# Patient Record
Sex: FEMALE | Race: WHITE | Employment: OTHER | URBAN - METROPOLITAN AREA
[De-identification: names, ages, dates, MRNs, and addresses within clinical notes are randomized per-mention and may not be internally consistent; named-entity substitution may affect disease eponyms.]

---

## 2017-01-06 ENCOUNTER — TELEPHONE (OUTPATIENT)
Dept: FAMILY MEDICINE CLINIC | Facility: CLINIC | Age: 82
End: 2017-01-06

## 2017-01-06 ENCOUNTER — TELEPHONE (OUTPATIENT)
Dept: CARDIOLOGY CLINIC | Facility: CLINIC | Age: 82
End: 2017-01-06

## 2017-01-06 DIAGNOSIS — M17.12 OSTEOARTHRITIS OF LEFT KNEE, UNSPECIFIED OSTEOARTHRITIS TYPE: Primary | ICD-10-CM

## 2017-01-06 RX ORDER — FUROSEMIDE 20 MG/1
40 TABLET ORAL DAILY
Qty: 30 TABLET | Refills: 0 | Status: SHIPPED | OUTPATIENT
Start: 2017-01-06 | End: 2017-01-24

## 2017-01-06 NOTE — TELEPHONE ENCOUNTER
Patient states that she needs to get an order to PT on strengthens of the left knee,  Unable to straighten her knee. Physical therapy is not given.

## 2017-01-09 NOTE — TELEPHONE ENCOUNTER
[facility], please advise on order.

## 2017-01-10 ENCOUNTER — OFFICE VISIT (OUTPATIENT)
Dept: FAMILY MEDICINE CLINIC | Facility: CLINIC | Age: 82
End: 2017-01-10

## 2017-01-10 VITALS — DIASTOLIC BLOOD PRESSURE: 79 MMHG | TEMPERATURE: 98 F | HEART RATE: 93 BPM | SYSTOLIC BLOOD PRESSURE: 129 MMHG

## 2017-01-10 DIAGNOSIS — I48.0 PAROXYSMAL ATRIAL FIBRILLATION (HCC): ICD-10-CM

## 2017-01-10 DIAGNOSIS — J44.1 COPD EXACERBATION (HCC): ICD-10-CM

## 2017-01-10 DIAGNOSIS — I42.9 CARDIOMYOPATHY, UNSPECIFIED TYPE (HCC): ICD-10-CM

## 2017-01-10 DIAGNOSIS — M17.12 OSTEOARTHRITIS OF LEFT KNEE, UNSPECIFIED OSTEOARTHRITIS TYPE: ICD-10-CM

## 2017-01-10 DIAGNOSIS — M77.11 LATERAL EPICONDYLITIS OF RIGHT ELBOW: Primary | ICD-10-CM

## 2017-01-10 PROCEDURE — G0463 HOSPITAL OUTPT CLINIC VISIT: HCPCS | Performed by: FAMILY MEDICINE

## 2017-01-10 PROCEDURE — 99214 OFFICE O/P EST MOD 30 MIN: CPT | Performed by: FAMILY MEDICINE

## 2017-01-10 RX ORDER — PREDNISONE 20 MG/1
20 TABLET ORAL 2 TIMES DAILY
Qty: 10 TABLET | Refills: 0 | Status: SHIPPED | OUTPATIENT
Start: 2017-01-10 | End: 2017-01-15

## 2017-01-10 NOTE — TELEPHONE ENCOUNTER
Diagnosis severe osteoarthritis of the left knee.   Physical therapy left knee 2 times a week for 4 weeks eval and treat

## 2017-01-10 NOTE — PROGRESS NOTES
Pt with swelling of her knee. Shattered knee during fall. Has some swelling of leg   Has been without her water pill for a few days. Will start home therapy, but may want to change to Piero Hall who is a physical therapist.    Pain in rt elbow.   Had

## 2017-01-12 ENCOUNTER — TELEPHONE (OUTPATIENT)
Dept: FAMILY MEDICINE CLINIC | Facility: CLINIC | Age: 82
End: 2017-01-12

## 2017-01-12 NOTE — TELEPHONE ENCOUNTER
Pt stts she has the information on physical therapist where her insurance will approve, Pt stts she feels more comfortable giving to the nurse when she calls back.  Please advise

## 2017-01-13 NOTE — TELEPHONE ENCOUNTER
Patient wants to provide an update that we can please disregard her request below. She will be seeing Dr Anthony Geller for this item below.

## 2017-01-19 ENCOUNTER — APPOINTMENT (OUTPATIENT)
Dept: LAB | Age: 82
End: 2017-01-19
Attending: FAMILY MEDICINE
Payer: MEDICARE

## 2017-01-19 PROCEDURE — 36415 COLL VENOUS BLD VENIPUNCTURE: CPT | Performed by: FAMILY MEDICINE

## 2017-01-19 PROCEDURE — 85025 COMPLETE CBC W/AUTO DIFF WBC: CPT | Performed by: FAMILY MEDICINE

## 2017-01-19 PROCEDURE — 80061 LIPID PANEL: CPT | Performed by: FAMILY MEDICINE

## 2017-01-19 PROCEDURE — 80053 COMPREHEN METABOLIC PANEL: CPT | Performed by: FAMILY MEDICINE

## 2017-01-19 RX ORDER — SPIRONOLACTONE 25 MG/1
TABLET ORAL
Qty: 90 TABLET | Refills: 0 | Status: SHIPPED | OUTPATIENT
Start: 2017-01-19 | End: 2017-04-17

## 2017-01-19 NOTE — TELEPHONE ENCOUNTER
Hypertensive Medications  Protocol Criteria:  · Appointment scheduled in the past 6 months or in the next 3 months  · BMP or CMP in the past 12 months  · Creatinine result < 2  Recent Visits       Provider Department Primary Dx    1 week ago Obi Boyer

## 2017-01-22 RX ORDER — ALBUTEROL SULFATE 2.5 MG/3ML
SOLUTION RESPIRATORY (INHALATION)
Qty: 180 ML | Refills: 0 | Status: SHIPPED | OUTPATIENT
Start: 2017-01-22 | End: 2017-03-19

## 2017-01-24 ENCOUNTER — OFFICE VISIT (OUTPATIENT)
Dept: CARDIOLOGY CLINIC | Facility: CLINIC | Age: 82
End: 2017-01-24

## 2017-01-24 VITALS
SYSTOLIC BLOOD PRESSURE: 114 MMHG | WEIGHT: 147 LBS | HEIGHT: 62 IN | BODY MASS INDEX: 27.05 KG/M2 | HEART RATE: 91 BPM | DIASTOLIC BLOOD PRESSURE: 80 MMHG

## 2017-01-24 DIAGNOSIS — R60.9 EDEMA, UNSPECIFIED TYPE: ICD-10-CM

## 2017-01-24 DIAGNOSIS — I48.0 PAROXYSMAL ATRIAL FIBRILLATION (HCC): ICD-10-CM

## 2017-01-24 DIAGNOSIS — I42.9 CARDIOMYOPATHY, UNSPECIFIED TYPE (HCC): Primary | ICD-10-CM

## 2017-01-24 PROCEDURE — 93005 ELECTROCARDIOGRAM TRACING: CPT | Performed by: INTERNAL MEDICINE

## 2017-01-24 PROCEDURE — G0463 HOSPITAL OUTPT CLINIC VISIT: HCPCS | Performed by: INTERNAL MEDICINE

## 2017-01-24 PROCEDURE — 99214 OFFICE O/P EST MOD 30 MIN: CPT | Performed by: INTERNAL MEDICINE

## 2017-01-24 PROCEDURE — 93010 ELECTROCARDIOGRAM REPORT: CPT | Performed by: INTERNAL MEDICINE

## 2017-01-24 RX ORDER — PRAVASTATIN SODIUM 20 MG
20 TABLET ORAL NIGHTLY
Qty: 30 TABLET | Refills: 6 | Status: SHIPPED | OUTPATIENT
Start: 2017-01-24 | End: 2017-08-28

## 2017-01-24 RX ORDER — FUROSEMIDE 20 MG/1
40 TABLET ORAL DAILY
Qty: 60 TABLET | Refills: 6 | Status: SHIPPED | OUTPATIENT
Start: 2017-01-24 | End: 2017-11-06

## 2017-01-24 NOTE — PROGRESS NOTES
Tigist Osullivan is a [de-identified]year old female. Patient presents with: Follow - Up: Yearly follow up    HPI:   This is a pleasant 27-year-old with hyperlipidemia prior A. fib asthma and cardiomyopathy who presents for follow-up.   We have not seen her in some amanda daily Disp:  Rfl:    Vitamin D3 (VITAMIN D3) 2000 UNITS Oral Cap Take  by mouth.  take 1 Capsule by Oral route  every day Disp:  Rfl:    POTASSIUM CHLORIDE ER 10 MEQ Oral Tab CR TAKE 1 TABLET BY MOUTH 4 TIMES A WEEK Disp: 48 tablet Rfl: 0   hydrocodone-acet Cardiomyopathy ([address] - Primary    Relevant Orders    CARD ECHO 2D DOPPLER (CPT=93306)    Atrial fibrillation (HCC)    Relevant Orders    CARD ECHO 2D DOPPLER (CPT=93306)    Edema    Relevant Orders    CARD ECHO 2D DOPPLER (CPT=93306)           In conclu

## 2017-01-26 RX ORDER — QUETIAPINE 100 MG/1
TABLET, FILM COATED ORAL
Qty: 90 TABLET | Refills: 11 | Status: SHIPPED | OUTPATIENT
Start: 2017-01-26 | End: 2019-08-28 | Stop reason: ALTCHOICE

## 2017-02-06 ENCOUNTER — HOSPITAL ENCOUNTER (OUTPATIENT)
Dept: CARDIOLOGY CLINIC | Age: 82
Discharge: HOME | End: 2017-02-06
Attending: INTERNAL MEDICINE
Payer: MEDICARE

## 2017-02-06 DIAGNOSIS — I42.9 CARDIOMYOPATHY, UNSPECIFIED TYPE (HCC): ICD-10-CM

## 2017-02-06 DIAGNOSIS — R60.9 EDEMA, UNSPECIFIED TYPE: ICD-10-CM

## 2017-02-06 DIAGNOSIS — I48.0 PAROXYSMAL ATRIAL FIBRILLATION (HCC): ICD-10-CM

## 2017-02-06 PROCEDURE — 93306 TTE W/DOPPLER COMPLETE: CPT

## 2017-02-06 PROCEDURE — 93306 TTE W/DOPPLER COMPLETE: CPT | Performed by: INTERNAL MEDICINE

## 2017-03-21 RX ORDER — ALBUTEROL SULFATE 2.5 MG/3ML
SOLUTION RESPIRATORY (INHALATION)
Qty: 180 ML | Refills: 0 | Status: SHIPPED | OUTPATIENT
Start: 2017-03-21 | End: 2017-07-27

## 2017-04-18 RX ORDER — SPIRONOLACTONE 25 MG/1
TABLET ORAL
Qty: 90 TABLET | Refills: 0 | Status: SHIPPED | OUTPATIENT
Start: 2017-04-18 | End: 2017-07-20

## 2017-04-24 RX ORDER — ALBUTEROL SULFATE 2.5 MG/3ML
SOLUTION RESPIRATORY (INHALATION)
Qty: 180 ML | Refills: 0 | OUTPATIENT
Start: 2017-04-24

## 2017-04-26 ENCOUNTER — TELEPHONE (OUTPATIENT)
Dept: PULMONOLOGY | Facility: CLINIC | Age: 82
End: 2017-04-26

## 2017-04-26 RX ORDER — ALBUTEROL SULFATE 2.5 MG/3ML
2.5 SOLUTION RESPIRATORY (INHALATION) 4 TIMES DAILY
Qty: 180 ML | Refills: 0 | Status: SHIPPED | OUTPATIENT
Start: 2017-04-26 | End: 2017-06-22

## 2017-04-26 NOTE — TELEPHONE ENCOUNTER
Also see TE from 4/24/2017 - Re: Albuterol Neb Solution - pt aware she needs to come in to see [facility] however she does not have any transportation at the moment. Pls call to discuss. Thank you.

## 2017-04-27 RX ORDER — ALBUTEROL SULFATE 2.5 MG/3ML
SOLUTION RESPIRATORY (INHALATION)
Qty: 1125 ML | Refills: 0 | OUTPATIENT
Start: 2017-04-27

## 2017-05-01 RX ORDER — BUDESONIDE 1 MG/2ML
INHALANT ORAL
Qty: 60 ML | Refills: 11 | Status: SHIPPED | OUTPATIENT
Start: 2017-05-01 | End: 2018-10-26

## 2017-06-08 ENCOUNTER — OFFICE VISIT (OUTPATIENT)
Dept: FAMILY MEDICINE CLINIC | Facility: CLINIC | Age: 82
End: 2017-06-08

## 2017-06-08 VITALS
SYSTOLIC BLOOD PRESSURE: 120 MMHG | WEIGHT: 193 LBS | DIASTOLIC BLOOD PRESSURE: 77 MMHG | HEART RATE: 98 BPM | BODY MASS INDEX: 35 KG/M2

## 2017-06-08 DIAGNOSIS — I42.9 CARDIOMYOPATHY, UNSPECIFIED TYPE (HCC): ICD-10-CM

## 2017-06-08 DIAGNOSIS — J43.9 PULMONARY EMPHYSEMA, UNSPECIFIED EMPHYSEMA TYPE (HCC): ICD-10-CM

## 2017-06-08 DIAGNOSIS — E78.00 HIGH CHOLESTEROL: ICD-10-CM

## 2017-06-08 DIAGNOSIS — M77.11 LATERAL EPICONDYLITIS OF RIGHT ELBOW: Primary | ICD-10-CM

## 2017-06-08 PROCEDURE — G0463 HOSPITAL OUTPT CLINIC VISIT: HCPCS | Performed by: FAMILY MEDICINE

## 2017-06-08 PROCEDURE — 99214 OFFICE O/P EST MOD 30 MIN: CPT | Performed by: FAMILY MEDICINE

## 2017-06-08 RX ORDER — POTASSIUM CHLORIDE 750 MG/1
TABLET, EXTENDED RELEASE ORAL
Qty: 90 TABLET | Refills: 3 | Status: SHIPPED | OUTPATIENT
Start: 2017-06-08 | End: 2018-07-24

## 2017-06-08 NOTE — PROGRESS NOTES
Rt elbow pain  Severe. Has had problems with her right elbow for months. Probably more than 6 months.   She uses a walker that has brakes and she uses the brakes and it is hard to apply the brakes on the walker she also has a problem with front wheels of crowded palate was normal  Lungs  reduced breath sounds bilaterally  Abdomen was soft non tender non distended bowel sounds were present  Heart was regular rate and rhythm normal S1-S2 no S3 no S4 there were no murmurs appreciated  Pulses the dorsalis pedi

## 2017-06-15 RX ORDER — ALBUTEROL SULFATE 2.5 MG/3ML
SOLUTION RESPIRATORY (INHALATION)
Qty: 225 ML | Refills: 0 | OUTPATIENT
Start: 2017-06-15

## 2017-06-15 NOTE — TELEPHONE ENCOUNTER
Pt states she will not be following up with Dr. Laura Mina and would like the pharmacy sent request to her PCP. Remi Ojeda at [facility] notified of this and she will send request to Dr. Christine Lopez.

## 2017-06-21 ENCOUNTER — TELEPHONE (OUTPATIENT)
Dept: PULMONOLOGY | Facility: CLINIC | Age: 82
End: 2017-06-21

## 2017-06-21 NOTE — TELEPHONE ENCOUNTER
Pt called requesting RX: ALBUTEROL, she is anxious and indicates she is unable to see/or make appt with Dr. Mi Mckeon to get RX, pls call at: [phone], thank you.   Current outpatient prescriptions:   ••  ALBUTEROL SULFATE (2.5 MG/3ML) 0.083% Inhalation Neb

## 2017-06-21 NOTE — TELEPHONE ENCOUNTER
Please refer to TE 6/13/17. Patient is refusing to make appt with Dr. Lynn Maire, has not been seen since 2015, pt was instructed to call PCP to fill medication Dr. Fawad Laguerre.  Pt stts she was referred to see Dr. Kenan Anguiano, pt was offered next available appt 7/14 w

## 2017-06-22 ENCOUNTER — TELEPHONE (OUTPATIENT)
Dept: FAMILY MEDICINE CLINIC | Facility: CLINIC | Age: 82
End: 2017-06-22

## 2017-06-22 RX ORDER — ALBUTEROL SULFATE 2.5 MG/3ML
2.5 SOLUTION RESPIRATORY (INHALATION) 4 TIMES DAILY
Qty: 100 AMPULE | Refills: 11 | Status: SHIPPED | OUTPATIENT
Start: 2017-06-22 | End: 2017-10-21

## 2017-06-22 NOTE — TELEPHONE ENCOUNTER
Patient stated that can not get in to see Pulmonary until July 17th. Patient indicated that wheezing but currently using her pulmicort inhaler. Denies being in distress. Chronic cough, but that is nothing new, has COPD.  Patient requesting a refill on the a

## 2017-06-22 NOTE — TELEPHONE ENCOUNTER
[facility] [place]     Current Outpatient Prescriptions:  ALBUTEROL SULFATE (2.5 MG/3ML) 0.083% Inhalation Nebu Soln USE ONE VIAL VIA NEBULIZER FOUR TIMES DAILY AS DIRECTED Disp: 180 mL Rfl: 0

## 2017-06-22 NOTE — TELEPHONE ENCOUNTER
Patient reports needing refill on inhaler. She does not want to go to [facility] to see Dr Danielle Matos as she has concerns with being able to tolerate traveling. She is requesting inhaler for one month to hold her over until she gets in to see Dr Hood Sandoval.     Current Ou

## 2017-06-22 NOTE — TELEPHONE ENCOUNTER
Refill Protocol Appointment Criteria  · Appointment scheduled in the past 6 months or in the next 3 months  Recent Visits       Provider Department Primary Dx    2 weeks ago Aviva Burns, [address] [facility]

## 2017-07-06 ENCOUNTER — LAB ENCOUNTER (OUTPATIENT)
Dept: LAB | Age: 82
End: 2017-07-06
Attending: FAMILY MEDICINE
Payer: MEDICARE

## 2017-07-06 DIAGNOSIS — I42.9 CARDIOMYOPATHY, UNSPECIFIED TYPE (HCC): ICD-10-CM

## 2017-07-06 DIAGNOSIS — E78.00 HIGH CHOLESTEROL: ICD-10-CM

## 2017-07-06 LAB
ALBUMIN SERPL BCP-MCNC: 3.9 G/DL (ref 3.5–4.8)
ALBUMIN/GLOB SERPL: 1.5 {RATIO} (ref 1–2)
ALP SERPL-CCNC: 81 U/L (ref 32–100)
ALT SERPL-CCNC: 18 U/L (ref 14–54)
ANION GAP SERPL CALC-SCNC: 10 MMOL/L (ref 0–18)
AST SERPL-CCNC: 21 U/L (ref 15–41)
BASOPHILS # BLD: 0 K/UL (ref 0–0.2)
BASOPHILS NFR BLD: 1 %
BILIRUB SERPL-MCNC: 0.9 MG/DL (ref 0.3–1.2)
BUN SERPL-MCNC: 23 MG/DL (ref 8–20)
BUN/CREAT SERPL: 19.7 (ref 10–20)
CALCIUM SERPL-MCNC: 10.1 MG/DL (ref 8.5–10.5)
CHLORIDE SERPL-SCNC: 102 MMOL/L (ref 95–110)
CHOLEST SERPL-MCNC: 167 MG/DL (ref 110–200)
CO2 SERPL-SCNC: 25 MMOL/L (ref 22–32)
CREAT SERPL-MCNC: 1.17 MG/DL (ref 0.5–1.5)
EOSINOPHIL # BLD: 0.2 K/UL (ref 0–0.7)
EOSINOPHIL NFR BLD: 4 %
ERYTHROCYTE [DISTWIDTH] IN BLOOD BY AUTOMATED COUNT: 13.6 % (ref 11–15)
GLOBULIN PLAS-MCNC: 2.6 G/DL (ref 2.5–3.7)
GLUCOSE SERPL-MCNC: 100 MG/DL (ref 70–99)
HCT VFR BLD AUTO: 40 % (ref 35–48)
HDLC SERPL-MCNC: 72 MG/DL
HGB BLD-MCNC: 13.3 G/DL (ref 12–16)
LDLC SERPL CALC-MCNC: 76 MG/DL (ref 0–99)
LYMPHOCYTES # BLD: 0.8 K/UL (ref 1–4)
LYMPHOCYTES NFR BLD: 13 %
MCH RBC QN AUTO: 30.4 PG (ref 27–32)
MCHC RBC AUTO-ENTMCNC: 33.2 G/DL (ref 32–37)
MCV RBC AUTO: 91.3 FL (ref 80–100)
MONOCYTES # BLD: 0.3 K/UL (ref 0–1)
MONOCYTES NFR BLD: 6 %
NEUTROPHILS # BLD AUTO: 4.4 K/UL (ref 1.8–7.7)
NEUTROPHILS NFR BLD: 76 %
NONHDLC SERPL-MCNC: 95 MG/DL
OSMOLALITY UR CALC.SUM OF ELEC: 288 MOSM/KG (ref 275–295)
PLATELET # BLD AUTO: 206 K/UL (ref 140–400)
PMV BLD AUTO: 9.1 FL (ref 7.4–10.3)
POTASSIUM SERPL-SCNC: 4.2 MMOL/L (ref 3.3–5.1)
PROT SERPL-MCNC: 6.5 G/DL (ref 5.9–8.4)
RBC # BLD AUTO: 4.38 M/UL (ref 3.7–5.4)
SODIUM SERPL-SCNC: 137 MMOL/L (ref 136–144)
TRIGL SERPL-MCNC: 96 MG/DL (ref 1–149)
WBC # BLD AUTO: 5.8 K/UL (ref 4–11)

## 2017-07-06 PROCEDURE — 80061 LIPID PANEL: CPT

## 2017-07-06 PROCEDURE — 85025 COMPLETE CBC W/AUTO DIFF WBC: CPT

## 2017-07-06 PROCEDURE — 36415 COLL VENOUS BLD VENIPUNCTURE: CPT

## 2017-07-06 PROCEDURE — 80053 COMPREHEN METABOLIC PANEL: CPT

## 2017-07-10 DIAGNOSIS — I42.9 CARDIOMYOPATHY, UNSPECIFIED TYPE (HCC): Primary | ICD-10-CM

## 2017-07-10 NOTE — ADDENDUM NOTE
Encounter addended by: Jacqueline Palma on: 7/10/2017  8:16 AM<BR>    Actions taken: Letter status changed

## 2017-07-14 ENCOUNTER — HOSPITAL ENCOUNTER (OUTPATIENT)
Dept: GENERAL RADIOLOGY | Age: 82
Discharge: HOME | End: 2017-07-14
Attending: INTERNAL MEDICINE | Admitting: INTERNAL MEDICINE
Payer: MEDICARE

## 2017-07-14 ENCOUNTER — OFFICE VISIT (OUTPATIENT)
Dept: PULMONOLOGY | Facility: CLINIC | Age: 82
End: 2017-07-14

## 2017-07-14 VITALS
HEART RATE: 93 BPM | RESPIRATION RATE: 19 BRPM | SYSTOLIC BLOOD PRESSURE: 128 MMHG | OXYGEN SATURATION: 98 % | DIASTOLIC BLOOD PRESSURE: 79 MMHG

## 2017-07-14 DIAGNOSIS — R05.9 COUGH: ICD-10-CM

## 2017-07-14 DIAGNOSIS — R05.9 COUGH: Primary | ICD-10-CM

## 2017-07-14 DIAGNOSIS — G47.33 OSA ON CPAP: ICD-10-CM

## 2017-07-14 DIAGNOSIS — Z99.89 OSA ON CPAP: ICD-10-CM

## 2017-07-14 PROCEDURE — 99214 OFFICE O/P EST MOD 30 MIN: CPT | Performed by: INTERNAL MEDICINE

## 2017-07-14 PROCEDURE — 71020 XR CHEST PA + LAT CHEST (CPT=71020): CPT | Performed by: INTERNAL MEDICINE

## 2017-07-14 PROCEDURE — G0463 HOSPITAL OUTPT CLINIC VISIT: HCPCS | Performed by: INTERNAL MEDICINE

## 2017-07-14 RX ORDER — ALBUTEROL SULFATE 2.5 MG/3ML
2.5 SOLUTION RESPIRATORY (INHALATION) 4 TIMES DAILY
Qty: 120 VIAL | Refills: 5 | Status: SHIPPED | OUTPATIENT
Start: 2017-07-14 | End: 2018-10-26

## 2017-07-14 RX ORDER — CODEINE PHOSPHATE AND GUAIFENESIN 10; 100 MG/5ML; MG/5ML
5 SOLUTION ORAL 2 TIMES DAILY PRN
Qty: 180 ML | Refills: 0 | Status: SHIPPED | OUTPATIENT
Start: 2017-07-14 | End: 2017-07-27

## 2017-07-14 RX ORDER — BUDESONIDE 1 MG/2ML
1 INHALANT ORAL DAILY
Qty: 30 EACH | Refills: 5 | Status: SHIPPED | OUTPATIENT
Start: 2017-07-14 | End: 2017-10-21

## 2017-07-14 RX ORDER — AZITHROMYCIN 250 MG/1
TABLET, FILM COATED ORAL
Qty: 1 PACKAGE | Refills: 0 | Status: SHIPPED | OUTPATIENT
Start: 2017-07-14 | End: 2017-07-27

## 2017-07-14 NOTE — H&P
Referring Physician  Nasir Castro. Audie Castro DO    Chief Complaint  Establishing care with new pulmonologist    History of Present Illness  Patient is a 50-year-old  female who presents the pulmonary clinic.   She has been seen by my partner Dr. Veronica Bonner artery disease       Social History  Tobacco: 20-pack-year history of tobacco abuse. Quit 40 years ago.   Alcohol: Denies significant intake  Illicit Drugs: Denies    Medications    Current Outpatient Prescriptions on File Prior to Visit:  albuterol sulfat Antibiotics           Comment:Other reaction(s): SULFA (SULFONAMIDE ANTIBIOTICS)    Physical Exam  Constitutional: no acute distress, alert, oriented  HEENT: PERRL, EOMI, nares patents, no nasal polyps   Cardio: RRR, S1 S2  Respiratory: Faint rales  GI: ab

## 2017-07-17 ENCOUNTER — TELEPHONE (OUTPATIENT)
Dept: PULMONOLOGY | Facility: CLINIC | Age: 82
End: 2017-07-17

## 2017-07-17 NOTE — TELEPHONE ENCOUNTER
New order sent to [facility], which is contracted with [facility]. Pt notified. Will await contact.

## 2017-07-17 NOTE — TELEPHONE ENCOUNTER
Cally Elias states they cannot provide pt with CPap and supplies at this time as they are not contracted with pt's insurance. For add'l questions pls call. Thank you.

## 2017-07-21 RX ORDER — SPIRONOLACTONE 25 MG/1
TABLET ORAL
Qty: 90 TABLET | Refills: 11 | Status: SHIPPED | OUTPATIENT
Start: 2017-07-21 | End: 2018-08-16

## 2017-07-27 ENCOUNTER — OFFICE VISIT (OUTPATIENT)
Dept: CARDIOLOGY CLINIC | Facility: CLINIC | Age: 82
End: 2017-07-27

## 2017-07-27 VITALS
RESPIRATION RATE: 18 BRPM | DIASTOLIC BLOOD PRESSURE: 80 MMHG | SYSTOLIC BLOOD PRESSURE: 128 MMHG | HEART RATE: 72 BPM | BODY MASS INDEX: 25 KG/M2 | WEIGHT: 135 LBS

## 2017-07-27 DIAGNOSIS — I42.9 CARDIOMYOPATHY, UNSPECIFIED TYPE (HCC): Primary | ICD-10-CM

## 2017-07-27 DIAGNOSIS — R60.9 EDEMA, UNSPECIFIED TYPE: ICD-10-CM

## 2017-07-27 DIAGNOSIS — I48.0 PAROXYSMAL ATRIAL FIBRILLATION (HCC): ICD-10-CM

## 2017-07-27 PROCEDURE — G0463 HOSPITAL OUTPT CLINIC VISIT: HCPCS | Performed by: INTERNAL MEDICINE

## 2017-07-27 PROCEDURE — 99214 OFFICE O/P EST MOD 30 MIN: CPT | Performed by: INTERNAL MEDICINE

## 2017-07-27 NOTE — PATIENT INSTRUCTIONS
Continue same medicines  Okay from a cardiac standpoint for surgery.   If hospitalized overnight should be monitored on remote telemetry

## 2017-07-27 NOTE — PROGRESS NOTES
James Redman is a 81year old female. Patient presents with:  Surgical/procedure Clearance: 8/31/17  left knee replacement [facility] Dr Jessa Perez  Dyspnea: hx COPD, sleep apnea  Edema: lower extremity    HPI:   This is a pleasant 81year-old well-kn 500 MG Oral Tab Take 500 mg by mouth daily. Disp:  Rfl:    Multiple Vitamins-Minerals (CENTRUM SILVER) Oral Tab Take  by mouth. Take one tablet by mouth daily Disp:  Rfl:    Vitamin D3 (VITAMIN D3) 2000 UNITS Oral Cap Take  by mouth.  take 1 Capsule by Oral edema    Assessment   ASSESSMENT AND PLAN:     Problem List Items Addressed This Visit     Cardiomyopathy ([facility] - Primary    Atrial fibrillation ([facility]    Edema      Other Visit Diagnoses    None.          In conclusion this is a pleasant 81year-old with pr

## 2017-07-28 NOTE — TELEPHONE ENCOUNTER
Spoke to Chaz Chaudhari who sttd they never received order for cpap supplies.  Orders refaxed to [phone]

## 2017-08-01 ENCOUNTER — TELEPHONE (OUTPATIENT)
Dept: PULMONOLOGY | Facility: CLINIC | Age: 82
End: 2017-08-01

## 2017-08-01 ENCOUNTER — TELEPHONE (OUTPATIENT)
Dept: CARDIOLOGY CLINIC | Facility: CLINIC | Age: 82
End: 2017-08-01

## 2017-08-01 DIAGNOSIS — G47.33 OSA (OBSTRUCTIVE SLEEP APNEA): Primary | ICD-10-CM

## 2017-08-01 NOTE — TELEPHONE ENCOUNTER
James Espinosa requesting RN to fax Sleep Study and Rx for new Cpap and supplies to [phone] attn: James Espinosa. For add'l questions pls call. Thank you.

## 2017-08-01 NOTE — TELEPHONE ENCOUNTER
Provider from [facility] calling to requesting cardiac clearance from the last office visit notes. [facility] Fax [phone].  Any questions call: Sen Gómez DK:[phone]

## 2017-08-03 NOTE — TELEPHONE ENCOUNTER
Pt calling indicates she will be at the dentist after noon today (avail till 11:30) Pt getting upset and indicates she needs her cpap materials. Pls call at: [phone] (uofl).

## 2017-08-03 NOTE — TELEPHONE ENCOUNTER
Pt retd call. Please call pt on [phone] only. Pt is very anxious to speak to RN. Attempted to transfer/no answer. Please call.

## 2017-08-04 ENCOUNTER — TELEPHONE (OUTPATIENT)
Dept: SLEEP CENTER | Age: 82
End: 2017-08-04

## 2017-08-04 NOTE — TELEPHONE ENCOUNTER
Eduarda Us manager of sleep study indicates that pt is reaching out to her and she can not help her due to sleep study over 10ys, unless we have our own? Eduarda Us is requesting we call the pt directly at   [phone],[facility].

## 2017-08-04 NOTE — TELEPHONE ENCOUNTER
Notified pt of below. She stts serial # [id number]. Explained I will d/w [facility] & f/u. She verbalized understanding.     Dave Jaramillo @ [employer] stts they have been trying to p/u machine since April, it is their machine, the facility is being charged f

## 2017-08-04 NOTE — TELEPHONE ENCOUNTER
Pt called [facility] asking for direction with getting new equipment. Referred pt back to MD Jose F Alvarado office and spoke to KALEN MCARTHUR

## 2017-08-04 NOTE — TELEPHONE ENCOUNTER
Mira complains that Bouchra Donahue never called her back on her cell phone. Pt voices frustration (raising voice, crying). She stts she & her family will come to the clinic to file a complaint next week.  Explained Bouchra Donahue documented messages left, she is not

## 2017-08-04 NOTE — TELEPHONE ENCOUNTER
Informed pt of below. She stts [facility] took her personal machine and gave her the machine she has. Explained she will have to d/w [facility], be seen by Dr. Yudith Edmondson again for face-to-face office visit & have another sleep study, but first avail 8/25.  She st

## 2017-08-08 NOTE — TELEPHONE ENCOUNTER
You may have the patient come see me in the clinic sometime next week either Tuesday or Thursday it is okay to double book. I would prefer she sees me during the first half of the schedule during those 2 days. I have ordered sleep study at the sleep lab.

## 2017-08-08 NOTE — TELEPHONE ENCOUNTER
Attempted to inform pt of Dr. Selwyn Pete orders, but pt interrupted RN.  She stts we still are unable to contact her at the correct phone #, she is tired of waiting for Dr. Hood Sandoval, & she will pursue treatment through a Pulmonologist @ [facility] unless Dr. Hood Sandoval

## 2017-08-10 PROBLEM — G47.33 OBSTRUCTIVE SLEEP APNEA (ADULT) (PEDIATRIC): Status: ACTIVE | Noted: 2017-08-10

## 2017-08-10 PROBLEM — R53.83 FATIGUE, UNSPECIFIED TYPE: Status: ACTIVE | Noted: 2017-08-10

## 2017-08-28 ENCOUNTER — TELEPHONE (OUTPATIENT)
Dept: CARDIOLOGY CLINIC | Facility: CLINIC | Age: 82
End: 2017-08-28

## 2017-08-28 RX ORDER — PRAVASTATIN SODIUM 20 MG
20 TABLET ORAL NIGHTLY
Qty: 30 TABLET | Refills: 6 | Status: SHIPPED | OUTPATIENT
Start: 2017-08-28 | End: 2018-01-23

## 2017-08-28 NOTE — TELEPHONE ENCOUNTER
Pravastatin 20mg tabs, take 1 tab by mouth nightly, qty 30    Current Outpatient Prescriptions:   •  Pravastatin Sodium 20 MG Oral Tab, Take 1 tablet (20 mg total) by mouth nightly., Disp: 30 tablet, Rfl: 6

## 2017-10-02 ENCOUNTER — TELEPHONE (OUTPATIENT)
Dept: FAMILY MEDICINE CLINIC | Facility: CLINIC | Age: 82
End: 2017-10-02

## 2017-10-02 NOTE — TELEPHONE ENCOUNTER
Christelle Bell from [facility] called in as an FYI that there was a delay in start of care until tomorrow. Christelle Bell also states that she will fax over an order to the MD to the [place] office.

## 2017-10-04 NOTE — TELEPHONE ENCOUNTER
Satya Bower from [facility] stts pt started care today, will fax plan over care. Need order to check pts o2 at every visit.      CB# [phone]

## 2017-10-06 NOTE — TELEPHONE ENCOUNTER
OT was unable to schedule for this week, rescheduling for next week per Jose Watson, PT supervisor for [facility]. She will refax orders to Dr. Christian Barr to have signed.

## 2017-10-06 NOTE — TELEPHONE ENCOUNTER
Received call from Satya Bower, [address] [place] [address]. She was verifying the pt's chronic illnesses. She has no further questions or concerns at this time.

## 2017-10-07 ENCOUNTER — TELEPHONE (OUTPATIENT)
Dept: FAMILY MEDICINE CLINIC | Facility: CLINIC | Age: 82
End: 2017-10-07

## 2017-10-07 NOTE — TELEPHONE ENCOUNTER
Home health orders received in [facility]. Placed in [facility] for review and signature.

## 2017-10-09 NOTE — TELEPHONE ENCOUNTER
[facility] see message below [place] orders.

## 2017-10-12 ENCOUNTER — MED REC SCAN ONLY (OUTPATIENT)
Dept: FAMILY MEDICINE CLINIC | Facility: CLINIC | Age: 82
End: 2017-10-12

## 2017-10-13 NOTE — TELEPHONE ENCOUNTER
Evonne Jones is calling from [facility]  state that they will be discharging pt from OT on 10/15 state pt will start out pt OT on 10/22  Requesting a call back to [phone]

## 2017-10-13 NOTE — TELEPHONE ENCOUNTER
Justin Hernandez was inform of Dr.CMc message below and inform her ok to discharge pt from OT the week of 10/15.  Pt will start OUT Pt PT the week of 10/22 but this was order by another THanks

## 2017-10-16 ENCOUNTER — TELEPHONE (OUTPATIENT)
Dept: FAMILY MEDICINE CLINIC | Facility: CLINIC | Age: 82
End: 2017-10-16

## 2017-10-16 NOTE — TELEPHONE ENCOUNTER
[facility], this is to inform you that patient is being discharged [facility] tomorrow and [facility] Thursday 10/19/17 and she will begin outpatient therapy next week

## 2017-10-21 ENCOUNTER — OFFICE VISIT (OUTPATIENT)
Dept: FAMILY MEDICINE CLINIC | Facility: CLINIC | Age: 82
End: 2017-10-21

## 2017-10-21 VITALS
DIASTOLIC BLOOD PRESSURE: 71 MMHG | SYSTOLIC BLOOD PRESSURE: 138 MMHG | BODY MASS INDEX: 28 KG/M2 | HEART RATE: 66 BPM | WEIGHT: 140 LBS

## 2017-10-21 DIAGNOSIS — S72.8X2S: Primary | ICD-10-CM

## 2017-10-21 DIAGNOSIS — M77.11 LATERAL EPICONDYLITIS OF RIGHT ELBOW: ICD-10-CM

## 2017-10-21 DIAGNOSIS — I48.0 PAROXYSMAL ATRIAL FIBRILLATION (HCC): ICD-10-CM

## 2017-10-21 DIAGNOSIS — J44.1 COPD EXACERBATION (HCC): ICD-10-CM

## 2017-10-21 DIAGNOSIS — M81.0 MENOPAUSAL OSTEOPOROSIS: ICD-10-CM

## 2017-10-21 DIAGNOSIS — I42.9 CARDIOMYOPATHY, UNSPECIFIED TYPE (HCC): ICD-10-CM

## 2017-10-21 DIAGNOSIS — J43.9 PULMONARY EMPHYSEMA, UNSPECIFIED EMPHYSEMA TYPE (HCC): ICD-10-CM

## 2017-10-21 PROCEDURE — 99215 OFFICE O/P EST HI 40 MIN: CPT | Performed by: FAMILY MEDICINE

## 2017-10-21 PROCEDURE — G0463 HOSPITAL OUTPT CLINIC VISIT: HCPCS | Performed by: FAMILY MEDICINE

## 2017-10-21 RX ORDER — CHOLECALCIFEROL (VITAMIN D3) 125 MCG
2 CAPSULE ORAL NIGHTLY
Status: ON HOLD | COMMUNITY
End: 2021-01-01

## 2017-10-21 RX ORDER — DOCUSATE SODIUM 100 MG/1
100 CAPSULE, LIQUID FILLED ORAL 2 TIMES DAILY
COMMUNITY
End: 2019-03-07 | Stop reason: ALTCHOICE

## 2017-10-21 NOTE — PROGRESS NOTES
8/31 had malunion fracture left femur orif Dr. Delma Capellan at [address] [place] [address] for [facility]    Now living alone in independent living. Not driving yet    Doing her therapy going to start out patient.         Patient's past medical surgical family social history wa femur, unspecified portion of femur, sequela  Doing well    2. Pulmonary emphysema, unspecified emphysema type ([address]  Stable. 3. Cardiomyopathy, unspecified type ([address]  stable  rehcekc ekg and f/u in Jan.  4. COPD exacerbation ([address]  Stable.   Doing well

## 2017-10-30 RX ORDER — OMEPRAZOLE 40 MG/1
CAPSULE, DELAYED RELEASE ORAL
Qty: 90 CAPSULE | Refills: 0 | Status: SHIPPED | OUTPATIENT
Start: 2017-10-30 | End: 2018-01-29

## 2017-10-30 NOTE — TELEPHONE ENCOUNTER
Refilled per protocol.     Refill Protocol Appointment Criteria  · Appointment scheduled in the past 12 months or in the next 3 months  Recent Outpatient Visits            1 week ago Other type I or II open fracture of left femur, unspecified portion of fem

## 2017-11-06 ENCOUNTER — TELEPHONE (OUTPATIENT)
Dept: CARDIOLOGY CLINIC | Facility: CLINIC | Age: 82
End: 2017-11-06

## 2017-11-06 RX ORDER — FUROSEMIDE 20 MG/1
40 TABLET ORAL DAILY
Qty: 60 TABLET | Refills: 5 | Status: SHIPPED | OUTPATIENT
Start: 2017-11-06 | End: 2018-08-23

## 2017-11-06 NOTE — TELEPHONE ENCOUNTER
Furosemide 20mg tabs (Lasix 20mg tabs), take 2 tabs by mouth daily, qty 60    Current Outpatient Prescriptions:   •  furosemide 20 MG Oral Tab, Take 2 tablets (40 mg total) by mouth daily. , Disp: 60 tablet, Rfl: 6

## 2017-11-29 ENCOUNTER — TELEPHONE (OUTPATIENT)
Dept: PULMONOLOGY | Facility: CLINIC | Age: 82
End: 2017-11-29

## 2017-11-29 NOTE — TELEPHONE ENCOUNTER
Ovi requesting prior auth to be completed today as Medicare will need info prior end of business day. For add'l questions pls call. Thank you.

## 2017-11-29 NOTE — TELEPHONE ENCOUNTER
Francia Patel Medicare is requiring CMN for budesonide neb soln. She stts CMN was faxed, but will be refaxed. Confirmed fax #. Suszanne Crigler stts pt out of med. Explained Dr. Severo Delgadillo will be avail tomorrow & will address then asap. She verbalized understanding.

## 2017-12-01 NOTE — TELEPHONE ENCOUNTER
JAMES faxed to [facility] at #[phone]. Confirmation rcvd.

## 2017-12-20 ENCOUNTER — NURSE TRIAGE (OUTPATIENT)
Dept: OTHER | Age: 82
End: 2017-12-20

## 2017-12-20 NOTE — TELEPHONE ENCOUNTER
Action Requested: Summary for Provider     []  Critical Lab, Recommendations Needed  [x] Need Additional Advice  []   FYI    []   Need Orders  [] Need Medications Sent to Pharmacy  []  Other     SUMMARY: pt asking for something for a runny nose, states she

## 2018-01-10 ENCOUNTER — HOSPITAL ENCOUNTER (OUTPATIENT)
Dept: BONE DENSITY | Facility: HOSPITAL | Age: 83
Discharge: HOME | End: 2018-01-10
Attending: FAMILY MEDICINE
Payer: MEDICARE

## 2018-01-10 ENCOUNTER — APPOINTMENT (OUTPATIENT)
Dept: LAB | Facility: HOSPITAL | Age: 83
End: 2018-01-10
Attending: FAMILY MEDICINE
Payer: MEDICARE

## 2018-01-10 ENCOUNTER — HOSPITAL ENCOUNTER (OUTPATIENT)
Dept: GENERAL RADIOLOGY | Facility: HOSPITAL | Age: 83
Discharge: HOME | End: 2018-01-10
Attending: FAMILY MEDICINE
Payer: MEDICARE

## 2018-01-10 DIAGNOSIS — I42.9 CARDIOMYOPATHY, UNSPECIFIED TYPE (HCC): ICD-10-CM

## 2018-01-10 DIAGNOSIS — M81.0 MENOPAUSAL OSTEOPOROSIS: ICD-10-CM

## 2018-01-10 DIAGNOSIS — I48.0 PAROXYSMAL ATRIAL FIBRILLATION (HCC): Primary | ICD-10-CM

## 2018-01-10 DIAGNOSIS — J43.9 PULMONARY EMPHYSEMA, UNSPECIFIED EMPHYSEMA TYPE (HCC): ICD-10-CM

## 2018-01-10 DIAGNOSIS — I48.0 PAROXYSMAL ATRIAL FIBRILLATION (HCC): ICD-10-CM

## 2018-01-10 LAB
ALBUMIN SERPL BCP-MCNC: 3.7 G/DL (ref 3.5–4.8)
ALBUMIN/GLOB SERPL: 1.4 {RATIO} (ref 1–2)
ALP SERPL-CCNC: 103 U/L (ref 32–100)
ALT SERPL-CCNC: 17 U/L (ref 14–54)
ANION GAP SERPL CALC-SCNC: 7 MMOL/L (ref 0–18)
AST SERPL-CCNC: 21 U/L (ref 15–41)
BASOPHILS # BLD: 0 K/UL (ref 0–0.2)
BASOPHILS NFR BLD: 1 %
BILIRUB SERPL-MCNC: 0.5 MG/DL (ref 0.3–1.2)
BUN SERPL-MCNC: 29 MG/DL (ref 8–20)
BUN/CREAT SERPL: 26.4 (ref 10–20)
CALCIUM SERPL-MCNC: 9.1 MG/DL (ref 8.5–10.5)
CHLORIDE SERPL-SCNC: 105 MMOL/L (ref 95–110)
CO2 SERPL-SCNC: 28 MMOL/L (ref 22–32)
CREAT SERPL-MCNC: 1.1 MG/DL (ref 0.5–1.5)
EOSINOPHIL # BLD: 0.3 K/UL (ref 0–0.7)
EOSINOPHIL NFR BLD: 5 %
ERYTHROCYTE [DISTWIDTH] IN BLOOD BY AUTOMATED COUNT: 16.3 % (ref 11–15)
GLOBULIN PLAS-MCNC: 2.7 G/DL (ref 2.5–3.7)
GLUCOSE SERPL-MCNC: 107 MG/DL (ref 70–99)
HCT VFR BLD AUTO: 36.2 % (ref 35–48)
HGB BLD-MCNC: 11.8 G/DL (ref 12–16)
LYMPHOCYTES # BLD: 0.8 K/UL (ref 1–4)
LYMPHOCYTES NFR BLD: 15 %
MCH RBC QN AUTO: 27.5 PG (ref 27–32)
MCHC RBC AUTO-ENTMCNC: 32.4 G/DL (ref 32–37)
MCV RBC AUTO: 84.9 FL (ref 80–100)
MONOCYTES # BLD: 0.4 K/UL (ref 0–1)
MONOCYTES NFR BLD: 9 %
NEUTROPHILS # BLD AUTO: 3.6 K/UL (ref 1.8–7.7)
NEUTROPHILS NFR BLD: 70 %
OSMOLALITY UR CALC.SUM OF ELEC: 296 MOSM/KG (ref 275–295)
PLATELET # BLD AUTO: 224 K/UL (ref 140–400)
PMV BLD AUTO: 8.3 FL (ref 7.4–10.3)
POTASSIUM SERPL-SCNC: 3.8 MMOL/L (ref 3.3–5.1)
PROT SERPL-MCNC: 6.4 G/DL (ref 5.9–8.4)
RBC # BLD AUTO: 4.27 M/UL (ref 3.7–5.4)
SODIUM SERPL-SCNC: 140 MMOL/L (ref 136–144)
WBC # BLD AUTO: 5.1 K/UL (ref 4–11)

## 2018-01-10 PROCEDURE — 93005 ELECTROCARDIOGRAM TRACING: CPT

## 2018-01-10 PROCEDURE — 85025 COMPLETE CBC W/AUTO DIFF WBC: CPT

## 2018-01-10 PROCEDURE — 93010 ELECTROCARDIOGRAM REPORT: CPT | Performed by: FAMILY MEDICINE

## 2018-01-10 PROCEDURE — 77080 DXA BONE DENSITY AXIAL: CPT | Performed by: FAMILY MEDICINE

## 2018-01-10 PROCEDURE — 71046 X-RAY EXAM CHEST 2 VIEWS: CPT | Performed by: FAMILY MEDICINE

## 2018-01-10 PROCEDURE — 82306 VITAMIN D 25 HYDROXY: CPT

## 2018-01-10 PROCEDURE — 80053 COMPREHEN METABOLIC PANEL: CPT

## 2018-01-10 PROCEDURE — 36415 COLL VENOUS BLD VENIPUNCTURE: CPT

## 2018-01-12 LAB — 25(OH)D3 SERPL-MCNC: 54.4 NG/ML

## 2018-01-13 ENCOUNTER — TELEPHONE (OUTPATIENT)
Dept: OTHER | Age: 83
End: 2018-01-13

## 2018-01-13 DIAGNOSIS — R93.89 ABNORMAL CHEST X-RAY: Primary | ICD-10-CM

## 2018-01-13 NOTE — TELEPHONE ENCOUNTER
----- Message from Bebe Stephens DO sent at 1/12/2018  4:48 PM CST -----  There are changes within the right lower lobe of the lung. This could be due to her COPD versus something else.   Have her get CT chest no contrast diagnosis abnormal chest x-ra

## 2018-01-13 NOTE — TELEPHONE ENCOUNTER
Pt was inform of  message below and she verbalized understanding. She has a appt to see Iraj Mcclure on 1/26.

## 2018-01-13 NOTE — TELEPHONE ENCOUNTER
----- Message from Walter Qureshi DO sent at 1/12/2018  4:46 PM CST -----  Blood sugars slightly elevated. liver test was normal.  Kidney test has been stable/improving from previous studies.   CBC showed slight anemia otherwise normal .  Continue on t

## 2018-01-13 NOTE — TELEPHONE ENCOUNTER
----- Message from Chelsie Gramajo DO sent at 1/12/2018  4:49 PM CST -----  Worsening of the osteoporosis. Follow-up after her CT scan of the chest to discuss.

## 2018-01-19 ENCOUNTER — HOSPITAL ENCOUNTER (OUTPATIENT)
Dept: CT IMAGING | Facility: HOSPITAL | Age: 83
Discharge: HOME | End: 2018-01-19
Attending: FAMILY MEDICINE
Payer: MEDICARE

## 2018-01-19 DIAGNOSIS — R93.89 ABNORMAL CHEST X-RAY: ICD-10-CM

## 2018-01-19 PROCEDURE — 71250 CT THORAX DX C-: CPT | Performed by: FAMILY MEDICINE

## 2018-01-23 ENCOUNTER — OFFICE VISIT (OUTPATIENT)
Dept: CARDIOLOGY CLINIC | Facility: CLINIC | Age: 83
End: 2018-01-23

## 2018-01-23 VITALS
RESPIRATION RATE: 16 BRPM | BODY MASS INDEX: 27 KG/M2 | SYSTOLIC BLOOD PRESSURE: 112 MMHG | DIASTOLIC BLOOD PRESSURE: 80 MMHG | HEART RATE: 64 BPM | WEIGHT: 140 LBS

## 2018-01-23 DIAGNOSIS — I48.0 PAROXYSMAL ATRIAL FIBRILLATION (HCC): ICD-10-CM

## 2018-01-23 DIAGNOSIS — R60.9 EDEMA, UNSPECIFIED TYPE: ICD-10-CM

## 2018-01-23 DIAGNOSIS — I42.9 CARDIOMYOPATHY, UNSPECIFIED TYPE (HCC): Primary | ICD-10-CM

## 2018-01-23 PROCEDURE — G0463 HOSPITAL OUTPT CLINIC VISIT: HCPCS | Performed by: INTERNAL MEDICINE

## 2018-01-23 PROCEDURE — 99214 OFFICE O/P EST MOD 30 MIN: CPT | Performed by: INTERNAL MEDICINE

## 2018-01-23 RX ORDER — PRAVASTATIN SODIUM 20 MG
20 TABLET ORAL NIGHTLY
Qty: 30 TABLET | Refills: 6 | Status: SHIPPED | OUTPATIENT
Start: 2018-01-23 | End: 2018-11-02

## 2018-01-23 NOTE — PROGRESS NOTES
Oneida Inman is a 81year old female. Patient presents with: Follow - Up    HPI:   This is a pleasant 81year-old with history of prior A. fib asthma cardiomyopathy and elevated cholesterol who presents for follow-up.   Since seen she has had knee surge Vitamins-Minerals (CENTRUM SILVER) Oral Tab Take  by mouth. Take one tablet by mouth daily Disp:  Rfl:    Vitamin D3 (VITAMIN D3) 2000 UNITS Oral Cap Take  by mouth.  take 1 Capsule by Oral route  every day Disp:  Rfl:       Past Medical History:   Amber Geiger Visit     Cardiomyopathy [facility]) - Primary    Atrial fibrillation ([address]    Edema          In conclusion this is a pleasant 81year-old with prior atrial fibrillation who is maintaining sinus rhythm and known cardiomyopathy who his LV is improved and is doing

## 2018-01-26 ENCOUNTER — OFFICE VISIT (OUTPATIENT)
Dept: FAMILY MEDICINE CLINIC | Facility: CLINIC | Age: 83
End: 2018-01-26

## 2018-01-26 VITALS
TEMPERATURE: 98 F | DIASTOLIC BLOOD PRESSURE: 75 MMHG | HEART RATE: 69 BPM | WEIGHT: 146 LBS | BODY MASS INDEX: 29 KG/M2 | SYSTOLIC BLOOD PRESSURE: 126 MMHG

## 2018-01-26 DIAGNOSIS — E25.8: ICD-10-CM

## 2018-01-26 DIAGNOSIS — M77.11 LATERAL EPICONDYLITIS OF RIGHT ELBOW: ICD-10-CM

## 2018-01-26 DIAGNOSIS — D35.00 ADRENAL CORTICAL ADENOMA, UNSPECIFIED LATERALITY: ICD-10-CM

## 2018-01-26 DIAGNOSIS — E78.00 HIGH CHOLESTEROL: ICD-10-CM

## 2018-01-26 DIAGNOSIS — I42.9 CARDIOMYOPATHY, UNSPECIFIED TYPE (HCC): ICD-10-CM

## 2018-01-26 DIAGNOSIS — N18.30 STAGE 3 CHRONIC KIDNEY DISEASE (HCC): ICD-10-CM

## 2018-01-26 DIAGNOSIS — J43.9 PULMONARY EMPHYSEMA, UNSPECIFIED EMPHYSEMA TYPE (HCC): Primary | ICD-10-CM

## 2018-01-26 PROCEDURE — 99214 OFFICE O/P EST MOD 30 MIN: CPT | Performed by: FAMILY MEDICINE

## 2018-01-26 PROCEDURE — G0463 HOSPITAL OUTPT CLINIC VISIT: HCPCS | Performed by: FAMILY MEDICINE

## 2018-01-26 RX ORDER — SERTRALINE HYDROCHLORIDE 100 MG/1
TABLET, FILM COATED ORAL
Refills: 0 | COMMUNITY
Start: 2018-01-22 | End: 2019-08-28 | Stop reason: ALTCHOICE

## 2018-01-26 NOTE — PROGRESS NOTES
Pt with cough  Phlegm production  Has adrenal tumors seen on ct        Patient's past medical surgical family social history was reviewed.      Review of Systems  Allergic: no environmental allergies or food allergies  Cardiovascular: no chest pain, irregul laterality  U/s and labs. - CORTISOL; Future  - ASSAY, THYROID STIM HORMONE; Future  - ALDOSTERONE, SERUM; Future  - US KIDNEYS (EYH=52995); Future    6. Other adrenogenital disorders ([address]   Us and labs. - ASSAY, THYROID STIM HORMONE; Future    7.  Stage

## 2018-01-29 RX ORDER — OMEPRAZOLE 40 MG/1
CAPSULE, DELAYED RELEASE ORAL
Qty: 90 CAPSULE | Refills: 0 | Status: SHIPPED | OUTPATIENT
Start: 2018-01-29 | End: 2018-04-27

## 2018-01-29 NOTE — TELEPHONE ENCOUNTER
Refilled per written protocol.     Refill Protocol Appointment Criteria Gastrointestional Medication:    · Appointment scheduled in the past 12 months or in the next 3 months  Recent Outpatient Visits            3 days ago Pulmonary emphysema, unspecified e

## 2018-01-29 NOTE — TELEPHONE ENCOUNTER
Pharmacy called up to refill Rx Omeprazole 40 MG .please advise         Current Outpatient Prescriptions:  MEPRAZOLE 40 MG Oral Capsule Delayed Release TAKE ONE CAPSULE BY MOUTH EVERY NIGHT AT BEDTIME Disp: 90 capsule Rfl: 0

## 2018-01-30 ENCOUNTER — TELEPHONE (OUTPATIENT)
Dept: FAMILY MEDICINE CLINIC | Facility: CLINIC | Age: 83
End: 2018-01-30

## 2018-01-30 DIAGNOSIS — D35.00 ADRENAL CORTICAL ADENOMA, UNSPECIFIED LATERALITY: Primary | ICD-10-CM

## 2018-01-30 NOTE — TELEPHONE ENCOUNTER
Juve Martins is calling from the [id number] [facility] state that she have a question about pt US order requesting a call back

## 2018-01-30 NOTE — TELEPHONE ENCOUNTER
Deedee Mares for ultrasound dept states the kidney ultrasound will not show the adrenal glands. Patient will need to have an MRI. Please advise.

## 2018-02-01 NOTE — TELEPHONE ENCOUNTER
[facility], please advise on any blood test to be done prior to MRI

## 2018-02-01 NOTE — TELEPHONE ENCOUNTER
Pt called back and was inform of Jose Score message below. Pt wants to know if she needs blood work done?

## 2018-02-01 NOTE — TELEPHONE ENCOUNTER
LMTCB-ext 19663-Qabyq generated for MRI. Derrick Arias from Ultrasound informed of US order being cancelled.

## 2018-02-15 ENCOUNTER — APPOINTMENT (OUTPATIENT)
Dept: LAB | Age: 83
End: 2018-02-15
Attending: FAMILY MEDICINE
Payer: MEDICARE

## 2018-02-15 ENCOUNTER — HOSPITAL ENCOUNTER (OUTPATIENT)
Dept: MRI IMAGING | Age: 83
Discharge: HOME | End: 2018-02-15
Attending: FAMILY MEDICINE
Payer: MEDICARE

## 2018-02-15 DIAGNOSIS — D35.00 ADRENAL CORTICAL ADENOMA, UNSPECIFIED LATERALITY: ICD-10-CM

## 2018-02-15 DIAGNOSIS — E25.8: ICD-10-CM

## 2018-02-15 LAB
CORTIS SERPL-MCNC: 8 MCG/DL
TSH SERPL-ACNC: 4.16 UIU/ML (ref 0.45–5.33)

## 2018-02-15 PROCEDURE — 82088 ASSAY OF ALDOSTERONE: CPT

## 2018-02-15 PROCEDURE — 36415 COLL VENOUS BLD VENIPUNCTURE: CPT

## 2018-02-15 PROCEDURE — A9575 INJ GADOTERATE MEGLUMI 0.1ML: HCPCS | Performed by: FAMILY MEDICINE

## 2018-02-15 PROCEDURE — 84443 ASSAY THYROID STIM HORMONE: CPT

## 2018-02-15 PROCEDURE — 82533 TOTAL CORTISOL: CPT

## 2018-02-15 PROCEDURE — 74183 MRI ABD W/O CNTR FLWD CNTR: CPT | Performed by: FAMILY MEDICINE

## 2018-02-17 LAB — ALDOSTERONE: 25.5 NG/DL

## 2018-02-22 ENCOUNTER — TELEPHONE (OUTPATIENT)
Dept: OTHER | Age: 83
End: 2018-02-22

## 2018-02-22 NOTE — TELEPHONE ENCOUNTER
----- Message from Marilou Hobson DO sent at 2/20/2018  7:50 PM CST -----  No significant cause of concern for the renal mass is seen on the CT.   They said that they were adrenal adenomas that are benign and we do not have to do any follow-up regarding

## 2018-02-27 RX ORDER — BUDESONIDE 1 MG/2ML
INHALANT ORAL
Qty: 120 ML | Refills: 1 | Status: SHIPPED | OUTPATIENT
Start: 2018-02-27 | End: 2018-04-27

## 2018-04-27 ENCOUNTER — OFFICE VISIT (OUTPATIENT)
Dept: FAMILY MEDICINE CLINIC | Facility: CLINIC | Age: 83
End: 2018-04-27

## 2018-04-27 VITALS
TEMPERATURE: 98 F | DIASTOLIC BLOOD PRESSURE: 75 MMHG | WEIGHT: 145 LBS | HEART RATE: 76 BPM | SYSTOLIC BLOOD PRESSURE: 129 MMHG | BODY MASS INDEX: 28 KG/M2

## 2018-04-27 DIAGNOSIS — E78.00 HIGH CHOLESTEROL: ICD-10-CM

## 2018-04-27 DIAGNOSIS — F99 INSOMNIA DUE TO OTHER MENTAL DISORDER: ICD-10-CM

## 2018-04-27 DIAGNOSIS — K29.50 CHRONIC GASTRITIS WITHOUT BLEEDING, UNSPECIFIED GASTRITIS TYPE: ICD-10-CM

## 2018-04-27 DIAGNOSIS — F51.05 INSOMNIA DUE TO OTHER MENTAL DISORDER: ICD-10-CM

## 2018-04-27 DIAGNOSIS — E25.8: ICD-10-CM

## 2018-04-27 DIAGNOSIS — F33.1 MODERATE EPISODE OF RECURRENT MAJOR DEPRESSIVE DISORDER (HCC): ICD-10-CM

## 2018-04-27 DIAGNOSIS — I48.0 PAROXYSMAL ATRIAL FIBRILLATION (HCC): ICD-10-CM

## 2018-04-27 DIAGNOSIS — J43.9 PULMONARY EMPHYSEMA, UNSPECIFIED EMPHYSEMA TYPE (HCC): Primary | ICD-10-CM

## 2018-04-27 DIAGNOSIS — D64.9 CHRONIC ANEMIA: ICD-10-CM

## 2018-04-27 DIAGNOSIS — M17.0 PRIMARY OSTEOARTHRITIS OF BOTH KNEES: ICD-10-CM

## 2018-04-27 DIAGNOSIS — I42.9 CARDIOMYOPATHY, UNSPECIFIED TYPE (HCC): ICD-10-CM

## 2018-04-27 DIAGNOSIS — M77.11 LATERAL EPICONDYLITIS OF RIGHT ELBOW: ICD-10-CM

## 2018-04-27 PROCEDURE — G0463 HOSPITAL OUTPT CLINIC VISIT: HCPCS | Performed by: FAMILY MEDICINE

## 2018-04-27 PROCEDURE — 99215 OFFICE O/P EST HI 40 MIN: CPT | Performed by: FAMILY MEDICINE

## 2018-04-27 RX ORDER — OMEPRAZOLE 40 MG/1
CAPSULE, DELAYED RELEASE ORAL
Qty: 90 CAPSULE | Refills: 3 | Status: SHIPPED | OUTPATIENT
Start: 2018-04-27 | End: 2018-10-26

## 2018-04-27 NOTE — PROGRESS NOTES
c/o severe rt elbow pain  Is seeing orthopedic    Right leg has bad knee    Uses walker and using rt arm more and then get the elbow pain    I don't think the spiriva is doing anything. I still cough but I think its the copd.     Still has insomnia  Last n or posterior cervical adenopathy there was no supraclavicular adenopathy palpated  Extremities there was no cyanosis or edema  Examination of the right arm demonstrates a bruised swollen right elbow with arthritic changes.   Arthritic changes in the fingers

## 2018-05-10 NOTE — TELEPHONE ENCOUNTER
Hypertensive Medications  Protocol Criteria:  · Appointment scheduled in the past 6 months or in the next 3 months  · BMP or CMP in the past 12 months  · Creatinine result < 2  Recent Outpatient Visits            1 week ago Pulmonary emphysema, unspecified

## 2018-07-05 RX ORDER — ALBUTEROL SULFATE 2.5 MG/3ML
SOLUTION RESPIRATORY (INHALATION)
Qty: 300 ML | Refills: 2 | Status: SHIPPED | OUTPATIENT
Start: 2018-07-05 | End: 2018-11-27

## 2018-07-05 NOTE — TELEPHONE ENCOUNTER
Refill passed per [facility] protocol.     Refill Protocol Appointment Criteria Asthma & COPD:     · Appointment scheduled in the past 6 months or in the next 3 months  Recent Outpatient Visits            1 month ago Obstructive sleep apnea (adult) (pe

## 2018-07-21 NOTE — TELEPHONE ENCOUNTER
Hypertensive Medications  Protocol Criteria:  · Appointment scheduled in the past 6 months or in the next 3 months  · BMP or CMP in the past 12 months  · Creatinine result < 2  Recent Outpatient Visits            2 months ago Obstructive sleep apnea (adult

## 2018-07-24 ENCOUNTER — OFFICE VISIT (OUTPATIENT)
Dept: CARDIOLOGY CLINIC | Facility: CLINIC | Age: 83
End: 2018-07-24
Payer: MEDICARE

## 2018-07-24 VITALS
SYSTOLIC BLOOD PRESSURE: 112 MMHG | HEART RATE: 72 BPM | BODY MASS INDEX: 32.54 KG/M2 | HEIGHT: 58 IN | RESPIRATION RATE: 16 BRPM | WEIGHT: 155 LBS | DIASTOLIC BLOOD PRESSURE: 70 MMHG

## 2018-07-24 DIAGNOSIS — R60.9 EDEMA, UNSPECIFIED TYPE: ICD-10-CM

## 2018-07-24 DIAGNOSIS — E78.00 HIGH CHOLESTEROL: ICD-10-CM

## 2018-07-24 DIAGNOSIS — I42.9 CARDIOMYOPATHY, UNSPECIFIED TYPE (HCC): Primary | ICD-10-CM

## 2018-07-24 DIAGNOSIS — I42.9 CARDIOMYOPATHY, UNSPECIFIED TYPE (HCC): ICD-10-CM

## 2018-07-24 DIAGNOSIS — I48.0 PAROXYSMAL ATRIAL FIBRILLATION (HCC): ICD-10-CM

## 2018-07-24 PROCEDURE — 99214 OFFICE O/P EST MOD 30 MIN: CPT | Performed by: INTERNAL MEDICINE

## 2018-07-24 PROCEDURE — G0463 HOSPITAL OUTPT CLINIC VISIT: HCPCS | Performed by: INTERNAL MEDICINE

## 2018-07-24 RX ORDER — POTASSIUM CHLORIDE 750 MG/1
TABLET, EXTENDED RELEASE ORAL
Qty: 90 TABLET | Refills: 11 | Status: SHIPPED | OUTPATIENT
Start: 2018-07-24 | End: 2019-08-09

## 2018-07-24 NOTE — PROGRESS NOTES
Lorena Quinn is a 81year old female. Patient presents with: Follow - Up    HPI:   This is a pleasant 81year-old with history of atrial fibrillation asthma cardia myopathy and awake clustered presents for follow-up.   Since last seen her main complaint 11   albuterol sulfate (2.5 MG/3ML) 0.083% Inhalation Nebu Soln Take 3 mL (2.5 mg total) by nebulization 4 (four) times daily.  Disp: 120 vial Rfl: 5   Potassium Chloride ER 10 MEQ Oral Tab CR 1 po daily Disp: 90 tablet Rfl: 3   BUDESONIDE 1 MG/2ML Inhalati heartburn  NEURO: denies headaches  Remainder of review of systems is completed and negative    EXAM:   /70   Pulse 72   Resp 16   Ht 4' 10\" (1.473 m)   Wt 155 lb (70.3 kg)   BMI 32.40 kg/m²   GENERAL: well developed, well nourished,in no apparent d

## 2018-08-16 RX ORDER — SPIRONOLACTONE 25 MG/1
TABLET ORAL
Qty: 90 TABLET | Refills: 0 | Status: SHIPPED | OUTPATIENT
Start: 2018-08-16 | End: 2018-10-26

## 2018-08-17 NOTE — TELEPHONE ENCOUNTER
Hypertensive Medications  Protocol Criteria:  · Appointment scheduled in the past 6 months or in the next 3 months  · BMP or CMP in the past 12 months  · Creatinine result < 2  Recent Outpatient Visits            3 weeks ago Cardiomyopathy, unspecified typ

## 2018-08-22 ENCOUNTER — TELEPHONE (OUTPATIENT)
Dept: CARDIOLOGY CLINIC | Facility: CLINIC | Age: 83
End: 2018-08-22

## 2018-08-22 NOTE — TELEPHONE ENCOUNTER
Pharm faxed in refill for     Current Outpatient Prescriptions:                                                                    furosemide 20 MG Oral Tab Take 2 tablets (40 mg total) by mouth daily.  Disp: 60 tablet Rfl: 5

## 2018-08-23 RX ORDER — FUROSEMIDE 20 MG/1
40 TABLET ORAL DAILY
Qty: 90 TABLET | Refills: 1 | Status: SHIPPED | OUTPATIENT
Start: 2018-08-23 | End: 2018-12-28

## 2018-08-23 NOTE — TELEPHONE ENCOUNTER
Re: furosemide reviewed LOV no change in this med  Meets refill criteria, sent per protocol    Hypertensive Medications  Protocol Criteria:  · Appointment scheduled with Cardiology in the past 12 months or in the next 3 months  · BMP or CMP in the past 12 01/10/2018   [facility] [address] (H) 01/10/2018

## 2018-08-29 RX ORDER — BUDESONIDE 1 MG/2ML
INHALANT ORAL
Qty: 120 ML | Refills: 0 | OUTPATIENT
Start: 2018-08-29

## 2018-09-21 ENCOUNTER — APPOINTMENT (OUTPATIENT)
Dept: LAB | Age: 83
End: 2018-09-21
Attending: INTERNAL MEDICINE
Payer: MEDICARE

## 2018-09-21 DIAGNOSIS — I42.9 CARDIOMYOPATHY, UNSPECIFIED TYPE (HCC): ICD-10-CM

## 2018-09-21 DIAGNOSIS — K29.50 CHRONIC GASTRITIS WITHOUT BLEEDING, UNSPECIFIED GASTRITIS TYPE: ICD-10-CM

## 2018-09-21 DIAGNOSIS — E78.00 HIGH CHOLESTEROL: ICD-10-CM

## 2018-09-21 LAB
CHOLEST SERPL-MCNC: 161 MG/DL (ref 110–200)
HDLC SERPL-MCNC: 72 MG/DL
LDLC SERPL CALC-MCNC: 70 MG/DL (ref 0–99)
NONHDLC SERPL-MCNC: 89 MG/DL
TRIGL SERPL-MCNC: 93 MG/DL (ref 1–149)

## 2018-09-21 PROCEDURE — 80061 LIPID PANEL: CPT

## 2018-09-21 PROCEDURE — 36415 COLL VENOUS BLD VENIPUNCTURE: CPT

## 2018-10-19 ENCOUNTER — LAB ENCOUNTER (OUTPATIENT)
Dept: LAB | Age: 83
End: 2018-10-19
Attending: FAMILY MEDICINE
Payer: MEDICARE

## 2018-10-19 DIAGNOSIS — D64.9 CHRONIC ANEMIA: ICD-10-CM

## 2018-10-19 DIAGNOSIS — K29.50 CHRONIC GASTRITIS WITHOUT BLEEDING, UNSPECIFIED GASTRITIS TYPE: ICD-10-CM

## 2018-10-19 PROCEDURE — 85025 COMPLETE CBC W/AUTO DIFF WBC: CPT

## 2018-10-19 PROCEDURE — 36415 COLL VENOUS BLD VENIPUNCTURE: CPT

## 2018-10-19 PROCEDURE — 80053 COMPREHEN METABOLIC PANEL: CPT

## 2018-10-26 ENCOUNTER — OFFICE VISIT (OUTPATIENT)
Dept: FAMILY MEDICINE CLINIC | Facility: CLINIC | Age: 83
End: 2018-10-26
Payer: MEDICARE

## 2018-10-26 VITALS
BODY MASS INDEX: 31 KG/M2 | SYSTOLIC BLOOD PRESSURE: 124 MMHG | TEMPERATURE: 98 F | WEIGHT: 150 LBS | DIASTOLIC BLOOD PRESSURE: 78 MMHG | HEART RATE: 91 BPM

## 2018-10-26 DIAGNOSIS — J43.9 PULMONARY EMPHYSEMA, UNSPECIFIED EMPHYSEMA TYPE (HCC): ICD-10-CM

## 2018-10-26 DIAGNOSIS — Z74.09 DECREASED AMBULATION STATUS: ICD-10-CM

## 2018-10-26 DIAGNOSIS — M17.0 BILATERAL PRIMARY OSTEOARTHRITIS OF KNEE: Primary | ICD-10-CM

## 2018-10-26 DIAGNOSIS — M77.11 LATERAL EPICONDYLITIS OF RIGHT ELBOW: ICD-10-CM

## 2018-10-26 DIAGNOSIS — E78.00 HIGH CHOLESTEROL: ICD-10-CM

## 2018-10-26 PROCEDURE — G0463 HOSPITAL OUTPT CLINIC VISIT: HCPCS | Performed by: FAMILY MEDICINE

## 2018-10-26 PROCEDURE — 99215 OFFICE O/P EST HI 40 MIN: CPT | Performed by: FAMILY MEDICINE

## 2018-10-26 RX ORDER — OMEPRAZOLE 40 MG/1
CAPSULE, DELAYED RELEASE ORAL
Qty: 90 CAPSULE | Refills: 3 | Status: SHIPPED | OUTPATIENT
Start: 2018-10-26 | End: 2019-10-24

## 2018-10-26 RX ORDER — SPIRONOLACTONE 25 MG/1
25 TABLET ORAL
Qty: 90 TABLET | Refills: 3 | Status: SHIPPED | OUTPATIENT
Start: 2018-10-26 | End: 2019-11-17

## 2018-10-26 NOTE — PROGRESS NOTES
Needs to get a rt elbow replacement. Having a lot of trouble with walker. Can't straiten out rt elbow to use regular walker. Ludivina Sutton M.D. Is her pulmonologist.    Still having sob  Dyspnea with any exertion  No chest pain.       \"My knees are really edema  Psych: depressed. Assessment/ Plan  1. Bilateral primary osteoarthritis of knee  Needs standing walker to take load off the right elbow. - DME - EXTERNAL     2.  Lateral epicondylitis of right elbow  Now with severe arthritis restricting use  - D

## 2018-11-02 ENCOUNTER — TELEPHONE (OUTPATIENT)
Dept: CARDIOLOGY CLINIC | Facility: CLINIC | Age: 83
End: 2018-11-02

## 2018-11-02 RX ORDER — PRAVASTATIN SODIUM 20 MG
20 TABLET ORAL NIGHTLY
Qty: 90 TABLET | Refills: 1 | Status: SHIPPED | OUTPATIENT
Start: 2018-11-02 | End: 2019-04-26

## 2018-11-02 NOTE — TELEPHONE ENCOUNTER
Refill protocol criteria met, rx sent.       Cholesterol Medications  Protocol Criteria:  · Appointment scheduled with Cardiology in the past 12 months or in the next 3 months  · ALT & LDL on file in the past 12 months  · ALT result less than 80  · LDL resu

## 2018-11-27 RX ORDER — ALBUTEROL SULFATE 2.5 MG/3ML
SOLUTION RESPIRATORY (INHALATION)
Qty: 300 ML | Refills: 0 | Status: SHIPPED | OUTPATIENT
Start: 2018-11-27 | End: 2019-01-09

## 2018-11-28 NOTE — TELEPHONE ENCOUNTER
Refill Protocol Appointment Criteria  · Appointment scheduled in the past 6 months or in the next 3 months  Recent Outpatient Visits            1 month ago Bilateral primary osteoarthritis of knee    [facility] [place], [address] [facility]

## 2018-12-31 RX ORDER — FUROSEMIDE 20 MG/1
TABLET ORAL
Qty: 180 TABLET | Refills: 1 | Status: SHIPPED | OUTPATIENT
Start: 2018-12-31 | End: 2019-08-19

## 2018-12-31 NOTE — TELEPHONE ENCOUNTER
Furosemide. Verified medication dose, Meets protocol , refill order sent.   Hypertensive Medications  Protocol Criteria:  · Appointment scheduled with Cardiology in the past 12 months or in the next 3 months  · BMP or CMP in the past 12 months  · Potassium: Helen [phone] 10/19/2018

## 2019-01-09 RX ORDER — ALBUTEROL SULFATE 2.5 MG/3ML
SOLUTION RESPIRATORY (INHALATION)
Qty: 300 ML | Refills: 0 | Status: SHIPPED | OUTPATIENT
Start: 2019-01-09 | End: 2019-02-05

## 2019-01-09 NOTE — TELEPHONE ENCOUNTER
Pt called in to f/u on her refill request.  Pt stated that she contact her pharmacy days ago in regards to her refill. Pt stated that she is completely out of medication.   Please advise

## 2019-02-06 RX ORDER — ALBUTEROL SULFATE 2.5 MG/3ML
SOLUTION RESPIRATORY (INHALATION)
Qty: 300 ML | Refills: 0 | Status: SHIPPED | OUTPATIENT
Start: 2019-02-06 | End: 2019-08-28 | Stop reason: ALTCHOICE

## 2019-02-07 ENCOUNTER — NURSE TRIAGE (OUTPATIENT)
Dept: FAMILY MEDICINE CLINIC | Facility: CLINIC | Age: 84
End: 2019-02-07

## 2019-02-07 NOTE — TELEPHONE ENCOUNTER
Action Requested: Summary for Provider     []  Critical Lab, Recommendations Needed  [] Need Additional Advice  []   FYI    []   Need Orders  [] Need Medications Sent to Pharmacy  []  Other     SUMMARY: Appt scheduled for tomorrow 2/8/19 9:30am with Dr [facility]

## 2019-02-08 ENCOUNTER — LAB ENCOUNTER (OUTPATIENT)
Dept: LAB | Age: 84
End: 2019-02-08
Attending: FAMILY MEDICINE
Payer: MEDICARE

## 2019-02-08 ENCOUNTER — OFFICE VISIT (OUTPATIENT)
Dept: FAMILY MEDICINE CLINIC | Facility: CLINIC | Age: 84
End: 2019-02-08
Payer: MEDICARE

## 2019-02-08 VITALS
WEIGHT: 145 LBS | DIASTOLIC BLOOD PRESSURE: 84 MMHG | HEART RATE: 91 BPM | SYSTOLIC BLOOD PRESSURE: 138 MMHG | BODY MASS INDEX: 30.44 KG/M2 | HEIGHT: 58 IN

## 2019-02-08 DIAGNOSIS — N18.30 STAGE 3 CHRONIC KIDNEY DISEASE (HCC): ICD-10-CM

## 2019-02-08 DIAGNOSIS — R19.7 DIARRHEA, UNSPECIFIED TYPE: ICD-10-CM

## 2019-02-08 DIAGNOSIS — D64.9 CHRONIC ANEMIA: ICD-10-CM

## 2019-02-08 DIAGNOSIS — E78.00 HIGH CHOLESTEROL: ICD-10-CM

## 2019-02-08 DIAGNOSIS — Z86.39 H/O HYPOKALEMIA: ICD-10-CM

## 2019-02-08 DIAGNOSIS — R53.83 FATIGUE, UNSPECIFIED TYPE: ICD-10-CM

## 2019-02-08 DIAGNOSIS — R19.7 DIARRHEA, UNSPECIFIED TYPE: Primary | ICD-10-CM

## 2019-02-08 LAB
ADENOVIRUS F 40/41 PCR: NEGATIVE
ALBUMIN SERPL BCP-MCNC: 4.1 G/DL (ref 3.5–4.8)
ALBUMIN/GLOB SERPL: 1.5 {RATIO} (ref 1–2)
ALP SERPL-CCNC: 81 U/L (ref 32–100)
ALT SERPL-CCNC: 19 U/L (ref 14–54)
ANION GAP SERPL CALC-SCNC: 13 MMOL/L (ref 0–18)
AST SERPL-CCNC: 28 U/L (ref 15–41)
ASTROVIRUS PCR: NEGATIVE
BASOPHILS # BLD AUTO: 0.03 X10(3) UL (ref 0–0.2)
BASOPHILS NFR BLD AUTO: 0.6 %
BILIRUB SERPL-MCNC: 0.7 MG/DL (ref 0.3–1.2)
BUN SERPL-MCNC: 15 MG/DL (ref 8–20)
BUN/CREAT SERPL: 13.8 (ref 10–20)
C CAYETANENSIS DNA SPEC QL NAA+PROBE: NEGATIVE
C DIFF TOX B STL QL: NEGATIVE
C. DIFFICILE TOXIN A/B PCR: NEGATIVE
CALCIUM SERPL-MCNC: 9.6 MG/DL (ref 8.5–10.5)
CAMPY SP DNA.DIARRHEA STL QL NAA+PROBE: NEGATIVE
CHLORIDE SERPL-SCNC: 102 MMOL/L (ref 95–110)
CHOLEST SERPL-MCNC: 178 MG/DL (ref 110–200)
CO2 SERPL-SCNC: 24 MMOL/L (ref 22–32)
CREAT SERPL-MCNC: 1.09 MG/DL (ref 0.5–1.5)
CRYPTOSP DNA SPEC QL NAA+PROBE: NEGATIVE
DEPRECATED RDW RBC AUTO: 46.3 FL (ref 35.1–46.3)
EAEC PAA PLAS AGGR+AATA ST NAA+NON-PRB: NEGATIVE
EC STX1+STX2 + H7 FLIC SPEC NAA+PROBE: NEGATIVE
ENTAMOEBA HISTOLYTICA PCR: NEGATIVE
EOSINOPHIL # BLD AUTO: 0.15 X10(3) UL (ref 0–0.7)
EOSINOPHIL NFR BLD AUTO: 3.1 %
EPEC EAE GENE STL QL NAA+NON-PROBE: NEGATIVE
ERYTHROCYTE [DISTWIDTH] IN BLOOD BY AUTOMATED COUNT: 13.2 % (ref 11–15)
ETEC LTA+ST1A+ST1B TOX ST NAA+NON-PROBE: NEGATIVE
GIARDIA LAMBLIA PCR: NEGATIVE
GLOBULIN PLAS-MCNC: 2.7 G/DL (ref 2.5–3.7)
GLUCOSE SERPL-MCNC: 84 MG/DL (ref 70–99)
HCT VFR BLD AUTO: 40.4 % (ref 35–48)
HDLC SERPL-MCNC: 71 MG/DL
HGB BLD-MCNC: 12.8 G/DL (ref 12–16)
IMM GRANULOCYTES # BLD AUTO: 0.01 X10(3) UL (ref 0–1)
IMM GRANULOCYTES NFR BLD: 0.2 %
LDLC SERPL CALC-MCNC: 90 MG/DL (ref 0–99)
LYMPHOCYTES # BLD AUTO: 0.65 X10(3) UL (ref 1–4)
LYMPHOCYTES NFR BLD AUTO: 13.3 %
MCH RBC QN AUTO: 30.3 PG (ref 26–34)
MCHC RBC AUTO-ENTMCNC: 31.7 G/DL (ref 31–37)
MCV RBC AUTO: 95.5 FL (ref 80–100)
MONOCYTES # BLD AUTO: 0.32 X10(3) UL (ref 0.1–1)
MONOCYTES NFR BLD AUTO: 6.5 %
NEUTROPHILS # BLD AUTO: 3.73 X10 (3) UL (ref 1.5–7.7)
NEUTROPHILS # BLD AUTO: 3.73 X10(3) UL (ref 1.5–7.7)
NEUTROPHILS NFR BLD AUTO: 76.3 %
NONHDLC SERPL-MCNC: 107 MG/DL
NOROVIRUS GI/GII PCR: NEGATIVE
OSMOLALITY UR CALC.SUM OF ELEC: 288 MOSM/KG (ref 275–295)
P SHIGELLOIDES DNA STL QL NAA+PROBE: NEGATIVE
PATIENT FASTING: YES
PLATELET # BLD AUTO: 196 10(3)UL (ref 150–450)
POTASSIUM SERPL-SCNC: 4 MMOL/L (ref 3.3–5.1)
PROT SERPL-MCNC: 6.8 G/DL (ref 5.9–8.4)
RBC # BLD AUTO: 4.23 X10(6)UL (ref 3.8–5.3)
ROTAVIRUS A PCR: NEGATIVE
SALMONELLA DNA SPEC QL NAA+PROBE: NEGATIVE
SAPOVIRUS PCR: NEGATIVE
SHIGELLA SP+EIEC IPAH ST NAA+NON-PROBE: NEGATIVE
SODIUM SERPL-SCNC: 139 MMOL/L (ref 136–144)
TRIGL SERPL-MCNC: 86 MG/DL (ref 1–149)
TSH SERPL-ACNC: 3.78 UIU/ML (ref 0.45–5.33)
V CHOLERAE DNA SPEC QL NAA+PROBE: NEGATIVE
VIBRIO DNA SPEC NAA+PROBE: NEGATIVE
WBC # BLD AUTO: 4.9 X10(3) UL (ref 4–11)
YERSINIA DNA SPEC NAA+PROBE: NEGATIVE

## 2019-02-08 PROCEDURE — 84443 ASSAY THYROID STIM HORMONE: CPT

## 2019-02-08 PROCEDURE — 87045 FECES CULTURE AEROBIC BACT: CPT

## 2019-02-08 PROCEDURE — G0463 HOSPITAL OUTPT CLINIC VISIT: HCPCS | Performed by: FAMILY MEDICINE

## 2019-02-08 PROCEDURE — 99215 OFFICE O/P EST HI 40 MIN: CPT | Performed by: FAMILY MEDICINE

## 2019-02-08 PROCEDURE — 87427 SHIGA-LIKE TOXIN AG IA: CPT

## 2019-02-08 PROCEDURE — 87507 IADNA-DNA/RNA PROBE TQ 12-25: CPT | Performed by: FAMILY MEDICINE

## 2019-02-08 PROCEDURE — 85025 COMPLETE CBC W/AUTO DIFF WBC: CPT

## 2019-02-08 PROCEDURE — 80061 LIPID PANEL: CPT

## 2019-02-08 PROCEDURE — 87046 STOOL CULTR AEROBIC BACT EA: CPT

## 2019-02-08 PROCEDURE — 36415 COLL VENOUS BLD VENIPUNCTURE: CPT

## 2019-02-08 PROCEDURE — 80053 COMPREHEN METABOLIC PANEL: CPT

## 2019-02-08 PROCEDURE — 87493 C DIFF AMPLIFIED PROBE: CPT

## 2019-02-08 RX ORDER — DIPHENOXYLATE HYDROCHLORIDE AND ATROPINE SULFATE 2.5; .025 MG/1; MG/1
2 TABLET ORAL 4 TIMES DAILY PRN
Qty: 15 TABLET | Refills: 1 | Status: SHIPPED | OUTPATIENT
Start: 2019-02-08 | End: 2019-03-07 | Stop reason: ALTCHOICE

## 2019-02-08 NOTE — PROGRESS NOTES
4 weeks ago started having diarrhea. Has been drinking more coffee  4 x day loose and watery  Has lost weight. Had years of constipation. No fever. Not a lot of cramping  No abd pain. Does have urgency.     Hasn't been taking calcium  Has been taking LIPID PANEL; Future  - ASSAY, THYROID STIM HORMONE; Future    3. Stage 3 chronic kidney disease ([facility] [address]  recheck  - COMP METABOLIC PANEL (14); Future  - CBC WITH DIFFERENTIAL WITH PLATELET; Future  - LIPID PANEL;  Future  - ASSAY, THYROID STIM HORMONE; Future

## 2019-02-13 ENCOUNTER — TELEPHONE (OUTPATIENT)
Dept: FAMILY MEDICINE CLINIC | Facility: CLINIC | Age: 84
End: 2019-02-13

## 2019-02-13 NOTE — TELEPHONE ENCOUNTER
----- Message from Renetta Baldwin DO sent at 2/11/2019  4:54 PM CST -----  C. difficile testing was negative.   Please let me know if she still having diarrhea

## 2019-02-28 ENCOUNTER — OFFICE VISIT (OUTPATIENT)
Dept: CARDIOLOGY CLINIC | Facility: CLINIC | Age: 84
End: 2019-02-28
Payer: MEDICARE

## 2019-02-28 VITALS
BODY MASS INDEX: 30 KG/M2 | WEIGHT: 142 LBS | DIASTOLIC BLOOD PRESSURE: 72 MMHG | SYSTOLIC BLOOD PRESSURE: 116 MMHG | RESPIRATION RATE: 20 BRPM | HEART RATE: 84 BPM

## 2019-02-28 DIAGNOSIS — R60.9 EDEMA, UNSPECIFIED TYPE: ICD-10-CM

## 2019-02-28 DIAGNOSIS — E78.00 HIGH CHOLESTEROL: ICD-10-CM

## 2019-02-28 DIAGNOSIS — I42.9 CARDIOMYOPATHY, UNSPECIFIED TYPE (HCC): ICD-10-CM

## 2019-02-28 DIAGNOSIS — I48.0 PAROXYSMAL ATRIAL FIBRILLATION (HCC): Primary | ICD-10-CM

## 2019-02-28 PROCEDURE — 99214 OFFICE O/P EST MOD 30 MIN: CPT | Performed by: INTERNAL MEDICINE

## 2019-02-28 PROCEDURE — G0463 HOSPITAL OUTPT CLINIC VISIT: HCPCS | Performed by: INTERNAL MEDICINE

## 2019-02-28 NOTE — PROGRESS NOTES
Marlee Brewer is a 81year old female. Patient presents with:   Follow - Up  Atrial Fibrillation  Cardiomyopathy  Edema: Lower legs     HPI:   This is a pleasant 81year-old with history of prior A. fib and asthma cardiomyopathy and elevated cholesterol p every 4 (four) hours as needed for Wheezing. Disp: 3 Inhaler Rfl: 2   Misc Natural Products (GLUCOSAMINE CHOND COMPLEX/MSM OR) Take by mouth. Disp:  Rfl:    Omega 3-6-9 Fatty Acids (OMEGA-3-6-9 OR) Take by mouth.  Disp:  Rfl:    Sertraline HCl 100 MG Oral T APPENDIX, NEPHEW, Hilda Sidle, NEPHEW   • Hypertension Mother    • Heart Disease Mother [de-identified]        CAD, CAUSE OF DEATH   • Cancer Paternal Grandmother    • Cancer Brother         prostate   • Heart Disease Father         PREMATURE CAD   • Alcohol and Other [facility] will call if changes. She needs to keep her feet up more and avoid salt for mild leg swelling. The patient indicates understanding of these issues and agrees to the plan. The patient is asked to return in 6 months.              Manette Dance, MD  2/28

## 2019-02-28 NOTE — PATIENT INSTRUCTIONS
Start Ecotrin which is 81 mg coated aspirin daily with food  Keep feet up when sitting for long periods of time  Continue same meds  Recheck cholesterol levels in 6 months

## 2019-04-29 RX ORDER — PRAVASTATIN SODIUM 20 MG
TABLET ORAL
Qty: 90 TABLET | Refills: 1 | Status: SHIPPED | OUTPATIENT
Start: 2019-04-29 | End: 2019-11-07

## 2019-04-29 NOTE — TELEPHONE ENCOUNTER
Pravastatin LOV reviewed. No change in this medication. Meets refill protocol criteria. Refill sent.     Cholesterol Medications  Protocol Criteria:  · Appointment scheduled with Cardiology in the past 12 months or in the next 3 months  · ALT, AST & LDL on

## 2019-08-09 DIAGNOSIS — I42.9 CARDIOMYOPATHY, UNSPECIFIED TYPE (HCC): ICD-10-CM

## 2019-08-11 NOTE — TELEPHONE ENCOUNTER
Review pended refill request as it does not fall under a protocol.     Last Rx: 18 for #90 + 11 (-not on current medication list)  LOV: 19  Requested Prescriptions     Pending Prescriptions Disp Refills   • KLOR-CON M10 10 MEQ Oral Tab CR [

## 2019-08-12 RX ORDER — POTASSIUM CHLORIDE 10 MEQ/1
TABLET, EXTENDED RELEASE ORAL
Qty: 90 TABLET | Refills: 11 | Status: SHIPPED | OUTPATIENT
Start: 2019-08-12 | End: 2020-01-01

## 2019-08-19 ENCOUNTER — APPOINTMENT (OUTPATIENT)
Dept: LAB | Age: 84
End: 2019-08-19
Attending: INTERNAL MEDICINE
Payer: MEDICARE

## 2019-08-19 DIAGNOSIS — E78.00 HIGH CHOLESTEROL: ICD-10-CM

## 2019-08-19 LAB
ALT SERPL-CCNC: 20 U/L (ref 13–56)
ANION GAP SERPL CALC-SCNC: 8 MMOL/L (ref 0–18)
AST SERPL-CCNC: 16 U/L (ref 15–37)
BUN BLD-MCNC: 18 MG/DL (ref 7–18)
BUN/CREAT SERPL: 17.1 (ref 10–20)
CALCIUM BLD-MCNC: 9.4 MG/DL (ref 8.5–10.1)
CHLORIDE SERPL-SCNC: 109 MMOL/L (ref 98–112)
CHOLEST SMN-MCNC: 150 MG/DL (ref ?–200)
CO2 SERPL-SCNC: 29 MMOL/L (ref 21–32)
CREAT BLD-MCNC: 1.05 MG/DL (ref 0.55–1.02)
GLUCOSE BLD-MCNC: 86 MG/DL (ref 70–99)
HDLC SERPL-MCNC: 69 MG/DL (ref 40–59)
LDLC SERPL CALC-MCNC: 64 MG/DL (ref ?–100)
NONHDLC SERPL-MCNC: 81 MG/DL (ref ?–130)
OSMOLALITY SERPL CALC.SUM OF ELEC: 303 MOSM/KG (ref 275–295)
PATIENT FASTING: YES
PATIENT FASTING: YES
POTASSIUM SERPL-SCNC: 4.3 MMOL/L (ref 3.5–5.1)
SODIUM SERPL-SCNC: 146 MMOL/L (ref 136–145)
TRIGL SERPL-MCNC: 86 MG/DL (ref 30–149)
VLDLC SERPL CALC-MCNC: 17 MG/DL (ref 0–30)

## 2019-08-19 PROCEDURE — 80061 LIPID PANEL: CPT

## 2019-08-19 PROCEDURE — 36415 COLL VENOUS BLD VENIPUNCTURE: CPT

## 2019-08-19 PROCEDURE — 80048 BASIC METABOLIC PNL TOTAL CA: CPT

## 2019-08-19 PROCEDURE — 84460 ALANINE AMINO (ALT) (SGPT): CPT

## 2019-08-19 PROCEDURE — 84450 TRANSFERASE (AST) (SGOT): CPT

## 2019-08-21 RX ORDER — FUROSEMIDE 20 MG/1
TABLET ORAL
Qty: 180 TABLET | Refills: 1 | Status: SHIPPED | OUTPATIENT
Start: 2019-08-21 | End: 2020-02-27

## 2019-08-21 NOTE — TELEPHONE ENCOUNTER
Refill protocol criteria met, rx sent.       Hypertensive Medications  Protocol Criteria:  · Appointment scheduled with Cardiology in the past 12 months or in the next 3 months  · BMP or CMP in the past 12 months  · Potassium: 3.1 - 4.9 mmol/L  · Creatinine 08/19/2019    CO2 29.0 08/19/2019    GLOBULIN 2.7 02/08/2019    AGRATIO 1.5 07/02/2015    ANIONGAP 8 08/19/2019    [facility] (H) 08/19/2019

## 2019-08-27 ENCOUNTER — OFFICE VISIT (OUTPATIENT)
Dept: CARDIOLOGY CLINIC | Facility: CLINIC | Age: 84
End: 2019-08-27
Payer: MEDICARE

## 2019-08-27 VITALS
WEIGHT: 146 LBS | SYSTOLIC BLOOD PRESSURE: 127 MMHG | DIASTOLIC BLOOD PRESSURE: 81 MMHG | OXYGEN SATURATION: 94 % | BODY MASS INDEX: 29 KG/M2 | HEART RATE: 92 BPM | RESPIRATION RATE: 22 BRPM

## 2019-08-27 DIAGNOSIS — R60.9 EDEMA, UNSPECIFIED TYPE: ICD-10-CM

## 2019-08-27 DIAGNOSIS — I42.9 CARDIOMYOPATHY, UNSPECIFIED TYPE (HCC): ICD-10-CM

## 2019-08-27 DIAGNOSIS — I48.91 ATRIAL FIBRILLATION, UNSPECIFIED TYPE (HCC): Primary | ICD-10-CM

## 2019-08-27 PROCEDURE — G0463 HOSPITAL OUTPT CLINIC VISIT: HCPCS | Performed by: INTERNAL MEDICINE

## 2019-08-27 PROCEDURE — 93010 ELECTROCARDIOGRAM REPORT: CPT | Performed by: INTERNAL MEDICINE

## 2019-08-27 PROCEDURE — 93005 ELECTROCARDIOGRAM TRACING: CPT | Performed by: INTERNAL MEDICINE

## 2019-08-27 PROCEDURE — 99214 OFFICE O/P EST MOD 30 MIN: CPT | Performed by: INTERNAL MEDICINE

## 2019-08-27 RX ORDER — MIRTAZAPINE 15 MG/1
TABLET, FILM COATED ORAL
Refills: 0 | COMMUNITY
Start: 2019-07-11 | End: 2019-08-27 | Stop reason: ALTCHOICE

## 2019-08-27 NOTE — PROGRESS NOTES
[facility] CLINIC  PROGRESS NOTE    Rigoberto Jolly is a 81year old female. Patient presents with:   Follow - Up: 6 month   Cardiomyopathy  Atrial Fibrillation  Edema: Lower extremities     HPI:   This is a pleasant 81year old female with history of prior A Tab Take 1 tablet (25 mg total) by mouth once daily.  Disp: 90 tablet Rfl: 3   Omeprazole 40 MG Oral Capsule Delayed Release TAKE ONE CAPSULE BY MOUTH EVERY NIGHT AT BEDTIME Disp: 90 capsule Rfl: 3   Misc Natural Products (GLUCOSAMINE CHOND COMPLEX/MSM OR) Relation Age of Onset   • Cancer Other         APPENDIX, NEPHEW, Jelly Pardo, NEPHEW   • Hypertension Mother    • Heart Disease Mother [de-identified]        CAD, CAUSE OF DEATH   • Cancer Paternal Grandmother    • Cancer Brother         prostate   • Heart Disease Father has no  chest pain or new symptomatic arrhythmias. With history will obtain echo to reassess LV function pressures and valve disease. Patient will monitor leg swelling which may in part be due to recent trauma and if no change call in a week.   If swellin

## 2019-08-27 NOTE — PATIENT INSTRUCTIONS
Call the office with updated medicine list    Monitor your leg swelling over the next week and if no improvement please call for additional recommendations    Try to keep your feet up when sitting avoid salt and walk when able to help with leg swelling

## 2019-08-28 ENCOUNTER — TELEPHONE (OUTPATIENT)
Dept: CARDIOLOGY CLINIC | Facility: CLINIC | Age: 84
End: 2019-08-28

## 2019-08-28 RX ORDER — MIRTAZAPINE 15 MG/1
15 TABLET, FILM COATED ORAL NIGHTLY
COMMUNITY
End: 2020-10-01

## 2019-08-28 NOTE — TELEPHONE ENCOUNTER
Pt calling with update on prescriptions       Current Outpatient Medications:   •  Arformoterol Tartrate (BROVANA) 15 MCG/2ML Inhalation Nebu Soln, Take 2 mL (15 mcg total) by nebulization 2 (two) times daily. , Disp: 60 vial, Rfl: 5    •  budesonide 0.5 MG

## 2019-09-09 ENCOUNTER — HOSPITAL ENCOUNTER (OUTPATIENT)
Dept: CV DIAGNOSTICS | Age: 84
Discharge: HOME | End: 2019-09-09
Attending: INTERNAL MEDICINE
Payer: MEDICARE

## 2019-09-09 ENCOUNTER — TELEPHONE (OUTPATIENT)
Dept: CARDIOLOGY CLINIC | Facility: CLINIC | Age: 84
End: 2019-09-09

## 2019-09-09 DIAGNOSIS — I42.9 CARDIOMYOPATHY, UNSPECIFIED TYPE (HCC): ICD-10-CM

## 2019-09-09 DIAGNOSIS — R60.9 EDEMA, UNSPECIFIED TYPE: ICD-10-CM

## 2019-09-09 DIAGNOSIS — I48.91 ATRIAL FIBRILLATION, UNSPECIFIED TYPE (HCC): ICD-10-CM

## 2019-09-09 PROCEDURE — 93306 TTE W/DOPPLER COMPLETE: CPT | Performed by: INTERNAL MEDICINE

## 2019-09-09 RX ORDER — TRAMADOL HYDROCHLORIDE 50 MG/1
TABLET ORAL
Refills: 0 | COMMUNITY
Start: 2019-09-04 | End: 2020-08-11

## 2019-09-10 ENCOUNTER — TELEPHONE (OUTPATIENT)
Dept: CARDIOLOGY CLINIC | Facility: CLINIC | Age: 84
End: 2019-09-10

## 2019-09-10 NOTE — TELEPHONE ENCOUNTER
S/w Mira, she said she will take her water pill more faithfully. She has a 9am meeting so she doesn't take it prior to the meeting and sometimes forgets.  States her wt is usually within 1 - 2 lbs, elevates her legs by laying down for an hour between thin

## 2019-09-10 NOTE — TELEPHONE ENCOUNTER
Notes recorded by DELGADO Coffman on 9/10/2019 at 1:08 PM CDT  Reviewed echo with MDB slightly lower EF, as long as pt feels ok CPM    1st time phone rang then fast busy, second call a woman answered and phone and crescencio moved.   Message left on s

## 2019-10-26 RX ORDER — OMEPRAZOLE 40 MG/1
CAPSULE, DELAYED RELEASE ORAL
Qty: 90 CAPSULE | Refills: 1 | Status: SHIPPED | OUTPATIENT
Start: 2019-10-26 | End: 2020-04-22

## 2019-10-26 NOTE — TELEPHONE ENCOUNTER
Refill passed per [facility] protocol.   Refill Protocol Appointment Criteria  · Appointment scheduled in the past 12 months or in the next 3 months  Recent Outpatient Visits            2 months ago Atrial fibrillation, unspecified type ([facility]    Zora Us

## 2019-11-08 RX ORDER — PRAVASTATIN SODIUM 20 MG
TABLET ORAL
Qty: 90 TABLET | Refills: 1 | Status: SHIPPED | OUTPATIENT
Start: 2019-11-08 | End: 2020-03-10

## 2019-11-08 NOTE — TELEPHONE ENCOUNTER
Verified medication dose, Meets protocol , refill order sent  Cholesterol Medications  Protocol Criteria:  · Appointment scheduled with Cardiology in the past 12 months or in the next 3 months  · ALT, AST & LDL on file in the past 12 months  · ALT result l

## 2019-11-17 NOTE — TELEPHONE ENCOUNTER
Dr. Loida Garcia: please review high alert: Co-administration of Potassium Preparations and Potassium-Sparing Diuretics may cause hyperkalemia, possibly leading to cardiac arrhythmias or cardiac arrest.     90 day refill given on 11/17/19, appointment needed f CO2 29.0 08/19/2019    GLOBULIN 2.7 02/08/2019    AGRATIO 1.5 07/02/2015    ANIONGAP 8 08/19/2019    [facility] 303 (H) 08/19/2019

## 2019-11-18 RX ORDER — SPIRONOLACTONE 25 MG/1
TABLET ORAL
Qty: 90 TABLET | Refills: 0 | Status: SHIPPED | OUTPATIENT
Start: 2019-11-18 | End: 2020-02-13

## 2020-01-01 ENCOUNTER — VIRTUAL PHONE E/M (OUTPATIENT)
Dept: CARDIOLOGY CLINIC | Facility: CLINIC | Age: 85
End: 2020-01-01
Payer: MEDICARE

## 2020-01-01 ENCOUNTER — TELEPHONE (OUTPATIENT)
Dept: CARDIOLOGY CLINIC | Facility: CLINIC | Age: 85
End: 2020-01-01

## 2020-01-01 ENCOUNTER — TELEMEDICINE (OUTPATIENT)
Dept: INTERNAL MEDICINE CLINIC | Facility: CLINIC | Age: 85
End: 2020-01-01
Payer: MEDICARE

## 2020-01-01 ENCOUNTER — TELEPHONE (OUTPATIENT)
Dept: GASTROENTEROLOGY | Facility: CLINIC | Age: 85
End: 2020-01-01

## 2020-01-01 ENCOUNTER — TELEPHONE (OUTPATIENT)
Dept: INTERNAL MEDICINE CLINIC | Facility: CLINIC | Age: 85
End: 2020-01-01

## 2020-01-01 ENCOUNTER — LAB ENCOUNTER (OUTPATIENT)
Dept: LAB | Age: 85
End: 2020-01-01
Attending: INTERNAL MEDICINE
Payer: MEDICARE

## 2020-01-01 ENCOUNTER — NURSE TRIAGE (OUTPATIENT)
Dept: INTERNAL MEDICINE CLINIC | Facility: CLINIC | Age: 85
End: 2020-01-01

## 2020-01-01 ENCOUNTER — HOSPITAL ENCOUNTER (OUTPATIENT)
Age: 85
Discharge: HOME | End: 2020-01-01
Attending: EMERGENCY MEDICINE
Payer: MEDICARE

## 2020-01-01 ENCOUNTER — OFFICE VISIT (OUTPATIENT)
Dept: GASTROENTEROLOGY | Facility: CLINIC | Age: 85
End: 2020-01-01
Payer: MEDICARE

## 2020-01-01 VITALS
SYSTOLIC BLOOD PRESSURE: 124 MMHG | BODY MASS INDEX: 22.15 KG/M2 | OXYGEN SATURATION: 97 % | RESPIRATION RATE: 18 BRPM | HEIGHT: 63 IN | WEIGHT: 125 LBS | TEMPERATURE: 99 F | HEART RATE: 51 BPM | DIASTOLIC BLOOD PRESSURE: 92 MMHG

## 2020-01-01 VITALS
SYSTOLIC BLOOD PRESSURE: 112 MMHG | HEART RATE: 90 BPM | DIASTOLIC BLOOD PRESSURE: 93 MMHG | BODY MASS INDEX: 22.15 KG/M2 | WEIGHT: 125 LBS | RESPIRATION RATE: 18 BRPM | HEIGHT: 63 IN

## 2020-01-01 VITALS
HEART RATE: 99 BPM | SYSTOLIC BLOOD PRESSURE: 121 MMHG | HEIGHT: 64 IN | BODY MASS INDEX: 21 KG/M2 | DIASTOLIC BLOOD PRESSURE: 72 MMHG

## 2020-01-01 DIAGNOSIS — I42.9 CARDIOMYOPATHY, UNSPECIFIED TYPE (HCC): ICD-10-CM

## 2020-01-01 DIAGNOSIS — R11.0 NAUSEA: ICD-10-CM

## 2020-01-01 DIAGNOSIS — E78.00 HIGH CHOLESTEROL: ICD-10-CM

## 2020-01-01 DIAGNOSIS — K44.9 HIATAL HERNIA WITH GERD: Primary | ICD-10-CM

## 2020-01-01 DIAGNOSIS — K86.3 PANCREATIC PSEUDOCYST: ICD-10-CM

## 2020-01-01 DIAGNOSIS — K22.70 BARRETT'S ESOPHAGUS WITHOUT DYSPLASIA: ICD-10-CM

## 2020-01-01 DIAGNOSIS — K21.9 HIATAL HERNIA WITH GERD: Primary | ICD-10-CM

## 2020-01-01 DIAGNOSIS — I48.91 ATRIAL FIBRILLATION, UNSPECIFIED TYPE (HCC): Primary | ICD-10-CM

## 2020-01-01 DIAGNOSIS — R14.2 BURPING: Primary | ICD-10-CM

## 2020-01-01 DIAGNOSIS — I48.91 ATRIAL FIBRILLATION, UNSPECIFIED TYPE (HCC): ICD-10-CM

## 2020-01-01 DIAGNOSIS — K21.9 GASTROESOPHAGEAL REFLUX DISEASE, UNSPECIFIED WHETHER ESOPHAGITIS PRESENT: Primary | ICD-10-CM

## 2020-01-01 DIAGNOSIS — I34.0 NONRHEUMATIC MITRAL VALVE REGURGITATION: Primary | ICD-10-CM

## 2020-01-01 DIAGNOSIS — I34.0 NONRHEUMATIC MITRAL VALVE REGURGITATION: ICD-10-CM

## 2020-01-01 DIAGNOSIS — R14.2 BELCHING: ICD-10-CM

## 2020-01-01 PROCEDURE — 99214 OFFICE O/P EST MOD 30 MIN: CPT | Performed by: NURSE PRACTITIONER

## 2020-01-01 PROCEDURE — 99214 OFFICE O/P EST MOD 30 MIN: CPT | Performed by: EMERGENCY MEDICINE

## 2020-01-01 PROCEDURE — 99214 OFFICE O/P EST MOD 30 MIN: CPT | Performed by: INTERNAL MEDICINE

## 2020-01-01 PROCEDURE — G0463 HOSPITAL OUTPT CLINIC VISIT: HCPCS | Performed by: NURSE PRACTITIONER

## 2020-01-01 PROCEDURE — 36415 COLL VENOUS BLD VENIPUNCTURE: CPT

## 2020-01-01 PROCEDURE — 80048 BASIC METABOLIC PNL TOTAL CA: CPT

## 2020-01-01 PROCEDURE — 99442 PHONE E/M BY PHYS 11-20 MIN: CPT | Performed by: INTERNAL MEDICINE

## 2020-01-01 PROCEDURE — 93000 ELECTROCARDIOGRAM COMPLETE: CPT | Performed by: EMERGENCY MEDICINE

## 2020-01-01 RX ORDER — OMEPRAZOLE 40 MG/1
CAPSULE, DELAYED RELEASE ORAL
Qty: 180 CAPSULE | Refills: 5 | Status: SHIPPED | OUTPATIENT
Start: 2020-01-01

## 2020-01-01 RX ORDER — PRAVASTATIN SODIUM 20 MG
TABLET ORAL
Qty: 90 TABLET | Refills: 3 | Status: SHIPPED | OUTPATIENT
Start: 2020-01-01

## 2020-01-01 RX ORDER — MIRTAZAPINE 15 MG/1
TABLET, FILM COATED ORAL
COMMUNITY
Start: 2020-08-14 | End: 2021-01-01

## 2020-01-01 RX ORDER — SPIRONOLACTONE 25 MG/1
25 TABLET ORAL 2 TIMES DAILY
Qty: 180 TABLET | Refills: 3 | Status: SHIPPED | OUTPATIENT
Start: 2020-01-01 | End: 2021-01-01

## 2020-01-01 RX ORDER — RIVAROXABAN 15 MG/1
15 TABLET, FILM COATED ORAL DAILY
Qty: 90 TABLET | Refills: 3 | Status: SHIPPED | OUTPATIENT
Start: 2020-01-01

## 2020-01-01 RX ORDER — CLOTRIMAZOLE 10 MG/1
10 LOZENGE ORAL; TOPICAL
Status: ON HOLD | COMMUNITY
End: 2021-01-01 | Stop reason: ALTCHOICE

## 2020-01-01 RX ORDER — FUROSEMIDE 40 MG/1
40 TABLET ORAL DAILY
COMMUNITY
End: 2020-01-01

## 2020-01-01 RX ORDER — FUROSEMIDE 40 MG/1
40 TABLET ORAL DAILY
Qty: 90 TABLET | Refills: 3 | Status: SHIPPED | OUTPATIENT
Start: 2020-01-01 | End: 2021-01-01 | Stop reason: DRUGHIGH

## 2020-01-01 RX ORDER — POTASSIUM CHLORIDE 750 MG/1
10 TABLET, EXTENDED RELEASE ORAL DAILY
Qty: 90 TABLET | Refills: 3 | Status: ON HOLD | OUTPATIENT
Start: 2020-01-01 | End: 2021-01-01

## 2020-01-01 RX ORDER — FERROUS SULFATE 325(65) MG
TABLET ORAL
COMMUNITY
Start: 2020-08-14 | End: 2021-01-01

## 2020-02-12 NOTE — TELEPHONE ENCOUNTER
Called patient regarding prescription refill request for Spironolactone. Medication last filled 11/18/2019 and was instructed to make an appointment at that time.   Patient states she was unaware of that request.  States she has to check with her sons sche Component Value Date    GLU 86 08/19/2019    BUN 18 08/19/2019    CREATSERUM 1.05 (H) 08/19/2019    BUNCREA 17.1 08/19/2019    GFRNAA 49 (L) 08/19/2019    GFRAA 57 (L) 08/19/2019    CA 9.4 08/19/2019    ALKPHOS 76 07/02/2015    AST 16 08/19/2019    ALT 2

## 2020-02-13 RX ORDER — SPIRONOLACTONE 25 MG/1
TABLET ORAL
Qty: 90 TABLET | Refills: 0 | Status: SHIPPED | OUTPATIENT
Start: 2020-02-13 | End: 2020-04-28

## 2020-02-27 ENCOUNTER — TELEPHONE (OUTPATIENT)
Dept: CARDIOLOGY CLINIC | Facility: CLINIC | Age: 85
End: 2020-02-27

## 2020-02-27 RX ORDER — FUROSEMIDE 20 MG/1
TABLET ORAL
Qty: 180 TABLET | Refills: 1 | Status: SHIPPED | OUTPATIENT
Start: 2020-02-27 | End: 2020-09-15

## 2020-02-27 NOTE — TELEPHONE ENCOUNTER
Verified medication dose,creatinine slightly elevated but less than prior result. Meets protocol , refill order sent.   Hypertensive Medications  Protocol Criteria:  · Appointment scheduled with Cardiology in the past 12 months or in the next 3 months  · B  08/19/2019    CO2 29.0 08/19/2019    GLOBULIN 2.7 02/08/2019    AGRATIO 1.5 07/02/2015    ANIONGAP 8 08/19/2019    [facility] 303 (H) 08/19/2019

## 2020-03-10 ENCOUNTER — OFFICE VISIT (OUTPATIENT)
Dept: CARDIOLOGY CLINIC | Facility: CLINIC | Age: 85
End: 2020-03-10
Payer: MEDICARE

## 2020-03-10 VITALS
HEIGHT: 59 IN | WEIGHT: 149.19 LBS | RESPIRATION RATE: 18 BRPM | SYSTOLIC BLOOD PRESSURE: 123 MMHG | DIASTOLIC BLOOD PRESSURE: 78 MMHG | BODY MASS INDEX: 30.08 KG/M2 | HEART RATE: 96 BPM

## 2020-03-10 DIAGNOSIS — R60.9 EDEMA, UNSPECIFIED TYPE: ICD-10-CM

## 2020-03-10 DIAGNOSIS — E78.00 HIGH CHOLESTEROL: ICD-10-CM

## 2020-03-10 DIAGNOSIS — I48.91 ATRIAL FIBRILLATION, UNSPECIFIED TYPE (HCC): Primary | ICD-10-CM

## 2020-03-10 DIAGNOSIS — I42.9 CARDIOMYOPATHY, UNSPECIFIED TYPE (HCC): ICD-10-CM

## 2020-03-10 PROCEDURE — 99214 OFFICE O/P EST MOD 30 MIN: CPT | Performed by: INTERNAL MEDICINE

## 2020-03-10 PROCEDURE — G0463 HOSPITAL OUTPT CLINIC VISIT: HCPCS | Performed by: INTERNAL MEDICINE

## 2020-03-10 RX ORDER — PRAVASTATIN SODIUM 20 MG
TABLET ORAL
Qty: 90 TABLET | Refills: 1 | Status: SHIPPED | OUTPATIENT
Start: 2020-03-10 | End: 2020-09-15

## 2020-03-10 NOTE — PROGRESS NOTES
[facility] CLINIC  PROGRESS NOTE    Oneida Inman is a 81year old female. Patient presents with:   Follow - Up: 6 month  Leg Swelling    HPI:   This is a pleasant 81year old female with history of asthma A. fib cardiomyopathy milligrams per presents for f needed for Wheezing. 1 Inhaler 3   • budesonide (PULMICORT) 0.5 MG/2ML Inhalation Suspension Take 2 mL (0.5 mg total) by nebulization daily.  180 ampule 1   • traMADol HCl 50 MG Oral Tab TK ONE T PO Q 6 H PRN  0   • Arformoterol Tartrate (BROVANA) 15 MCG/2M lesions or rashes  RESPIRATORY: denies shortness of breath with exertion  CARDIOVASCULAR:See HPI  GI: denies abdominal pain and denies heartburn  NEURO: denies headaches  Remainder of review of systems is completed and negative    EXAM:   /78   Pulse follow clinically. Repeat echo in 6 months to reassess sooner if new symptoms. Patient will recheck labs by follow-up visit in 6 months. Encouraged to keep her feet up avoid salt and walk more if possible.   Her cardiomyopathy is stable and she is functi

## 2020-03-10 NOTE — PATIENT INSTRUCTIONS
Schedule echocardiogram to be done just prior to follow-up visit    To help with leg swelling keep feet elevated, avoid salt, use support stockings,walk when able    Recheck cholesterol labs just prior to follow-up visit if not done by primary    Call if c

## 2020-04-22 RX ORDER — OMEPRAZOLE 40 MG/1
CAPSULE, DELAYED RELEASE ORAL
Qty: 90 CAPSULE | Refills: 0 | Status: SHIPPED | OUTPATIENT
Start: 2020-04-22 | End: 2020-08-11

## 2020-04-28 ENCOUNTER — TELEPHONE (OUTPATIENT)
Dept: FAMILY MEDICINE CLINIC | Facility: CLINIC | Age: 85
End: 2020-04-28

## 2020-04-28 RX ORDER — SPIRONOLACTONE 25 MG/1
25 TABLET ORAL DAILY
Qty: 90 TABLET | Refills: 0 | Status: SHIPPED | OUTPATIENT
Start: 2020-04-28 | End: 2020-01-01

## 2020-08-03 ENCOUNTER — NURSE TRIAGE (OUTPATIENT)
Dept: FAMILY MEDICINE CLINIC | Facility: CLINIC | Age: 85
End: 2020-08-03

## 2020-08-03 NOTE — TELEPHONE ENCOUNTER
Action Requested: Summary for Provider     []  Critical Lab, Recommendations Needed  [] Need Additional Advice  []   FYI    []   Need Orders  [] Need Medications Sent to Pharmacy  []  Other     SUMMARY: Per protocol advised ER now.   Daughter-in-law stated

## 2020-08-04 NOTE — TELEPHONE ENCOUNTER
Spoke with daughter in law who states pt went to [facility] and was admitted. Per daughter in law the providers are trying to figure out pt GI issues and pt was also told she has irregular heart rate.     Advised HFU when pt discharged and pt daugh

## 2020-08-11 ENCOUNTER — OFFICE VISIT (OUTPATIENT)
Dept: INTERNAL MEDICINE CLINIC | Facility: CLINIC | Age: 85
End: 2020-08-11
Payer: MEDICARE

## 2020-08-11 ENCOUNTER — TELEPHONE (OUTPATIENT)
Dept: CARDIOLOGY CLINIC | Facility: CLINIC | Age: 85
End: 2020-08-11

## 2020-08-11 VITALS
BODY MASS INDEX: 29.64 KG/M2 | DIASTOLIC BLOOD PRESSURE: 56 MMHG | TEMPERATURE: 99 F | HEIGHT: 59 IN | SYSTOLIC BLOOD PRESSURE: 105 MMHG | WEIGHT: 147 LBS | HEART RATE: 70 BPM

## 2020-08-11 DIAGNOSIS — K44.9 HIATAL HERNIA: ICD-10-CM

## 2020-08-11 DIAGNOSIS — I48.0 PAROXYSMAL ATRIAL FIBRILLATION (HCC): ICD-10-CM

## 2020-08-11 DIAGNOSIS — M19.90 ARTHRITIS: ICD-10-CM

## 2020-08-11 DIAGNOSIS — K21.9 GASTRIC REFLUX: Primary | ICD-10-CM

## 2020-08-11 DIAGNOSIS — D64.9 ANEMIA, UNSPECIFIED TYPE: ICD-10-CM

## 2020-08-11 DIAGNOSIS — B37.0 ORAL THRUSH: ICD-10-CM

## 2020-08-11 PROCEDURE — 1111F DSCHRG MED/CURRENT MED MERGE: CPT | Performed by: INTERNAL MEDICINE

## 2020-08-11 PROCEDURE — 99203 OFFICE O/P NEW LOW 30 MIN: CPT | Performed by: INTERNAL MEDICINE

## 2020-08-11 PROCEDURE — G0463 HOSPITAL OUTPT CLINIC VISIT: HCPCS | Performed by: INTERNAL MEDICINE

## 2020-08-11 RX ORDER — METOPROLOL TARTRATE 50 MG/1
50 TABLET, FILM COATED ORAL 2 TIMES DAILY
COMMUNITY
Start: 2020-08-08 | End: 2020-09-15

## 2020-08-11 RX ORDER — OMEPRAZOLE 40 MG/1
40 CAPSULE, DELAYED RELEASE ORAL NIGHTLY
Qty: 90 CAPSULE | Refills: 5 | Status: SHIPPED | OUTPATIENT
Start: 2020-08-11 | End: 2020-01-01

## 2020-08-11 RX ORDER — RIVAROXABAN 15 MG/1
15 TABLET, FILM COATED ORAL DAILY
COMMUNITY
Start: 2020-08-07 | End: 2020-09-15

## 2020-08-11 RX ORDER — TRAMADOL HYDROCHLORIDE 50 MG/1
50 TABLET ORAL EVERY 8 HOURS PRN
Qty: 30 TABLET | Refills: 0 | Status: SHIPPED | OUTPATIENT
Start: 2020-08-11 | End: 2021-01-01

## 2020-08-11 RX ORDER — FERROUS SULFATE 325(65) MG
325 TABLET ORAL
Qty: 45 TABLET | Refills: 1 | Status: SHIPPED | OUTPATIENT
Start: 2020-08-11 | End: 2020-01-01

## 2020-08-11 NOTE — PROGRESS NOTES
History of Present Illness   Patient ID: Marlee Brewer is a 81year old female.   Chief Complaint: Hospital F/U and Abdominal Pain      HPI   Very pleasant 61-year-old female with a history of COPD, atrial fibrillation not on anticoagulation due to frequ Gastrointestinal: Negative for abdominal pain and blood in stool. Endocrine: Negative for polyuria. Genitourinary: Negative for hematuria. Neurological: Negative for syncope. Psychiatric/Behavioral: Negative for sleep disturbance.           Physical Component Value Date    WBC 4.9 02/08/2019    RBC 4.23 02/08/2019    HGB 12.8 02/08/2019    HCT 40.4 02/08/2019    MCV 95.5 02/08/2019    MCH 30.3 02/08/2019    MCHC 31.7 02/08/2019    RDW 13.2 02/08/2019    .0 02/08/2019    MPV 8.7 10/19/2018     L • Heart Disease Father         PREMATURE CAD   • Alcohol and Other Disorders Associated Father         ALCOHOLISM       Reviewed Social History:  Social History    Tobacco Use      Smoking status: Former Smoker        Packs/day: 1.50        Years: 10.00 • budesonide 0.5 MG/2ML Inhalation Suspension Take 2 mL (0.5 mg total) by nebulization 2 (two) times daily.  Bill medicare part b - J44.9 60 ampule 5   • KLOR-CON M10 10 MEQ Oral Tab CR TAKE 1 TABLET BY MOUTH DAILY 90 tablet 11   • aspirin 81 MG Oral Tab Ta Overall doing well since discharge. I have asked that they request records from [facility] for review, I am not entirely sure what occurred in the hospital setting and history of limited/fragmented.   Recommend she continue with current meds except we will mo · Feeling full quickly  · Loss of appetite  · Nausea or vomiting  · Vomiting blood or having black stools  · Feeling more tired than usual  An inflamed and irritated stomach lining is more likely to develop a sore called an ulcer.  To help prevent this, gas If you often have a painful burning feeling in your chest after you eat, you may have gastroesophageal reflux disease (GERD). Heartburn that keeps coming back is a classic symptom of GERD. But you may have other symptoms as well.  A GERD diagnosis is made o When you have GERD, stomach acid feels as if it’s backing up toward your mouth. Whether or not you take medicine to control your GERD, your symptoms can often be improved with lifestyle changes.  Talk to your healthcare provider about the following suggesti Gastroesophageal reflux disease (GERD) can be treated with medicine. This may be done with a medicine you can buy over the counter. Or it may be done with a medicine that your healthcare provider has to prescribe. In some cases, both types may be used.  You Side effects: Tiredness, depression, anxiety, problems with physical movement, belly cramps, constipation, diarrhea, a jittery feeling  Medicines to avoid  Don’t take aspirin without your provider’s approval. And don’t take a nonsteroidal anti-inflammatory · breathing problems  · chest pain or chest tightness  · dark yellow or brown urine  · diarrhea  · dizziness  · fast, irregular heartbeat  · feeling faint or lightheaded  · fever or sore throat  · muscle spasm  · palpitations  · rash on cheeks or arms that · have heartburn with dizziness, lightheadedness or sweating  · liver disease  · lupus  · stomach pain  · unexplained weight loss  · vomiting with blood  · wheezing  · an unusual or allergic reaction to omeprazole, other medicines, foods, dyes, or preserva

## 2020-08-11 NOTE — TELEPHONE ENCOUNTER
Pt requesting 3 week hospital follow up appt with Dr. Severa Res.  Pt states she would like to see Dr. Severa Res to discuss what happened in the hospital. Please call [phone]

## 2020-08-11 NOTE — PATIENT INSTRUCTIONS
Gastritis (Adult)    Gastritis is inflammation and irritation of the stomach lining. You can have it for a short time (acute) or be long lasting (chronic). Infection with bacteria called H pylori most often causes gastritis.  More than a third of people i Follow up with your healthcare provider, or as advised by our staff. You may need testing to check for inflammation or an ulcer.   When to seek medical advice  Call your healthcare provider for any of the following:  · Stomach pain that gets worse or moves · Upset stomach (nausea) or vomiting  Relieving your discomfort  You and your healthcare provider can work together to find the treatment options that best ease your symptoms. These may include lifestyle changes, medicine, and possibly surgery.   Many peopl · Fatty, fried, or spicy food  · Mint, chocolate, onions, and tomatoes  · Peppermint  · Any other foods that seem to irritate your stomach or cause you pain  Relieve the pressure  Tips include the following:  · Eat smaller meals, even if you have to eat mo These also cause the stomach to make less acid. They reduce stomach acid more than H-2 blockers. They may be used for a short time, or longer to treat certain conditions. You can buy some of them over the counter. Or your provider may prescribe them.  They Take this medicine by mouth. Follow the directions on the product label. If you are taking this medicine without a prescription, take one tablet every day.  Do not use for longer than 14 days or repeat a course of treatment more often than every 4 months un · nelfinavir  This medicine may also interact with the following medications:  · ampicillin  · certain medicines for anxiety or sleep  · certain medicines that treat or prevent blood clots like warfarin  · cyclosporine  · diazepam  · digoxin  · disulfiram Do not treat yourself for heartburn with this medicine for more than 14 days in a row. You should only use this medicine for a 2-week treatment period once every 4 months.  If your symptoms return shortly after your therapy is complete, or within the 4 jenifer

## 2020-08-12 NOTE — TELEPHONE ENCOUNTER
Spoke with Tobias Jin went to [facility] due to hiatal hernia and wants to schedule follow up. Advised 1st available with Dr. Howard Cox 9/22, offered sooner with [facility] (8/14) who usually sees the hospital follow ups.  She declined and did not feel 9/15 was t

## 2020-08-21 ENCOUNTER — LAB ENCOUNTER (OUTPATIENT)
Dept: LAB | Age: 85
End: 2020-08-21
Attending: INTERNAL MEDICINE
Payer: MEDICARE

## 2020-08-21 DIAGNOSIS — D64.9 ANEMIA, UNSPECIFIED TYPE: ICD-10-CM

## 2020-08-21 DIAGNOSIS — E78.00 HIGH CHOLESTEROL: ICD-10-CM

## 2020-08-21 DIAGNOSIS — I50.33 ACUTE ON CHRONIC DIASTOLIC HEART FAILURE (HCC): Primary | ICD-10-CM

## 2020-08-21 DIAGNOSIS — I48.91 ATRIAL FIBRILLATION, UNSPECIFIED TYPE (HCC): ICD-10-CM

## 2020-08-21 DIAGNOSIS — I42.9 CARDIOMYOPATHY, UNSPECIFIED TYPE (HCC): ICD-10-CM

## 2020-08-21 LAB
ALBUMIN SERPL-MCNC: 3.7 G/DL (ref 3.4–5)
ALBUMIN/GLOB SERPL: 1.1 {RATIO} (ref 1–2)
ALP LIVER SERPL-CCNC: 84 U/L (ref 55–142)
ALT SERPL-CCNC: 29 U/L (ref 13–56)
ANION GAP SERPL CALC-SCNC: 7 MMOL/L (ref 0–18)
AST SERPL-CCNC: 18 U/L (ref 15–37)
BASOPHILS # BLD AUTO: 0.05 X10(3) UL (ref 0–0.2)
BASOPHILS NFR BLD AUTO: 0.7 %
BILIRUB SERPL-MCNC: 0.5 MG/DL (ref 0.1–2)
BUN BLD-MCNC: 26 MG/DL (ref 7–18)
BUN/CREAT SERPL: 18.2 (ref 10–20)
CALCIUM BLD-MCNC: 9.6 MG/DL (ref 8.5–10.1)
CHLORIDE SERPL-SCNC: 104 MMOL/L (ref 98–112)
CHOLEST SMN-MCNC: 144 MG/DL (ref ?–200)
CO2 SERPL-SCNC: 31 MMOL/L (ref 21–32)
CREAT BLD-MCNC: 1.43 MG/DL (ref 0.55–1.02)
DEPRECATED HBV CORE AB SER IA-ACNC: 313.8 NG/ML (ref 18–340)
DEPRECATED RDW RBC AUTO: 51 FL (ref 35.1–46.3)
EOSINOPHIL # BLD AUTO: 0.19 X10(3) UL (ref 0–0.7)
EOSINOPHIL NFR BLD AUTO: 2.5 %
ERYTHROCYTE [DISTWIDTH] IN BLOOD BY AUTOMATED COUNT: 13.9 % (ref 11–15)
GLOBULIN PLAS-MCNC: 3.4 G/DL (ref 2.8–4.4)
GLUCOSE BLD-MCNC: 78 MG/DL (ref 70–99)
HCT VFR BLD AUTO: 40.6 % (ref 35–48)
HDLC SERPL-MCNC: 68 MG/DL (ref 40–59)
HGB BLD-MCNC: 12.9 G/DL (ref 12–16)
HGB RETIC QN AUTO: 33.9 PG (ref 28.2–36.6)
IMM GRANULOCYTES # BLD AUTO: 0.02 X10(3) UL (ref 0–1)
IMM GRANULOCYTES NFR BLD: 0.3 %
IMM RETICS NFR: 0.06 RATIO (ref 0.1–0.3)
IRON SATURATION: 21 % (ref 15–50)
IRON SERPL-MCNC: 82 UG/DL (ref 50–170)
LDLC SERPL CALC-MCNC: 57 MG/DL (ref ?–100)
LYMPHOCYTES # BLD AUTO: 0.97 X10(3) UL (ref 1–4)
LYMPHOCYTES NFR BLD AUTO: 12.9 %
M PROTEIN MFR SERPL ELPH: 7.1 G/DL (ref 6.4–8.2)
MCH RBC QN AUTO: 31.5 PG (ref 26–34)
MCHC RBC AUTO-ENTMCNC: 31.8 G/DL (ref 31–37)
MCV RBC AUTO: 99.3 FL (ref 80–100)
MONOCYTES # BLD AUTO: 0.63 X10(3) UL (ref 0.1–1)
MONOCYTES NFR BLD AUTO: 8.4 %
NEUTROPHILS # BLD AUTO: 5.64 X10 (3) UL (ref 1.5–7.7)
NEUTROPHILS # BLD AUTO: 5.64 X10(3) UL (ref 1.5–7.7)
NEUTROPHILS NFR BLD AUTO: 75.2 %
NONHDLC SERPL-MCNC: 76 MG/DL (ref ?–130)
OSMOLALITY SERPL CALC.SUM OF ELEC: 298 MOSM/KG (ref 275–295)
PATIENT FASTING Y/N/NP: YES
PATIENT FASTING Y/N/NP: YES
PLATELET # BLD AUTO: 336 10(3)UL (ref 150–450)
POTASSIUM SERPL-SCNC: 4.6 MMOL/L (ref 3.5–5.1)
RBC # BLD AUTO: 4.09 X10(6)UL (ref 3.8–5.3)
RETICS # AUTO: 48.6 X10(3) UL (ref 22.5–147.5)
RETICS/RBC NFR AUTO: 1.2 % (ref 0.5–2.5)
SODIUM SERPL-SCNC: 142 MMOL/L (ref 136–145)
TOTAL IRON BINDING CAPACITY: 399 UG/DL (ref 240–450)
TRANSFERRIN SERPL-MCNC: 268 MG/DL (ref 200–360)
TRIGL SERPL-MCNC: 95 MG/DL (ref 30–149)
VIT B12 SERPL-MCNC: 860 PG/ML (ref 193–986)
VLDLC SERPL CALC-MCNC: 19 MG/DL (ref 0–30)
WBC # BLD AUTO: 7.5 X10(3) UL (ref 4–11)

## 2020-08-21 PROCEDURE — 84466 ASSAY OF TRANSFERRIN: CPT

## 2020-08-21 PROCEDURE — 82607 VITAMIN B-12: CPT

## 2020-08-21 PROCEDURE — 85025 COMPLETE CBC W/AUTO DIFF WBC: CPT

## 2020-08-21 PROCEDURE — 85045 AUTOMATED RETICULOCYTE COUNT: CPT

## 2020-08-21 PROCEDURE — 85060 BLOOD SMEAR INTERPRETATION: CPT

## 2020-08-21 PROCEDURE — 36415 COLL VENOUS BLD VENIPUNCTURE: CPT

## 2020-08-21 PROCEDURE — 80053 COMPREHEN METABOLIC PANEL: CPT

## 2020-08-21 PROCEDURE — 83540 ASSAY OF IRON: CPT

## 2020-08-21 PROCEDURE — 80061 LIPID PANEL: CPT

## 2020-08-21 PROCEDURE — 82728 ASSAY OF FERRITIN: CPT

## 2020-08-25 ENCOUNTER — TELEPHONE (OUTPATIENT)
Dept: INTERNAL MEDICINE CLINIC | Facility: CLINIC | Age: 85
End: 2020-08-25

## 2020-08-25 NOTE — TELEPHONE ENCOUNTER
[facility]clean [facility] [place] [facility] would like recent results of [facility] labs faxed over to [address] [facility] at [phone].  Please advise

## 2020-08-27 ENCOUNTER — TELEPHONE (OUTPATIENT)
Dept: CARDIOLOGY CLINIC | Facility: CLINIC | Age: 85
End: 2020-08-27

## 2020-08-27 DIAGNOSIS — I48.91 ATRIAL FIBRILLATION, UNSPECIFIED TYPE (HCC): Primary | ICD-10-CM

## 2020-08-27 DIAGNOSIS — E78.00 HIGH CHOLESTEROL: ICD-10-CM

## 2020-08-27 NOTE — TELEPHONE ENCOUNTER
DELGADO Redman [facility] Clinical Staff             Lipids are stable, continue present management. Recheck in 1 yr.

## 2020-09-01 ENCOUNTER — TELEPHONE (OUTPATIENT)
Dept: INTERNAL MEDICINE CLINIC | Facility: CLINIC | Age: 85
End: 2020-09-01

## 2020-09-01 NOTE — TELEPHONE ENCOUNTER
Tammie from [facility] states patient requested to be discharged from [facility].  Will be discharged today

## 2020-09-09 ENCOUNTER — HOSPITAL ENCOUNTER (OUTPATIENT)
Dept: CV DIAGNOSTICS | Age: 85
Discharge: HOME | End: 2020-09-09
Attending: INTERNAL MEDICINE
Payer: MEDICARE

## 2020-09-09 DIAGNOSIS — E78.00 HIGH CHOLESTEROL: ICD-10-CM

## 2020-09-09 DIAGNOSIS — R60.9 EDEMA, UNSPECIFIED TYPE: ICD-10-CM

## 2020-09-09 DIAGNOSIS — I42.9 CARDIOMYOPATHY, UNSPECIFIED TYPE (HCC): ICD-10-CM

## 2020-09-09 DIAGNOSIS — I48.91 ATRIAL FIBRILLATION, UNSPECIFIED TYPE (HCC): ICD-10-CM

## 2020-09-09 PROCEDURE — 93306 TTE W/DOPPLER COMPLETE: CPT | Performed by: INTERNAL MEDICINE

## 2020-09-14 NOTE — TELEPHONE ENCOUNTER
Patient in office trying to get labs ordered by Dr. Yuan Torres, no labs on file. Patient stated she never received a call in regards to canceling today's appointment. Patient was supposed to discuss what medications to take today.  Patient refusing to see melanie

## 2020-09-15 ENCOUNTER — TELEPHONE (OUTPATIENT)
Dept: CARDIOLOGY CLINIC | Facility: CLINIC | Age: 85
End: 2020-09-15

## 2020-09-15 ENCOUNTER — OFFICE VISIT (OUTPATIENT)
Dept: CARDIOLOGY CLINIC | Facility: CLINIC | Age: 85
End: 2020-09-15
Payer: MEDICARE

## 2020-09-15 VITALS
SYSTOLIC BLOOD PRESSURE: 99 MMHG | HEIGHT: 59 IN | RESPIRATION RATE: 18 BRPM | WEIGHT: 120 LBS | BODY MASS INDEX: 24.19 KG/M2 | HEART RATE: 112 BPM | DIASTOLIC BLOOD PRESSURE: 72 MMHG

## 2020-09-15 DIAGNOSIS — I34.0 NONRHEUMATIC MITRAL VALVE REGURGITATION: Primary | ICD-10-CM

## 2020-09-15 DIAGNOSIS — I48.91 ATRIAL FIBRILLATION, UNSPECIFIED TYPE (HCC): ICD-10-CM

## 2020-09-15 DIAGNOSIS — I42.9 CARDIOMYOPATHY, UNSPECIFIED TYPE (HCC): ICD-10-CM

## 2020-09-15 DIAGNOSIS — E78.00 HIGH CHOLESTEROL: ICD-10-CM

## 2020-09-15 PROCEDURE — G0463 HOSPITAL OUTPT CLINIC VISIT: HCPCS | Performed by: INTERNAL MEDICINE

## 2020-09-15 PROCEDURE — 99215 OFFICE O/P EST HI 40 MIN: CPT | Performed by: INTERNAL MEDICINE

## 2020-09-15 RX ORDER — PRAVASTATIN SODIUM 20 MG
TABLET ORAL
Qty: 90 TABLET | Refills: 3 | Status: SHIPPED | OUTPATIENT
Start: 2020-09-15 | End: 2020-01-01

## 2020-09-15 RX ORDER — FUROSEMIDE 80 MG
40 TABLET ORAL DAILY
COMMUNITY
Start: 2020-08-14 | End: 2020-01-01

## 2020-09-15 RX ORDER — METOPROLOL TARTRATE 50 MG/1
50 TABLET, FILM COATED ORAL 2 TIMES DAILY
Qty: 180 TABLET | Refills: 3 | Status: SHIPPED | OUTPATIENT
Start: 2020-09-15 | End: 2020-09-25 | Stop reason: ALTCHOICE

## 2020-09-15 RX ORDER — RIVAROXABAN 15 MG/1
15 TABLET, FILM COATED ORAL DAILY
Qty: 30 TABLET | Refills: 11 | Status: SHIPPED | OUTPATIENT
Start: 2020-09-15 | End: 2020-01-01

## 2020-09-15 NOTE — TELEPHONE ENCOUNTER
Patient called in stating that the pharmacy is not sure which medication to fill they received two different dosages for medication Metoprolol Tartrate. Patient is a little upset because she has been without the medication for 2 weeks now. She would like to have medicine today.  Please advise thank you

## 2020-09-15 NOTE — PROGRESS NOTES
[place] CLINIC  PROGRESS NOTE    Hari Chauhan is a 81year old female. Patient presents with: Follow - Up  Atrial Fibrillation    HPI:   This is a pleasant 81year old female with asthma prior A. fib cardiomyopathy who presents for follow-up.   Patient Take 1 tablet (325 mg total) by mouth every other day. 45 tablet 1   • spironolactone 25 MG Oral Tab Take 1 tablet (25 mg total) by mouth daily. (Patient taking differently: Take 50 mg by mouth daily.  2 - 25mg  tablet daily ) 90 tablet 0   • BROVANA 15 MCG Alcohol use: No      Comment: rarely    Drug use: No    Family History  Family History   Problem Relation Age of Onset   • Cancer Other         APPENDIX, NEPHEW, Gallatinfederica Pardo, NEPHEW   • Hypertension Mother    • Heart Disease Mother [de-identified]        CAD, CAUSE OF DE who now has persistent atrial fibrillation and worsening mitral regurgitation with mild dyspnea. At this time her heart rates are elevated since she is not on metoprolol which we will resume starting at 12.5 mg twice a day titrating dose as needed.   Since

## 2020-09-15 NOTE — PATIENT INSTRUCTIONS
Start metoprolol 12.5 mg twice a day    Monitor record blood pressure and pulse over the next week and call with results    Decrease furosemide to 40 mg daily    Get follow-up BMP when seeing [facility] in 1 week    Monitor daily weights and call if gained 3 poun

## 2020-09-15 NOTE — TELEPHONE ENCOUNTER
paperwork for [employer] PA[employer] started. Our portion completed and signed, given to pat and son to complete her and fax in or return to us to fax.

## 2020-09-17 ENCOUNTER — TELEPHONE (OUTPATIENT)
Dept: INTERNAL MEDICINE CLINIC | Facility: CLINIC | Age: 85
End: 2020-09-17

## 2020-09-17 RX ORDER — RIVAROXABAN 15 MG/1
15 TABLET, FILM COATED ORAL DAILY
Refills: 0 | OUTPATIENT
Start: 2020-09-17

## 2020-09-17 RX ORDER — FUROSEMIDE 20 MG/1
TABLET ORAL
Qty: 180 TABLET | Refills: 1 | OUTPATIENT
Start: 2020-09-17

## 2020-09-17 RX ORDER — METOPROLOL TARTRATE 50 MG/1
50 TABLET, FILM COATED ORAL 2 TIMES DAILY
Refills: 0 | OUTPATIENT
Start: 2020-09-17

## 2020-09-17 NOTE — TELEPHONE ENCOUNTER
Tita from [facility] states they faxed over an order for patients discharge on 9/3,9/10 and 9/14.  Asking for it to be signed and faxed back

## 2020-09-18 ENCOUNTER — HOSPITAL ENCOUNTER (OUTPATIENT)
Age: 85
Discharge: HOME | End: 2020-09-18
Attending: EMERGENCY MEDICINE
Payer: MEDICARE

## 2020-09-18 ENCOUNTER — NURSE TRIAGE (OUTPATIENT)
Dept: INTERNAL MEDICINE CLINIC | Facility: CLINIC | Age: 85
End: 2020-09-18

## 2020-09-18 VITALS
HEART RATE: 84 BPM | OXYGEN SATURATION: 99 % | TEMPERATURE: 99 F | SYSTOLIC BLOOD PRESSURE: 128 MMHG | BODY MASS INDEX: 22.63 KG/M2 | WEIGHT: 123 LBS | HEIGHT: 62 IN | DIASTOLIC BLOOD PRESSURE: 76 MMHG | RESPIRATION RATE: 14 BRPM

## 2020-09-18 DIAGNOSIS — S51.812A SKIN TEAR OF FOREARM WITHOUT COMPLICATION, LEFT, INITIAL ENCOUNTER: Primary | ICD-10-CM

## 2020-09-18 PROCEDURE — 12001 RPR S/N/AX/GEN/TRNK 2.5CM/<: CPT | Performed by: EMERGENCY MEDICINE

## 2020-09-18 PROCEDURE — 99213 OFFICE O/P EST LOW 20 MIN: CPT | Performed by: EMERGENCY MEDICINE

## 2020-09-18 NOTE — ED PROVIDER NOTES
Patient Seen in: [facility] In [address] [place] [address]      History   Patient presents with:  Laceration Abrasion    Stated Complaint: cut lt forearm/cant stop bleeding/on blood thinners    HPI  Patient hit the edge of her left forearm on the micr Constitutional and vital signs reviewed. All other systems reviewed and negative except as noted above.     Physical Exam     ED Triage Vitals [09/18/20 1206]   /76   Pulse 84   Resp 14   Temp 99.4 °F (37.4 °C)   Temp src Temporal   SpO2 99 %   O

## 2020-09-18 NOTE — TELEPHONE ENCOUNTER
Action Requested: Summary for Provider     []  Critical Lab, Recommendations Needed  [] Need Additional Advice  [x]   FYI    []   Need Orders  [] Need Medications Sent to Pharmacy  []  Other     SUMMARY: Patient stated that yesterday she was reaching into

## 2020-09-18 NOTE — TELEPHONE ENCOUNTER
Second call from [place], [place] Nurse with [facility] requesting discharge order to be signed by Dr. Padmini Mancilla. Advised Dr. Padmini Mancilla is out of the office; return date unknown to this nurse. Tita asking if another doctor, who has seen this patient sign the order.  I chec

## 2020-09-19 NOTE — TELEPHONE ENCOUNTER
Patient went to urgent care yesterday.     Disposition and Plan      Clinical Impression:  Skin tear of forearm without complication, left, initial encounter  (primary encounter diagnosis)     Disposition:  Discharge  9/18/2020  1:01 pm     Follow-up:  Patricia Lyles

## 2020-09-24 ENCOUNTER — TELEPHONE (OUTPATIENT)
Dept: CARDIOLOGY CLINIC | Facility: CLINIC | Age: 85
End: 2020-09-24

## 2020-09-24 NOTE — TELEPHONE ENCOUNTER
LMTCB (Pamela stearns) to do pre office visit travel screen for [facility]. Number listed for [facility] was answered by someone named Endy Fire, no Mira there. Please confirm visit and do travel screen if they call back.  Thank you

## 2020-09-25 ENCOUNTER — TELEPHONE (OUTPATIENT)
Dept: INTERNAL MEDICINE CLINIC | Facility: CLINIC | Age: 85
End: 2020-09-25

## 2020-09-25 ENCOUNTER — OFFICE VISIT (OUTPATIENT)
Dept: CARDIOLOGY CLINIC | Facility: CLINIC | Age: 85
End: 2020-09-25
Payer: MEDICARE

## 2020-09-25 VITALS
BODY MASS INDEX: 24.27 KG/M2 | SYSTOLIC BLOOD PRESSURE: 110 MMHG | DIASTOLIC BLOOD PRESSURE: 65 MMHG | HEIGHT: 63 IN | WEIGHT: 137 LBS | HEART RATE: 70 BPM | OXYGEN SATURATION: 97 %

## 2020-09-25 DIAGNOSIS — I48.0 PAROXYSMAL ATRIAL FIBRILLATION (HCC): Primary | ICD-10-CM

## 2020-09-25 PROCEDURE — G0463 HOSPITAL OUTPT CLINIC VISIT: HCPCS | Performed by: NURSE PRACTITIONER

## 2020-09-25 PROCEDURE — 99214 OFFICE O/P EST MOD 30 MIN: CPT | Performed by: NURSE PRACTITIONER

## 2020-09-25 NOTE — TELEPHONE ENCOUNTER
Order # [id number] faxed to [address]. Fax confirmation rec'd. Original placed for scanning to chart.

## 2020-09-25 NOTE — PROGRESS NOTES
James Redman is a 81year old female. Patient presents with: Follow - Up: 1 week F/U pt of Dr. Jones Abad. Hx of prior paroxysmal a fib, cardiomyopathy, asthma. Has worsening afib and mitral regurg.  Needs eval, if stabl plan to proceed with transesophage MG Oral Capsule Delayed Release Take 1 capsule (40 mg total) by mouth nightly. take at bedtime 90 capsule 5   • Ferrous Sulfate 325 (65 Fe) MG Oral Tab Take 1 tablet (325 mg total) by mouth every other day.  45 tablet 1   • spironolactone 25 MG Oral Tab [facility] Unspecified essential hypertension    • Unspecified sleep apnea       Social History:  Social History    Tobacco Use      Smoking status: Former Smoker        Packs/day: 1.50        Years: 10.00        Pack years: 15        Types: Cigarettes        Start d to return in 1 month.       DELGADO Martin  9/25/2020  9:27 AM

## 2020-09-28 ENCOUNTER — TELEPHONE (OUTPATIENT)
Dept: CARDIOLOGY CLINIC | Facility: CLINIC | Age: 85
End: 2020-09-28

## 2020-09-29 ENCOUNTER — APPOINTMENT (OUTPATIENT)
Dept: LAB | Age: 85
End: 2020-09-29
Attending: INTERNAL MEDICINE
Payer: MEDICARE

## 2020-09-29 DIAGNOSIS — Z01.818 PREOP TESTING: ICD-10-CM

## 2020-10-02 ENCOUNTER — HOSPITAL ENCOUNTER (OUTPATIENT)
Dept: INTERVENTIONAL RADIOLOGY/VASCULAR | Facility: HOSPITAL | Age: 85
Discharge: HOME | End: 2020-10-02
Attending: INTERNAL MEDICINE | Admitting: INTERNAL MEDICINE
Payer: MEDICARE

## 2020-10-02 ENCOUNTER — HOSPITAL ENCOUNTER (OUTPATIENT)
Dept: CV DIAGNOSTICS | Facility: HOSPITAL | Age: 85
Discharge: HOME | End: 2020-10-02
Attending: INTERNAL MEDICINE
Payer: MEDICARE

## 2020-10-02 VITALS
SYSTOLIC BLOOD PRESSURE: 109 MMHG | RESPIRATION RATE: 20 BRPM | BODY MASS INDEX: 22.15 KG/M2 | HEART RATE: 90 BPM | OXYGEN SATURATION: 98 % | DIASTOLIC BLOOD PRESSURE: 56 MMHG | HEIGHT: 63 IN | WEIGHT: 125 LBS

## 2020-10-02 DIAGNOSIS — I34.0 MR (MITRAL REGURGITATION): ICD-10-CM

## 2020-10-02 DIAGNOSIS — Z01.818 PREOP TESTING: Primary | ICD-10-CM

## 2020-10-02 PROCEDURE — 93312 ECHO TRANSESOPHAGEAL: CPT | Performed by: STUDENT IN AN ORGANIZED HEALTH CARE EDUCATION/TRAINING PROGRAM

## 2020-10-02 PROCEDURE — 93312 ECHO TRANSESOPHAGEAL: CPT

## 2020-10-02 PROCEDURE — 93320 DOPPLER ECHO COMPLETE: CPT | Performed by: INTERNAL MEDICINE

## 2020-10-02 PROCEDURE — 93325 DOPPLER ECHO COLOR FLOW MAPG: CPT | Performed by: INTERNAL MEDICINE

## 2020-10-02 PROCEDURE — 93320 DOPPLER ECHO COMPLETE: CPT | Performed by: STUDENT IN AN ORGANIZED HEALTH CARE EDUCATION/TRAINING PROGRAM

## 2020-10-02 PROCEDURE — 93325 DOPPLER ECHO COLOR FLOW MAPG: CPT | Performed by: STUDENT IN AN ORGANIZED HEALTH CARE EDUCATION/TRAINING PROGRAM

## 2020-10-02 PROCEDURE — 99153 MOD SED SAME PHYS/QHP EA: CPT

## 2020-10-02 PROCEDURE — 36415 COLL VENOUS BLD VENIPUNCTURE: CPT

## 2020-10-02 PROCEDURE — 99152 MOD SED SAME PHYS/QHP 5/>YRS: CPT

## 2020-10-02 RX ORDER — LIDOCAINE HYDROCHLORIDE 20 MG/ML
SOLUTION OROPHARYNGEAL
Status: DISCONTINUED
Start: 2020-10-02 | End: 2020-10-02 | Stop reason: WASHOUT

## 2020-10-02 RX ORDER — SODIUM CHLORIDE 9 MG/ML
INJECTION, SOLUTION INTRAVENOUS CONTINUOUS
Status: DISCONTINUED | OUTPATIENT
Start: 2020-10-02 | End: 2020-10-02

## 2020-10-02 RX ORDER — MIDAZOLAM HYDROCHLORIDE 1 MG/ML
1 INJECTION INTRAMUSCULAR; INTRAVENOUS EVERY 5 MIN PRN
Status: DISCONTINUED | OUTPATIENT
Start: 2020-10-02 | End: 2020-10-02

## 2020-10-02 RX ORDER — MIDAZOLAM HYDROCHLORIDE 1 MG/ML
INJECTION INTRAMUSCULAR; INTRAVENOUS
Status: DISCONTINUED
Start: 2020-10-02 | End: 2020-10-02

## 2020-10-02 RX ADMIN — MIDAZOLAM HYDROCHLORIDE 5 MG: 1 INJECTION INTRAMUSCULAR; INTRAVENOUS at 10:30:00

## 2020-10-02 RX ADMIN — SODIUM CHLORIDE: 9 INJECTION, SOLUTION INTRAVENOUS at 10:00:00

## 2020-10-02 NOTE — IVS NOTE
DISCHARGE NOTE     Pt is able to sit up and pivot to wheelchair    Pt tolerated fluids and food. Instruction provided, patient/family verbalizes understanding. Dr. Mercedes Pace spoke with patient/family post procedure.      Pt discharge via wheelchair to [facility]

## 2020-10-05 ENCOUNTER — TELEPHONE (OUTPATIENT)
Dept: CARDIOLOGY CLINIC | Facility: CLINIC | Age: 85
End: 2020-10-05

## 2020-10-05 NOTE — TELEPHONE ENCOUNTER
Patient calling for EDUARDO results. Will review results with Dr. Sarah Colindres in office 10/6, then call patient back.

## 2020-10-06 NOTE — TELEPHONE ENCOUNTER
Reviewed with MB, and states that the EDUARDO was limited quality but was not as bad. He  recommends that patient see how she feels and see him in a month. S/w crescencio and relayed test results and f/u. appt made for 11/14. To call if anything changes.

## 2020-10-21 ENCOUNTER — MED REC SCAN ONLY (OUTPATIENT)
Dept: INTERNAL MEDICINE CLINIC | Facility: CLINIC | Age: 85
End: 2020-10-21

## 2020-10-30 NOTE — TELEPHONE ENCOUNTER
Pt reports  has a lot of belching x 2 hours since eating macaroni and cheese. Denies abdominal pain, nausea, fever, other symptoms. Pt could be heard belching during call. Wanting appt today but no openings left.  Pt states will go to [facility] as has gone

## 2020-10-30 NOTE — ED INITIAL ASSESSMENT (HPI)
Pt reports some nausea and excessive belching after eating. Pt would like to know if there is a medication she can take for acid reflux that does not interfere with her other medications.

## 2020-10-30 NOTE — ED PROVIDER NOTES
Patient Seen in: [facility]      History   Patient presents with:  Nausea    Stated Complaint: Leitha Colon    HPI  She is here with her son. Patient complains of nausea and belching that comes and episodes that can last up to 3 hours.   Since Augu tobacco: Never Used    Alcohol use: No      Comment: rarely    Drug use: No             Review of Systems    Positive for stated complaint: NASEAU  Other systems are as noted in HPI. Constitutional and vital signs reviewed.       All other systems reviewed Behavior normal.         ED Course   Labs Reviewed - No data to display  EKG    Rate, intervals and axes as noted on EKG Report.   Rate: 90  Rhythm: Atrial Fibrillation  Reading: No acute changes            MDM      I discussed with patient and her son she

## 2020-11-02 PROBLEM — K21.9 HIATAL HERNIA WITH GERD: Status: ACTIVE | Noted: 2020-01-01

## 2020-11-02 PROBLEM — K44.9 HIATAL HERNIA WITH GERD: Status: ACTIVE | Noted: 2020-01-01

## 2020-11-02 PROBLEM — K86.3 PANCREATIC PSEUDOCYST: Status: ACTIVE | Noted: 2020-01-01

## 2020-11-02 PROBLEM — K22.70 BARRETT'S ESOPHAGUS WITHOUT DYSPLASIA: Status: ACTIVE | Noted: 2020-01-01

## 2020-11-02 NOTE — TELEPHONE ENCOUNTER
Please offer doximity/phone visit to discuss belching. Thanks. ED note reviewed from yesterday, If she has any new symptoms/chest pains. Needs to go to ER right away.

## 2020-11-02 NOTE — PROGRESS NOTES
Telehealth outside of [address] Verbal Consent     I conducted a telehealth visit with Triny Laura today, 11/02/20, which was completed using two-way, real-time interactive audio and video communication.  This has been done in good oral to namita satiety, heartburn and nausea. She reports no abdominal pain. This is a chronic problem. The current episode started more than 1 year ago. The problem occurs constantly. The problem has been gradually worsening. The heartburn is located in the substernum. time.   Skin: Skin is intact. Psychiatric: She has a normal mood and affect. Her speech is normal and behavior is normal.       Assessment & Plan    1. Hiatal hernia with GERD  - GASTRO - INTERNAL  - Omeprazole 40 MG Oral Capsule Delayed Release;  Take 1 08/21/2020    TP 7.1 08/21/2020    ALB 3.7 08/21/2020    GLOBULIN 3.4 08/21/2020    AGRATIO 1.5 07/02/2015     08/21/2020    K 4.6 08/21/2020     08/21/2020    CO2 31.0 08/21/2020     No results found for: EAG, A1C  Lab Results   Component Valu HIGH CHOLESTEROL   • Unspecified essential hypertension    • Unspecified sleep apnea       Family History reviewed:  Family History   Problem Relation Age of Onset   • Cancer Other         APPENDIX, NEPHEW, Destinee Butler, NEPHEW   • Hypertension Mother    • Sulfate  (90 Base) MCG/ACT Inhalation Aero Soln Inhale 2 puffs into the lungs every 6 (six) hours as needed for Wheezing.  (Patient not taking: Reported on 9/25/2020 ) 1 Inhaler 3   • budesonide 0.5 MG/2ML Inhalation Suspension Take 2 mL (0.5 mg tota

## 2020-11-03 NOTE — TELEPHONE ENCOUNTER
Patient is calling regarding medication furosemide 80 MG Oral Tab. Patient wants to confirm dosage of medication. Patient believes 80MG is too much and believes dosage should be 40MG.  Patient states this medication was prescribed by a heart doctor at [facility]

## 2020-11-03 NOTE — TELEPHONE ENCOUNTER
This was prescribed by cardiology, per notes:     Patient is doing well from a cardiac standpoint. She has mild cardiomyopathy atrial fibrillation on Xarelto with slightly elevated heart rates. Will increase metoprolol to 25 mg twice a day.   Her swelling

## 2020-11-03 NOTE — TELEPHONE ENCOUNTER
Pt was called and informed of Dr. Cherylene League message below and he verbalized understanding. She will be taking 40 mg once a day as she has been doing.  Thanks

## 2020-11-03 NOTE — TELEPHONE ENCOUNTER
please see pt message below and advise. I noted that the furosemide is in her medication list as external and directions are take 40 mg once a day but the Rx is written for Furosemide 80 mg.  Please Advise      furosemide 80 MG Oral Tab   8/14/2020

## 2020-11-16 NOTE — TELEPHONE ENCOUNTER
S/w Mira and informed of findings. She states she will just continue with her insurance cost of $30 /month. No PAF .

## 2020-11-17 NOTE — PROGRESS NOTES
Due to COVID-19 ACTION PLAN, the patient's office visit was converted to a phone visit. ? This patient verbally consents to a telephone visit. Patient unable to do video visit.   Patient states they are Valjean Danger and that they are speaking to me f pharmacy. Patient was advised to call if they experience any new or worsening symptoms.   ?  Follow-up 6 months after echo completed    Ramona Peters MD

## 2020-11-17 NOTE — H&P
[address] [facility] [address] Gastroenterology                                                                                                  Clinic History and Physical     Pa not been recommended. States that she underwent a procedure many years ago to [facility] hiatal hernia. \"    Denies vomiting or hematemesis. No dysphagia or odynophagia.     Omeprazole 40 mg was changed to twice daily dosing a couple of weeks ago and she has no [facility])    • Anxiety state, unspecified    • CAD (coronary artery disease)    • COPD (chronic obstructive pulmonary disease) ([facility])    • Depression    • Emphysema lung ([facility])    • Esophageal reflux    • Extrinsic asthma, unspecified    • Fall 2014    SUTURES O MG Oral Tab Take 1 tablet (25 mg total) by mouth 2 (two) times daily.  180 tablet 3   • Pravastatin Sodium 20 MG Oral Tab TAKE 1 TABLET(20 MG) BY MOUTH EVERY NIGHT 90 tablet 3   • metoprolol Tartrate 25 MG Oral Tab Take 1 tablet (25 mg total) by mouth 2 (tw OTHER (SEE COMMENTS)    Comment:Asthma attack  Perfumes                OTHER (SEE COMMENTS)    Comment:Asthma attack    ROS:   CONSTITUTIONAL:  negative for fevers, rigors  EYES:  negative for diplopia   RESPIRATORY:  negative for severe shortness of breat hyperlipidemia, who presents for evaluation of GERD symptoms      1.   GERD/Carranza's esophagus/nausea/belching: The patient describes nearly constant acid reflux symptoms accompanied by nausea and belching since August 2020 following a hospitalization at a immediate questions/concerns were addressed.       Recommend:  -Schedule EGD w/ possible biopsy w/ Dr. Ma  with MAC related to medical history  -Eligible for NE: No r/t medical history  -Anti-platelets and anti-coagulants: Xarelto (Please contact p requested or ordered in this encounter       Imaging & Referrals:  None     Cc: Dr. Julian Brower.  EDWIN Levine  12/1/2020

## 2020-11-20 NOTE — TELEPHONE ENCOUNTER
S/w Mira and relayed lab results and instructions, she wrote down medication change but needs to leave her ride is there. Will call back to schedule BMP.      DELGADO Gonzalez  P Em Cardio Clinical Staff             Renal function is slowly incre

## 2020-12-01 NOTE — TELEPHONE ENCOUNTER
Request for Xarelto orders faxed to Dr. Corina Guzmán at [phone] with return confirmation. Phone [phone]    Awaiting orders.

## 2020-12-01 NOTE — PROGRESS NOTES
Weston Mejia! Thanks for the update. It appears cardiology is planning for another echo to revaluate for mitral regurg and possible intervention so perhaps that's what she is confused on? I can talk to her tomorrow and find out more.  Agree with EGD, much appreciate

## 2020-12-01 NOTE — TELEPHONE ENCOUNTER
GI RN's    Patient is scheduled for EGD on 1/12/2021. Patient is taking Xarelto. Can you please obtain orders from Dr. Salma Juarez. Thank you!

## 2020-12-01 NOTE — PATIENT INSTRUCTIONS
-Schedule EGD w/ possible biopsy w/ Dr. Akash Denney with MAC related to medical history  Dx: GERD, Carranza's, nausea   -Eligible for NE: No r/t medical history  -Anti-platelets and anti-coagulants: Xarelto (Please contact prescribing MD (Dr. Bethena Burkitt) for

## 2020-12-01 NOTE — TELEPHONE ENCOUNTER
Scheduled for:  EGD [address]24  Provider Name:  Dr. Therese Odom  Date:  1/12/2021  Location:  [facility]  Sedation:  MAC  Time:  3:30 pm, arrival 2:30 pm  Prep:  NPO after midnight  Meds/Allergies Reconciled?: Jennifer/APN reviewed.   Diagnosis with codes:  GERD K21.9;

## 2020-12-23 NOTE — TELEPHONE ENCOUNTER
Per  at Dr. Nirali Iglesias office,patient sees Dr. Elaine Higuera in the [place] office. Phone # [phone]  Request for Xarelto orders faxed to Dr. Elaine Higuera at [phone] with return confirmation.       Awaiting orders

## 2020-12-24 NOTE — TELEPHONE ENCOUNTER
Naomi Mccollum RN         1:57 PM  Note     EGD 1/12/21, can patient hold xeralto and for how long.  Please advise              Min Ferguson, APRN         9:57 AM  Note     May hold for 2 days before then resume as long as no stroke

## 2020-12-24 NOTE — TELEPHONE ENCOUNTER
Patient contacted, verified and message from Dr. Lisbeth Mccall given to stop Xarelto 2 days prior to procedure. Patient voiced understanding.

## 2020-12-28 NOTE — TELEPHONE ENCOUNTER
I spoke to the pt and she is aware to hold her Xarelto for 2 days prior to her EGD on 01/12/2021. She verbalizes understanding.  She will hold the Xarelto on 01/10 & 01/11/2021

## 2021-01-01 ENCOUNTER — MED REC SCAN ONLY (OUTPATIENT)
Dept: INTERNAL MEDICINE CLINIC | Facility: CLINIC | Age: 86
End: 2021-01-01

## 2021-01-01 ENCOUNTER — TELEPHONE (OUTPATIENT)
Dept: CARDIOLOGY CLINIC | Facility: HOSPITAL | Age: 86
End: 2021-01-01

## 2021-01-01 ENCOUNTER — TELEPHONE (OUTPATIENT)
Dept: CARDIOLOGY CLINIC | Facility: CLINIC | Age: 86
End: 2021-01-01

## 2021-01-01 ENCOUNTER — HOSPITAL ENCOUNTER (OUTPATIENT)
Dept: CARDIOLOGY CLINIC | Facility: HOSPITAL | Age: 86
Discharge: HOME | End: 2021-01-01
Attending: INTERNAL MEDICINE
Payer: MEDICARE

## 2021-01-01 ENCOUNTER — TELEPHONE (OUTPATIENT)
Dept: INTERNAL MEDICINE CLINIC | Facility: CLINIC | Age: 86
End: 2021-01-01

## 2021-01-01 ENCOUNTER — EKG ENCOUNTER (OUTPATIENT)
Dept: LAB | Age: 86
End: 2021-01-01
Attending: INTERNAL MEDICINE
Payer: MEDICARE

## 2021-01-01 ENCOUNTER — APPOINTMENT (OUTPATIENT)
Dept: PHYSICAL THERAPY | Facility: HOSPITAL | Age: 86
End: 2021-01-01
Attending: INTERNAL MEDICINE
Payer: MEDICARE

## 2021-01-01 ENCOUNTER — APPOINTMENT (OUTPATIENT)
Dept: CV DIAGNOSTICS | Facility: HOSPITAL | Age: 86
DRG: 266 | End: 2021-01-01
Attending: INTERNAL MEDICINE
Payer: MEDICARE

## 2021-01-01 ENCOUNTER — IMMUNIZATION (OUTPATIENT)
Dept: LAB | Facility: HOSPITAL | Age: 86
End: 2021-01-01
Attending: HOSPITALIST
Payer: MEDICARE

## 2021-01-01 ENCOUNTER — OFFICE VISIT (OUTPATIENT)
Dept: INTERNAL MEDICINE CLINIC | Facility: CLINIC | Age: 86
End: 2021-01-01
Payer: MEDICARE

## 2021-01-01 ENCOUNTER — LAB ENCOUNTER (OUTPATIENT)
Dept: LAB | Age: 86
End: 2021-01-01
Attending: INTERNAL MEDICINE
Payer: MEDICARE

## 2021-01-01 ENCOUNTER — HOSPITAL ENCOUNTER (INPATIENT)
Facility: HOSPITAL | Age: 86
LOS: 7 days | Discharge: NURSING, REHAB OR LONG-TERM CARE | DRG: 291 | End: 2021-01-01
Attending: EMERGENCY MEDICINE | Admitting: EMERGENCY MEDICINE
Payer: MEDICARE

## 2021-01-01 ENCOUNTER — APPOINTMENT (OUTPATIENT)
Dept: ULTRASOUND IMAGING | Facility: HOSPITAL | Age: 86
DRG: 291 | End: 2021-01-01
Attending: EMERGENCY MEDICINE
Payer: MEDICARE

## 2021-01-01 ENCOUNTER — ANESTHESIA (OUTPATIENT)
Dept: ENDOSCOPY | Facility: HOSPITAL | Age: 86
End: 2021-01-01
Payer: MEDICARE

## 2021-01-01 ENCOUNTER — APPOINTMENT (OUTPATIENT)
Dept: ULTRASOUND IMAGING | Facility: HOSPITAL | Age: 86
DRG: 291 | End: 2021-01-01
Attending: INTERNAL MEDICINE
Payer: MEDICARE

## 2021-01-01 ENCOUNTER — EXTERNAL FACILITY (OUTPATIENT)
Dept: FAMILY MEDICINE CLINIC | Facility: CLINIC | Age: 86
End: 2021-01-01

## 2021-01-01 ENCOUNTER — HOSPITAL ENCOUNTER (OUTPATIENT)
Dept: GENERAL RADIOLOGY | Facility: HOSPITAL | Age: 86
Discharge: HOME | DRG: 266 | End: 2021-01-01
Attending: INTERNAL MEDICINE | Admitting: INTERNAL MEDICINE
Payer: MEDICARE

## 2021-01-01 ENCOUNTER — APPOINTMENT (OUTPATIENT)
Dept: CV DIAGNOSTICS | Facility: HOSPITAL | Age: 86
DRG: 291 | End: 2021-01-01
Attending: INTERNAL MEDICINE
Payer: MEDICARE

## 2021-01-01 ENCOUNTER — IMMUNIZATION (OUTPATIENT)
Dept: LAB | Facility: HOSPITAL | Age: 86
End: 2021-01-01
Attending: EMERGENCY MEDICINE
Payer: MEDICARE

## 2021-01-01 ENCOUNTER — HOSPITAL ENCOUNTER (OUTPATIENT)
Dept: LAB | Facility: HOSPITAL | Age: 86
Discharge: HOME | End: 2021-01-01
Attending: INTERNAL MEDICINE
Payer: MEDICARE

## 2021-01-01 ENCOUNTER — OFFICE VISIT (OUTPATIENT)
Dept: CARDIOLOGY CLINIC | Facility: CLINIC | Age: 86
End: 2021-01-01
Payer: MEDICARE

## 2021-01-01 ENCOUNTER — APPOINTMENT (OUTPATIENT)
Dept: CT IMAGING | Facility: HOSPITAL | Age: 86
DRG: 291 | End: 2021-01-01
Attending: INTERNAL MEDICINE
Payer: MEDICARE

## 2021-01-01 ENCOUNTER — HOSPITAL ENCOUNTER (OUTPATIENT)
Dept: CV DIAGNOSTICS | Age: 86
Discharge: HOME | End: 2021-01-01
Attending: INTERNAL MEDICINE
Payer: MEDICARE

## 2021-01-01 ENCOUNTER — APPOINTMENT (OUTPATIENT)
Dept: GENERAL RADIOLOGY | Facility: HOSPITAL | Age: 86
DRG: 266 | End: 2021-01-01
Attending: INTERNAL MEDICINE
Payer: MEDICARE

## 2021-01-01 ENCOUNTER — HOSPITAL ENCOUNTER (OUTPATIENT)
Dept: GENERAL RADIOLOGY | Age: 86
Discharge: HOME | End: 2021-01-01
Attending: INTERNAL MEDICINE
Payer: MEDICARE

## 2021-01-01 ENCOUNTER — HOSPITAL ENCOUNTER (OUTPATIENT)
Facility: HOSPITAL | Age: 86
Setting detail: HOSPITAL OUTPATIENT SURGERY
Discharge: HOME | End: 2021-01-01
Attending: INTERNAL MEDICINE | Admitting: INTERNAL MEDICINE
Payer: MEDICARE

## 2021-01-01 ENCOUNTER — TELEPHONE (OUTPATIENT)
Dept: PHYSICAL THERAPY | Facility: HOSPITAL | Age: 86
End: 2021-01-01

## 2021-01-01 ENCOUNTER — SNF VISIT (OUTPATIENT)
Dept: INTERNAL MEDICINE CLINIC | Age: 86
End: 2021-01-01

## 2021-01-01 ENCOUNTER — NURSE ONLY (OUTPATIENT)
Dept: INTERNAL MEDICINE CLINIC | Facility: CLINIC | Age: 86
End: 2021-01-01
Payer: MEDICARE

## 2021-01-01 ENCOUNTER — APPOINTMENT (OUTPATIENT)
Dept: GENERAL RADIOLOGY | Facility: HOSPITAL | Age: 86
DRG: 291 | End: 2021-01-01
Attending: NURSE PRACTITIONER
Payer: MEDICARE

## 2021-01-01 ENCOUNTER — APPOINTMENT (OUTPATIENT)
Dept: GENERAL RADIOLOGY | Facility: HOSPITAL | Age: 86
DRG: 291 | End: 2021-01-01
Attending: INTERNAL MEDICINE
Payer: MEDICARE

## 2021-01-01 ENCOUNTER — APPOINTMENT (OUTPATIENT)
Dept: GENERAL RADIOLOGY | Facility: HOSPITAL | Age: 86
DRG: 266 | End: 2021-01-01
Attending: NURSE PRACTITIONER
Payer: MEDICARE

## 2021-01-01 ENCOUNTER — INITIAL APN SNF VISIT (OUTPATIENT)
Dept: INTERNAL MEDICINE CLINIC | Age: 86
End: 2021-01-01

## 2021-01-01 ENCOUNTER — HOSPITAL ENCOUNTER (INPATIENT)
Dept: INTERVENTIONAL RADIOLOGY/VASCULAR | Facility: HOSPITAL | Age: 86
LOS: 7 days | Discharge: NURSING, REHAB OR LONG-TERM CARE | DRG: 266 | End: 2021-01-01
Attending: INTERNAL MEDICINE | Admitting: INTERNAL MEDICINE
Payer: MEDICARE

## 2021-01-01 ENCOUNTER — APPOINTMENT (OUTPATIENT)
Dept: GENERAL RADIOLOGY | Facility: HOSPITAL | Age: 86
DRG: 291 | End: 2021-01-01
Attending: EMERGENCY MEDICINE
Payer: MEDICARE

## 2021-01-01 ENCOUNTER — NURSE ONLY (OUTPATIENT)
Dept: LAB | Age: 86
DRG: 291 | End: 2021-01-01
Attending: NURSE PRACTITIONER
Payer: MEDICARE

## 2021-01-01 ENCOUNTER — ANESTHESIA EVENT (OUTPATIENT)
Dept: INTERVENTIONAL RADIOLOGY/VASCULAR | Facility: HOSPITAL | Age: 86
DRG: 266 | End: 2021-01-01
Payer: MEDICARE

## 2021-01-01 ENCOUNTER — NURSE ONLY (OUTPATIENT)
Dept: LAB | Age: 86
End: 2021-01-01
Attending: FAMILY MEDICINE
Payer: MEDICARE

## 2021-01-01 ENCOUNTER — ANESTHESIA EVENT (OUTPATIENT)
Dept: ENDOSCOPY | Facility: HOSPITAL | Age: 86
End: 2021-01-01
Payer: MEDICARE

## 2021-01-01 ENCOUNTER — APPOINTMENT (OUTPATIENT)
Dept: CV DIAGNOSTICS | Facility: HOSPITAL | Age: 86
DRG: 266 | End: 2021-01-01
Attending: NURSE PRACTITIONER
Payer: MEDICARE

## 2021-01-01 VITALS
WEIGHT: 125 LBS | DIASTOLIC BLOOD PRESSURE: 68 MMHG | HEIGHT: 59 IN | HEART RATE: 89 BPM | TEMPERATURE: 98 F | BODY MASS INDEX: 25.2 KG/M2 | SYSTOLIC BLOOD PRESSURE: 105 MMHG

## 2021-01-01 VITALS
DIASTOLIC BLOOD PRESSURE: 50 MMHG | WEIGHT: 125 LBS | BODY MASS INDEX: 23.6 KG/M2 | TEMPERATURE: 98 F | HEART RATE: 55 BPM | HEIGHT: 61 IN | RESPIRATION RATE: 19 BRPM | OXYGEN SATURATION: 96 % | SYSTOLIC BLOOD PRESSURE: 104 MMHG

## 2021-01-01 VITALS
BODY MASS INDEX: 25 KG/M2 | HEART RATE: 90 BPM | TEMPERATURE: 97 F | SYSTOLIC BLOOD PRESSURE: 102 MMHG | WEIGHT: 125.38 LBS | DIASTOLIC BLOOD PRESSURE: 65 MMHG | RESPIRATION RATE: 18 BRPM | OXYGEN SATURATION: 92 %

## 2021-01-01 VITALS
RESPIRATION RATE: 16 BRPM | SYSTOLIC BLOOD PRESSURE: 106 MMHG | DIASTOLIC BLOOD PRESSURE: 67 MMHG | OXYGEN SATURATION: 95 % | HEART RATE: 80 BPM

## 2021-01-01 VITALS
SYSTOLIC BLOOD PRESSURE: 109 MMHG | HEIGHT: 63 IN | WEIGHT: 133 LBS | TEMPERATURE: 97 F | BODY MASS INDEX: 23.57 KG/M2 | DIASTOLIC BLOOD PRESSURE: 64 MMHG | RESPIRATION RATE: 18 BRPM | HEART RATE: 92 BPM | OXYGEN SATURATION: 95 %

## 2021-01-01 VITALS
BODY MASS INDEX: 25 KG/M2 | SYSTOLIC BLOOD PRESSURE: 114 MMHG | WEIGHT: 124.81 LBS | OXYGEN SATURATION: 96 % | HEART RATE: 78 BPM | DIASTOLIC BLOOD PRESSURE: 59 MMHG | TEMPERATURE: 98 F | RESPIRATION RATE: 20 BRPM

## 2021-01-01 VITALS
TEMPERATURE: 98 F | BODY MASS INDEX: 26.21 KG/M2 | DIASTOLIC BLOOD PRESSURE: 60 MMHG | HEIGHT: 59 IN | HEART RATE: 76 BPM | SYSTOLIC BLOOD PRESSURE: 105 MMHG | WEIGHT: 130 LBS

## 2021-01-01 VITALS
BODY MASS INDEX: 26.21 KG/M2 | SYSTOLIC BLOOD PRESSURE: 109 MMHG | DIASTOLIC BLOOD PRESSURE: 72 MMHG | WEIGHT: 130 LBS | HEART RATE: 91 BPM | HEIGHT: 59 IN

## 2021-01-01 VITALS
TEMPERATURE: 98 F | SYSTOLIC BLOOD PRESSURE: 93 MMHG | RESPIRATION RATE: 18 BRPM | BODY MASS INDEX: 27 KG/M2 | WEIGHT: 134.06 LBS | OXYGEN SATURATION: 95 % | DIASTOLIC BLOOD PRESSURE: 64 MMHG | HEART RATE: 96 BPM

## 2021-01-01 VITALS — SYSTOLIC BLOOD PRESSURE: 106 MMHG | OXYGEN SATURATION: 100 % | DIASTOLIC BLOOD PRESSURE: 80 MMHG | HEART RATE: 67 BPM

## 2021-01-01 DIAGNOSIS — J96.91 RESPIRATORY FAILURE WITH HYPOXIA, UNSPECIFIED CHRONICITY (HCC): ICD-10-CM

## 2021-01-01 DIAGNOSIS — G47.33 OSA ON CPAP: ICD-10-CM

## 2021-01-01 DIAGNOSIS — J96.11 CHRONIC RESPIRATORY FAILURE WITH HYPOXIA (HCC): ICD-10-CM

## 2021-01-01 DIAGNOSIS — R06.02 SHORTNESS OF BREATH: ICD-10-CM

## 2021-01-01 DIAGNOSIS — I50.33 ACUTE ON CHRONIC DIASTOLIC HEART FAILURE (HCC): Primary | ICD-10-CM

## 2021-01-01 DIAGNOSIS — M79.671 RIGHT FOOT PAIN: ICD-10-CM

## 2021-01-01 DIAGNOSIS — I42.9 CARDIOMYOPATHY, UNSPECIFIED TYPE (HCC): ICD-10-CM

## 2021-01-01 DIAGNOSIS — I50.31 ACUTE HEART FAILURE WITH PRESERVED EJECTION FRACTION (HCC): ICD-10-CM

## 2021-01-01 DIAGNOSIS — E78.00 HIGH CHOLESTEROL: ICD-10-CM

## 2021-01-01 DIAGNOSIS — R53.81 PHYSICAL DECONDITIONING: ICD-10-CM

## 2021-01-01 DIAGNOSIS — J18.9 HCAP (HEALTHCARE-ASSOCIATED PNEUMONIA): Primary | ICD-10-CM

## 2021-01-01 DIAGNOSIS — J18.9 PNEUMONIA OF RIGHT LOWER LOBE DUE TO INFECTIOUS ORGANISM: ICD-10-CM

## 2021-01-01 DIAGNOSIS — E25.8: ICD-10-CM

## 2021-01-01 DIAGNOSIS — Z95.818 S/P MITRAL VALVE CLIP IMPLANTATION: ICD-10-CM

## 2021-01-01 DIAGNOSIS — I34.0 NONRHEUMATIC MITRAL VALVE REGURGITATION: ICD-10-CM

## 2021-01-01 DIAGNOSIS — R33.9 URINARY RETENTION: ICD-10-CM

## 2021-01-01 DIAGNOSIS — J44.9 CHRONIC OBSTRUCTIVE PULMONARY DISEASE, UNSPECIFIED COPD TYPE (HCC): ICD-10-CM

## 2021-01-01 DIAGNOSIS — I50.23 ACUTE ON CHRONIC SYSTOLIC CONGESTIVE HEART FAILURE (HCC): ICD-10-CM

## 2021-01-01 DIAGNOSIS — I34.0 NONRHEUMATIC MITRAL VALVE REGURGITATION: Primary | ICD-10-CM

## 2021-01-01 DIAGNOSIS — K22.70 BARRETT'S ESOPHAGUS WITHOUT DYSPLASIA: ICD-10-CM

## 2021-01-01 DIAGNOSIS — F41.9 ANXIETY DISORDER, UNSPECIFIED TYPE: ICD-10-CM

## 2021-01-01 DIAGNOSIS — K44.9 HIATAL HERNIA: ICD-10-CM

## 2021-01-01 DIAGNOSIS — R60.9 EDEMA, UNSPECIFIED TYPE: ICD-10-CM

## 2021-01-01 DIAGNOSIS — D64.9 ANEMIA, UNSPECIFIED TYPE: ICD-10-CM

## 2021-01-01 DIAGNOSIS — Z11.59 SPECIAL SCREENING EXAMINATION FOR VIRAL DISEASE: ICD-10-CM

## 2021-01-01 DIAGNOSIS — Z09 HOSPITAL DISCHARGE FOLLOW-UP: ICD-10-CM

## 2021-01-01 DIAGNOSIS — K44.9 HIATAL HERNIA WITH GERD: ICD-10-CM

## 2021-01-01 DIAGNOSIS — I48.0 PAROXYSMAL ATRIAL FIBRILLATION (HCC): ICD-10-CM

## 2021-01-01 DIAGNOSIS — I50.43 ACUTE ON CHRONIC COMBINED SYSTOLIC AND DIASTOLIC CONGESTIVE HEART FAILURE (HCC): ICD-10-CM

## 2021-01-01 DIAGNOSIS — I34.0 MITRAL VALVE INSUFFICIENCY, UNSPECIFIED ETIOLOGY: ICD-10-CM

## 2021-01-01 DIAGNOSIS — Z98.890 S/P MITRAL VALVE CLIP IMPLANTATION: ICD-10-CM

## 2021-01-01 DIAGNOSIS — N39.0 URINARY TRACT INFECTION, SITE NOT SPECIFIED: Primary | ICD-10-CM

## 2021-01-01 DIAGNOSIS — J44.9 OBSTRUCTIVE CHRONIC BRONCHITIS WITHOUT EXACERBATION (HCC): ICD-10-CM

## 2021-01-01 DIAGNOSIS — N17.9 AKI (ACUTE KIDNEY INJURY) (HCC): ICD-10-CM

## 2021-01-01 DIAGNOSIS — J18.9 HCAP (HEALTHCARE-ASSOCIATED PNEUMONIA): ICD-10-CM

## 2021-01-01 DIAGNOSIS — Z95.818 S/P MITRAL VALVE CLIP IMPLANTATION: Primary | ICD-10-CM

## 2021-01-01 DIAGNOSIS — I51.89: ICD-10-CM

## 2021-01-01 DIAGNOSIS — K86.3 PANCREATIC PSEUDOCYST: ICD-10-CM

## 2021-01-01 DIAGNOSIS — N18.31 STAGE 3A CHRONIC KIDNEY DISEASE (HCC): ICD-10-CM

## 2021-01-01 DIAGNOSIS — D72.829 LEUKOCYTOSIS: ICD-10-CM

## 2021-01-01 DIAGNOSIS — R06.02 SHORT OF BREATH ON EXERTION: ICD-10-CM

## 2021-01-01 DIAGNOSIS — K21.9 GASTRIC REFLUX: ICD-10-CM

## 2021-01-01 DIAGNOSIS — I48.91 ATRIAL FIBRILLATION, UNSPECIFIED TYPE (HCC): ICD-10-CM

## 2021-01-01 DIAGNOSIS — Z98.890 S/P MITRAL VALVE CLIP IMPLANTATION: Primary | ICD-10-CM

## 2021-01-01 DIAGNOSIS — I48.91 ATRIAL FIBRILLATION WITH RAPID VENTRICULAR RESPONSE (HCC): ICD-10-CM

## 2021-01-01 DIAGNOSIS — Z11.1 PPD SCREENING TEST: Primary | ICD-10-CM

## 2021-01-01 DIAGNOSIS — Z01.818 PRE-OP TESTING: ICD-10-CM

## 2021-01-01 DIAGNOSIS — I36.1 NONRHEUMATIC TRICUSPID VALVE REGURGITATION: ICD-10-CM

## 2021-01-01 DIAGNOSIS — F32.A DEPRESSIVE DISORDER: ICD-10-CM

## 2021-01-01 DIAGNOSIS — I50.9 ACUTE CONGESTIVE HEART FAILURE, UNSPECIFIED HEART FAILURE TYPE (HCC): ICD-10-CM

## 2021-01-01 DIAGNOSIS — B37.0 ORAL THRUSH: ICD-10-CM

## 2021-01-01 DIAGNOSIS — K21.9 GASTROESOPHAGEAL REFLUX DISEASE, UNSPECIFIED WHETHER ESOPHAGITIS PRESENT: ICD-10-CM

## 2021-01-01 DIAGNOSIS — D35.00 ADRENAL CORTICAL ADENOMA, UNSPECIFIED LATERALITY: ICD-10-CM

## 2021-01-01 DIAGNOSIS — Z23 NEED FOR VACCINATION: Primary | ICD-10-CM

## 2021-01-01 DIAGNOSIS — F32.A DEPRESSIVE DISORDER: Primary | ICD-10-CM

## 2021-01-01 DIAGNOSIS — R11.0 NAUSEA: ICD-10-CM

## 2021-01-01 DIAGNOSIS — M19.90 ARTHRITIS: ICD-10-CM

## 2021-01-01 DIAGNOSIS — Z99.89 OSA ON CPAP: ICD-10-CM

## 2021-01-01 DIAGNOSIS — J44.9 COPD WITH ASTHMA (HCC): ICD-10-CM

## 2021-01-01 DIAGNOSIS — R09.02 HYPOXIA: ICD-10-CM

## 2021-01-01 DIAGNOSIS — J44.9 CHRONIC OBSTRUCTIVE PULMONARY DISEASE, UNSPECIFIED COPD TYPE (HCC): Primary | ICD-10-CM

## 2021-01-01 DIAGNOSIS — Z01.818 PRE-OP TESTING: Primary | ICD-10-CM

## 2021-01-01 DIAGNOSIS — Z23 NEED FOR VACCINATION: ICD-10-CM

## 2021-01-01 DIAGNOSIS — I34.0 MITRAL VALVE INSUFFICIENCY, UNSPECIFIED ETIOLOGY: Primary | ICD-10-CM

## 2021-01-01 DIAGNOSIS — M79.89 LEG SWELLING: ICD-10-CM

## 2021-01-01 DIAGNOSIS — G47.33 OBSTRUCTIVE SLEEP APNEA (ADULT) (PEDIATRIC): ICD-10-CM

## 2021-01-01 DIAGNOSIS — I10 PRIMARY HYPERTENSION: ICD-10-CM

## 2021-01-01 DIAGNOSIS — K21.9 HIATAL HERNIA WITH GERD: ICD-10-CM

## 2021-01-01 DIAGNOSIS — Z78.9 DECREASED ACTIVITIES OF DAILY LIVING (ADL): ICD-10-CM

## 2021-01-01 DIAGNOSIS — I48.91 ATRIAL FIBRILLATION, UNSPECIFIED TYPE (HCC): Primary | ICD-10-CM

## 2021-01-01 DIAGNOSIS — R33.8 ACUTE URINARY RETENTION: ICD-10-CM

## 2021-01-01 DIAGNOSIS — R53.83 FATIGUE, UNSPECIFIED TYPE: ICD-10-CM

## 2021-01-01 DIAGNOSIS — E56.9 VITAMIN DISEASE: ICD-10-CM

## 2021-01-01 DIAGNOSIS — Z11.1 PPD SCREENING TEST: ICD-10-CM

## 2021-01-01 DIAGNOSIS — M10.9 ACUTE GOUT OF RIGHT FOOT, UNSPECIFIED CAUSE: ICD-10-CM

## 2021-01-01 DIAGNOSIS — Z09 HOSPITAL DISCHARGE FOLLOW-UP: Primary | ICD-10-CM

## 2021-01-01 DIAGNOSIS — E56.9 VITAMIN DISEASE: Primary | ICD-10-CM

## 2021-01-01 DIAGNOSIS — R53.1 WEAKNESS: ICD-10-CM

## 2021-01-01 DIAGNOSIS — I50.43 ACUTE ON CHRONIC COMBINED SYSTOLIC AND DIASTOLIC HEART FAILURE (HCC): ICD-10-CM

## 2021-01-01 DIAGNOSIS — I34.8 MYXOID TRANSFORMATION OF MITRAL VALVE: Primary | ICD-10-CM

## 2021-01-01 DIAGNOSIS — L30.9 DERMATITIS: Primary | ICD-10-CM

## 2021-01-01 LAB
ALBUMIN SERPL-MCNC: 2.8 G/DL (ref 3.4–5)
ALBUMIN SERPL-MCNC: 2.9 G/DL (ref 3.4–5)
ALBUMIN SERPL-MCNC: 3.4 G/DL (ref 3.4–5)
ALBUMIN SERPL-MCNC: 3.5 G/DL (ref 3.4–5)
ALBUMIN SERPL-MCNC: 3.5 G/DL (ref 3.4–5)
ALBUMIN/GLOB SERPL: 0.7 {RATIO} (ref 1–2)
ALBUMIN/GLOB SERPL: 0.8 {RATIO} (ref 1–2)
ALBUMIN/GLOB SERPL: 0.9 {RATIO} (ref 1–2)
ALBUMIN/GLOB SERPL: 0.9 {RATIO} (ref 1–2)
ALBUMIN/GLOB SERPL: 1 {RATIO} (ref 1–2)
ALP LIVER SERPL-CCNC: 105 U/L
ALP LIVER SERPL-CCNC: 79 U/L
ALP LIVER SERPL-CCNC: 80 U/L
ALP LIVER SERPL-CCNC: 85 U/L
ALP LIVER SERPL-CCNC: 96 U/L
ALPHA-1-ANTITRYPSIN: 213 MG/DL
ALT SERPL-CCNC: 20 U/L
ALT SERPL-CCNC: 22 U/L
ALT SERPL-CCNC: 23 U/L
ALT SERPL-CCNC: 27 U/L
ALT SERPL-CCNC: 32 U/L
ALT SERPL-CCNC: 42 U/L
ANION GAP SERPL CALC-SCNC: 10 MMOL/L (ref 0–18)
ANION GAP SERPL CALC-SCNC: 11 MMOL/L (ref 0–18)
ANION GAP SERPL CALC-SCNC: 4 MMOL/L (ref 0–18)
ANION GAP SERPL CALC-SCNC: 5 MMOL/L (ref 0–18)
ANION GAP SERPL CALC-SCNC: 6 MMOL/L (ref 0–18)
ANION GAP SERPL CALC-SCNC: 7 MMOL/L (ref 0–18)
ANION GAP SERPL CALC-SCNC: 8 MMOL/L (ref 0–18)
ANION GAP SERPL CALC-SCNC: 9 MMOL/L (ref 0–18)
ANION GAP SERPL CALC-SCNC: 9 MMOL/L (ref 0–18)
ANTIBODY SCREEN: NEGATIVE
ARTERIAL PATENCY WRIST A: POSITIVE
AST SERPL-CCNC: 13 U/L (ref 15–37)
AST SERPL-CCNC: 17 U/L (ref 15–37)
AST SERPL-CCNC: 19 U/L (ref 15–37)
AST SERPL-CCNC: 22 U/L (ref 15–37)
AST SERPL-CCNC: 29 U/L (ref 15–37)
AST SERPL-CCNC: 56 U/L (ref 15–37)
ATRIAL RATE: 101 BPM
ATRIAL RATE: 288 BPM
BASE EXCESS BLDA CALC-SCNC: -3.8 MMOL/L (ref ?–2)
BASOPHILS # BLD AUTO: 0.05 X10(3) UL (ref 0–0.2)
BASOPHILS # BLD AUTO: 0.06 X10(3) UL (ref 0–0.2)
BASOPHILS # BLD AUTO: 0.06 X10(3) UL (ref 0–0.2)
BASOPHILS # BLD AUTO: 0.07 X10(3) UL (ref 0–0.2)
BASOPHILS # BLD AUTO: 0.09 X10(3) UL (ref 0–0.2)
BASOPHILS NFR BLD AUTO: 0.5 %
BASOPHILS NFR BLD AUTO: 0.6 %
BASOPHILS NFR BLD AUTO: 0.6 %
BASOPHILS NFR BLD AUTO: 0.7 %
BASOPHILS NFR BLD AUTO: 1 %
BILIRUB SERPL-MCNC: 0.5 MG/DL (ref 0.1–2)
BILIRUB SERPL-MCNC: 0.6 MG/DL (ref 0.1–2)
BILIRUB SERPL-MCNC: 0.7 MG/DL (ref 0.1–2)
BILIRUB SERPL-MCNC: 0.8 MG/DL (ref 0.1–2)
BILIRUB SERPL-MCNC: 0.9 MG/DL (ref 0.1–2)
BILIRUB UR QL STRIP.AUTO: NEGATIVE
BLOOD TYPE BARCODE: 5100
BODY TEMPERATURE: 98.2 F
BUN BLD-MCNC: 32 MG/DL (ref 7–18)
BUN BLD-MCNC: 33 MG/DL (ref 7–18)
BUN BLD-MCNC: 34 MG/DL (ref 7–18)
BUN BLD-MCNC: 35 MG/DL (ref 7–18)
BUN BLD-MCNC: 41 MG/DL (ref 7–18)
BUN BLD-MCNC: 51 MG/DL (ref 7–18)
BUN BLD-MCNC: 54 MG/DL (ref 7–18)
BUN BLD-MCNC: 54 MG/DL (ref 7–18)
BUN BLD-MCNC: 56 MG/DL (ref 7–18)
BUN BLD-MCNC: 56 MG/DL (ref 7–18)
BUN BLD-MCNC: 57 MG/DL (ref 7–18)
BUN BLD-MCNC: 58 MG/DL (ref 7–18)
BUN BLD-MCNC: 58 MG/DL (ref 7–18)
BUN BLD-MCNC: 59 MG/DL (ref 7–18)
BUN BLD-MCNC: 59 MG/DL (ref 7–18)
BUN BLD-MCNC: 60 MG/DL (ref 7–18)
BUN BLD-MCNC: 62 MG/DL (ref 7–18)
BUN BLD-MCNC: 63 MG/DL (ref 7–18)
BUN BLD-MCNC: 63 MG/DL (ref 7–18)
BUN BLD-MCNC: 65 MG/DL (ref 7–18)
BUN BLD-MCNC: 67 MG/DL (ref 7–18)
BUN BLD-MCNC: 68 MG/DL (ref 7–18)
BUN BLD-MCNC: 69 MG/DL (ref 7–18)
BUN/CREAT SERPL: 26 (ref 10–20)
BUN/CREAT SERPL: 26.9 (ref 10–20)
BUN/CREAT SERPL: 29.7 (ref 10–20)
CA-I BLD-SCNC: 1.13 MMOL/L (ref 1.12–1.32)
CALCIUM BLD-MCNC: 10 MG/DL (ref 8.5–10.1)
CALCIUM BLD-MCNC: 8.5 MG/DL (ref 8.5–10.1)
CALCIUM BLD-MCNC: 8.7 MG/DL (ref 8.5–10.1)
CALCIUM BLD-MCNC: 8.8 MG/DL (ref 8.5–10.1)
CALCIUM BLD-MCNC: 8.9 MG/DL (ref 8.5–10.1)
CALCIUM BLD-MCNC: 9 MG/DL (ref 8.5–10.1)
CALCIUM BLD-MCNC: 9 MG/DL (ref 8.5–10.1)
CALCIUM BLD-MCNC: 9.1 MG/DL (ref 8.5–10.1)
CALCIUM BLD-MCNC: 9.2 MG/DL (ref 8.5–10.1)
CALCIUM BLD-MCNC: 9.3 MG/DL (ref 8.5–10.1)
CALCIUM BLD-MCNC: 9.4 MG/DL (ref 8.5–10.1)
CALCIUM BLD-MCNC: 9.5 MG/DL (ref 8.5–10.1)
CALCIUM BLD-MCNC: 9.6 MG/DL (ref 8.5–10.1)
CALCIUM BLD-MCNC: 9.7 MG/DL (ref 8.5–10.1)
CHLORIDE SERPL-SCNC: 100 MMOL/L (ref 98–112)
CHLORIDE SERPL-SCNC: 101 MMOL/L (ref 98–112)
CHLORIDE SERPL-SCNC: 102 MMOL/L (ref 98–112)
CHLORIDE SERPL-SCNC: 103 MMOL/L (ref 98–112)
CHLORIDE SERPL-SCNC: 104 MMOL/L (ref 98–112)
CHLORIDE SERPL-SCNC: 105 MMOL/L (ref 98–112)
CHLORIDE SERPL-SCNC: 105 MMOL/L (ref 98–112)
CHLORIDE SERPL-SCNC: 106 MMOL/L (ref 98–112)
CHLORIDE SERPL-SCNC: 106 MMOL/L (ref 98–112)
CHLORIDE SERPL-SCNC: 97 MMOL/L (ref 98–112)
CHLORIDE SERPL-SCNC: 99 MMOL/L (ref 98–112)
CHLORIDE SERPL-SCNC: 99 MMOL/L (ref 98–112)
CHOLEST SMN-MCNC: 121 MG/DL (ref ?–200)
CLARITY UR REFRACT.AUTO: CLEAR
CO2 SERPL-SCNC: 21 MMOL/L (ref 21–32)
CO2 SERPL-SCNC: 22 MMOL/L (ref 21–32)
CO2 SERPL-SCNC: 24 MMOL/L (ref 21–32)
CO2 SERPL-SCNC: 25 MMOL/L (ref 21–32)
CO2 SERPL-SCNC: 27 MMOL/L (ref 21–32)
CO2 SERPL-SCNC: 28 MMOL/L (ref 21–32)
CO2 SERPL-SCNC: 29 MMOL/L (ref 21–32)
CO2 SERPL-SCNC: 30 MMOL/L (ref 21–32)
CO2 SERPL-SCNC: 30 MMOL/L (ref 21–32)
CO2 SERPL-SCNC: 31 MMOL/L (ref 21–32)
COHGB MFR BLD: 1.1 % SAT (ref 0–3)
COLOR UR AUTO: YELLOW
CREAT BLD-MCNC: 1.11 MG/DL
CREAT BLD-MCNC: 1.19 MG/DL
CREAT BLD-MCNC: 1.2 MG/DL
CREAT BLD-MCNC: 1.31 MG/DL
CREAT BLD-MCNC: 1.31 MG/DL
CREAT BLD-MCNC: 1.35 MG/DL
CREAT BLD-MCNC: 1.39 MG/DL
CREAT BLD-MCNC: 1.43 MG/DL
CREAT BLD-MCNC: 1.46 MG/DL
CREAT BLD-MCNC: 1.47 MG/DL
CREAT BLD-MCNC: 1.47 MG/DL
CREAT BLD-MCNC: 1.51 MG/DL
CREAT BLD-MCNC: 1.51 MG/DL
CREAT BLD-MCNC: 1.56 MG/DL
CREAT BLD-MCNC: 1.58 MG/DL
CREAT BLD-MCNC: 1.65 MG/DL
CREAT BLD-MCNC: 1.65 MG/DL
CREAT BLD-MCNC: 1.69 MG/DL
CREAT BLD-MCNC: 1.76 MG/DL
CREAT BLD-MCNC: 1.8 MG/DL
CREAT BLD-MCNC: 1.98 MG/DL
CREAT BLD-MCNC: 1.98 MG/DL
CREAT BLD-MCNC: 2.21 MG/DL
CREAT UR-SCNC: 50 MG/DL
DEPRECATED RDW RBC AUTO: 56 FL (ref 35.1–46.3)
EOSINOPHIL # BLD AUTO: 0.17 X10(3) UL (ref 0–0.7)
EOSINOPHIL # BLD AUTO: 0.2 X10(3) UL (ref 0–0.7)
EOSINOPHIL # BLD AUTO: 0.21 X10(3) UL (ref 0–0.7)
EOSINOPHIL # BLD AUTO: 0.23 X10(3) UL (ref 0–0.7)
EOSINOPHIL # BLD AUTO: 0.33 X10(3) UL (ref 0–0.7)
EOSINOPHIL NFR BLD AUTO: 1.5 %
EOSINOPHIL NFR BLD AUTO: 2.1 %
EOSINOPHIL NFR BLD AUTO: 2.3 %
EOSINOPHIL NFR BLD AUTO: 2.9 %
EOSINOPHIL NFR BLD AUTO: 3.4 %
ERYTHROCYTE [DISTWIDTH] IN BLOOD BY AUTOMATED COUNT: 15.4 %
ERYTHROCYTE [DISTWIDTH] IN BLOOD BY AUTOMATED COUNT: 15.5 %
ERYTHROCYTE [DISTWIDTH] IN BLOOD BY AUTOMATED COUNT: 15.7 %
ERYTHROCYTE [DISTWIDTH] IN BLOOD BY AUTOMATED COUNT: 15.9 % (ref 11–15)
ERYTHROCYTE [DISTWIDTH] IN BLOOD BY AUTOMATED COUNT: 16 %
ERYTHROCYTE [DISTWIDTH] IN BLOOD BY AUTOMATED COUNT: 16.1 %
ERYTHROCYTE [DISTWIDTH] IN BLOOD BY AUTOMATED COUNT: 16.1 %
ERYTHROCYTE [DISTWIDTH] IN BLOOD BY AUTOMATED COUNT: 16.2 %
EXPIRATORY PRESSURE: 6 CM H2O
FIO2: 30 %
GLOBULIN PLAS-MCNC: 3.4 G/DL (ref 2.8–4.4)
GLOBULIN PLAS-MCNC: 3.7 G/DL (ref 2.8–4.4)
GLOBULIN PLAS-MCNC: 3.8 G/DL (ref 2.8–4.4)
GLOBULIN PLAS-MCNC: 3.9 G/DL (ref 2.8–4.4)
GLOBULIN PLAS-MCNC: 4 G/DL (ref 2.8–4.4)
GLUCOSE BLD-MCNC: 101 MG/DL (ref 70–99)
GLUCOSE BLD-MCNC: 102 MG/DL (ref 70–99)
GLUCOSE BLD-MCNC: 104 MG/DL (ref 70–99)
GLUCOSE BLD-MCNC: 105 MG/DL (ref 70–99)
GLUCOSE BLD-MCNC: 110 MG/DL (ref 70–99)
GLUCOSE BLD-MCNC: 111 MG/DL (ref 70–99)
GLUCOSE BLD-MCNC: 115 MG/DL (ref 70–99)
GLUCOSE BLD-MCNC: 117 MG/DL (ref 70–99)
GLUCOSE BLD-MCNC: 119 MG/DL (ref 70–99)
GLUCOSE BLD-MCNC: 119 MG/DL (ref 70–99)
GLUCOSE BLD-MCNC: 120 MG/DL (ref 70–99)
GLUCOSE BLD-MCNC: 121 MG/DL (ref 70–99)
GLUCOSE BLD-MCNC: 122 MG/DL (ref 70–99)
GLUCOSE BLD-MCNC: 123 MG/DL (ref 70–99)
GLUCOSE BLD-MCNC: 123 MG/DL (ref 70–99)
GLUCOSE BLD-MCNC: 124 MG/DL (ref 70–99)
GLUCOSE BLD-MCNC: 129 MG/DL (ref 70–99)
GLUCOSE BLD-MCNC: 129 MG/DL (ref 70–99)
GLUCOSE BLD-MCNC: 153 MG/DL (ref 70–99)
GLUCOSE BLD-MCNC: 158 MG/DL (ref 70–99)
GLUCOSE BLD-MCNC: 197 MG/DL (ref 70–99)
GLUCOSE BLD-MCNC: 92 MG/DL (ref 70–99)
GLUCOSE BLD-MCNC: 96 MG/DL (ref 70–99)
GLUCOSE UR STRIP.AUTO-MCNC: NEGATIVE MG/DL
HAV IGM SER QL: 2.1 MG/DL (ref 1.6–2.6)
HAV IGM SER QL: 2.2 MG/DL (ref 1.6–2.6)
HAV IGM SER QL: 2.3 MG/DL (ref 1.6–2.6)
HAV IGM SER QL: 2.3 MG/DL (ref 1.6–2.6)
HAV IGM SER QL: 2.4 MG/DL (ref 1.6–2.6)
HCO3 BLDA-SCNC: 19.7 MEQ/L (ref 22–26)
HCT VFR BLD AUTO: 32.4 %
HCT VFR BLD AUTO: 33.1 %
HCT VFR BLD AUTO: 33.4 %
HCT VFR BLD AUTO: 34.1 %
HCT VFR BLD AUTO: 34.8 %
HCT VFR BLD AUTO: 35.4 %
HCT VFR BLD AUTO: 36.9 %
HCT VFR BLD AUTO: 37.8 %
HCT VFR BLD AUTO: 38 %
HCT VFR BLD AUTO: 39.6 %
HDLC SERPL-MCNC: 54 MG/DL (ref 40–59)
HGB BLD-MCNC: 10.2 G/DL
HGB BLD-MCNC: 10.2 G/DL
HGB BLD-MCNC: 10.3 G/DL
HGB BLD-MCNC: 10.5 G/DL
HGB BLD-MCNC: 10.7 G/DL
HGB BLD-MCNC: 11.1 G/DL
HGB BLD-MCNC: 11.4 G/DL
HGB BLD-MCNC: 11.6 G/DL
HGB BLD-MCNC: 11.8 G/DL
HGB BLD-MCNC: 11.9 G/DL
HGB BLD-MCNC: 12 G/DL
HYALINE CASTS #/AREA URNS AUTO: PRESENT /LPF
IMM GRANULOCYTES # BLD AUTO: 0.03 X10(3) UL (ref 0–1)
IMM GRANULOCYTES # BLD AUTO: 0.05 X10(3) UL (ref 0–1)
IMM GRANULOCYTES # BLD AUTO: 0.06 X10(3) UL (ref 0–1)
IMM GRANULOCYTES # BLD AUTO: 0.06 X10(3) UL (ref 0–1)
IMM GRANULOCYTES # BLD AUTO: 0.09 X10(3) UL (ref 0–1)
IMM GRANULOCYTES NFR BLD: 0.4 %
IMM GRANULOCYTES NFR BLD: 0.5 %
IMM GRANULOCYTES NFR BLD: 0.6 %
IMM GRANULOCYTES NFR BLD: 0.6 %
IMM GRANULOCYTES NFR BLD: 0.8 %
INDURATION (): 0 MM (ref 0–11)
INR BLD: 2.77 (ref 0.89–1.11)
INSP PRESSURE: 12 CM H2O
ISTAT ACTIVATED CLOTTING TIME: 191 SECONDS (ref 74–137)
ISTAT ACTIVATED CLOTTING TIME: 230 SECONDS (ref 74–137)
ISTAT ACTIVATED CLOTTING TIME: 268 SECONDS (ref 74–137)
KETONES UR STRIP.AUTO-MCNC: NEGATIVE MG/DL
LACTATE BLDA-SCNC: 3.7 MMOL/L (ref 0.5–2)
LACTATE SERPL-SCNC: 1.6 MMOL/L (ref 0.4–2)
LDLC SERPL CALC-MCNC: 46 MG/DL (ref ?–100)
LYMPHOCYTES # BLD AUTO: 0.64 X10(3) UL (ref 1–4)
LYMPHOCYTES # BLD AUTO: 0.7 X10(3) UL (ref 1–4)
LYMPHOCYTES # BLD AUTO: 0.73 X10(3) UL (ref 1–4)
LYMPHOCYTES # BLD AUTO: 0.73 X10(3) UL (ref 1–4)
LYMPHOCYTES # BLD AUTO: 0.89 X10(3) UL (ref 1–4)
LYMPHOCYTES NFR BLD AUTO: 5.6 %
LYMPHOCYTES NFR BLD AUTO: 7.6 %
LYMPHOCYTES NFR BLD AUTO: 7.8 %
LYMPHOCYTES NFR BLD AUTO: 9 %
LYMPHOCYTES NFR BLD AUTO: 9.2 %
M PROTEIN MFR SERPL ELPH: 6.6 G/DL (ref 6.4–8.2)
M PROTEIN MFR SERPL ELPH: 6.6 G/DL (ref 6.4–8.2)
M PROTEIN MFR SERPL ELPH: 6.8 G/DL (ref 6.4–8.2)
M PROTEIN MFR SERPL ELPH: 7.4 G/DL (ref 6.4–8.2)
M PROTEIN MFR SERPL ELPH: 7.5 G/DL (ref 6.4–8.2)
MCH RBC QN AUTO: 28.5 PG (ref 26–34)
MCH RBC QN AUTO: 28.6 PG (ref 26–34)
MCH RBC QN AUTO: 28.7 PG (ref 26–34)
MCH RBC QN AUTO: 28.8 PG (ref 26–34)
MCH RBC QN AUTO: 29 PG (ref 26–34)
MCH RBC QN AUTO: 29 PG (ref 26–34)
MCH RBC QN AUTO: 29.2 PG (ref 26–34)
MCH RBC QN AUTO: 29.2 PG (ref 26–34)
MCH RBC QN AUTO: 29.4 PG (ref 26–34)
MCH RBC QN AUTO: 29.4 PG (ref 26–34)
MCHC RBC AUTO-ENTMCNC: 30.1 G/DL (ref 31–37)
MCHC RBC AUTO-ENTMCNC: 30.2 G/DL (ref 31–37)
MCHC RBC AUTO-ENTMCNC: 30.5 G/DL (ref 31–37)
MCHC RBC AUTO-ENTMCNC: 30.7 G/DL (ref 31–37)
MCHC RBC AUTO-ENTMCNC: 30.8 G/DL (ref 31–37)
MCHC RBC AUTO-ENTMCNC: 31.4 G/DL (ref 31–37)
MCHC RBC AUTO-ENTMCNC: 31.4 G/DL (ref 31–37)
MCHC RBC AUTO-ENTMCNC: 31.6 G/DL (ref 31–37)
MCHC RBC AUTO-ENTMCNC: 31.8 G/DL (ref 31–37)
MCHC RBC AUTO-ENTMCNC: 32 G/DL (ref 31–37)
MCV RBC AUTO: 91.9 FL
MCV RBC AUTO: 92 FL
MCV RBC AUTO: 92.4 FL
MCV RBC AUTO: 92.5 FL
MCV RBC AUTO: 92.6 FL
MCV RBC AUTO: 93 FL
MCV RBC AUTO: 93.6 FL
MCV RBC AUTO: 94.5 FL
MCV RBC AUTO: 95 FL
MCV RBC AUTO: 96.7 FL
METHGB MFR BLD: 0.4 % SAT (ref 0.4–1.5)
MONOCYTES # BLD AUTO: 0.56 X10(3) UL (ref 0.1–1)
MONOCYTES # BLD AUTO: 0.56 X10(3) UL (ref 0.1–1)
MONOCYTES # BLD AUTO: 0.62 X10(3) UL (ref 0.1–1)
MONOCYTES # BLD AUTO: 0.63 X10(3) UL (ref 0.1–1)
MONOCYTES # BLD AUTO: 0.67 X10(3) UL (ref 0.1–1)
MONOCYTES NFR BLD AUTO: 5.5 %
MONOCYTES NFR BLD AUTO: 5.8 %
MONOCYTES NFR BLD AUTO: 6.7 %
MONOCYTES NFR BLD AUTO: 7 %
MONOCYTES NFR BLD AUTO: 7.2 %
NEUTROPHILS # BLD AUTO: 6.23 X10 (3) UL (ref 1.5–7.7)
NEUTROPHILS # BLD AUTO: 6.23 X10(3) UL (ref 1.5–7.7)
NEUTROPHILS # BLD AUTO: 7.6 X10 (3) UL (ref 1.5–7.7)
NEUTROPHILS # BLD AUTO: 7.6 X10(3) UL (ref 1.5–7.7)
NEUTROPHILS # BLD AUTO: 7.79 X10 (3) UL (ref 1.5–7.7)
NEUTROPHILS # BLD AUTO: 7.79 X10(3) UL (ref 1.5–7.7)
NEUTROPHILS # BLD AUTO: 7.85 X10 (3) UL (ref 1.5–7.7)
NEUTROPHILS # BLD AUTO: 7.85 X10(3) UL (ref 1.5–7.7)
NEUTROPHILS # BLD AUTO: 9.89 X10 (3) UL (ref 1.5–7.7)
NEUTROPHILS # BLD AUTO: 9.89 X10(3) UL (ref 1.5–7.7)
NEUTROPHILS NFR BLD AUTO: 79.9 %
NEUTROPHILS NFR BLD AUTO: 80.4 %
NEUTROPHILS NFR BLD AUTO: 81.6 %
NEUTROPHILS NFR BLD AUTO: 82.1 %
NEUTROPHILS NFR BLD AUTO: 86.1 %
NITRITE UR QL STRIP.AUTO: NEGATIVE
NONHDLC SERPL-MCNC: 67 MG/DL (ref ?–130)
NT-PROBNP SERPL-MCNC: 3095 PG/ML (ref ?–450)
NT-PROBNP SERPL-MCNC: 4629 PG/ML (ref ?–450)
NT-PROBNP SERPL-MCNC: 4879 PG/ML (ref ?–450)
NT-PROBNP SERPL-MCNC: 5164 PG/ML (ref ?–450)
NT-PROBNP SERPL-MCNC: 6733 PG/ML (ref ?–450)
NT-PROBNP SERPL-MCNC: 8498 PG/ML (ref ?–450)
NT-PROBNP SERPL-MCNC: 8884 PG/ML (ref ?–450)
OSMOLALITY SERPL CALC.SUM OF ELEC: 290 MOSM/KG (ref 275–295)
OSMOLALITY SERPL CALC.SUM OF ELEC: 291 MOSM/KG (ref 275–295)
OSMOLALITY SERPL CALC.SUM OF ELEC: 291 MOSM/KG (ref 275–295)
OSMOLALITY SERPL CALC.SUM OF ELEC: 292 MOSM/KG (ref 275–295)
OSMOLALITY SERPL CALC.SUM OF ELEC: 296 MOSM/KG (ref 275–295)
OSMOLALITY SERPL CALC.SUM OF ELEC: 296 MOSM/KG (ref 275–295)
OSMOLALITY SERPL CALC.SUM OF ELEC: 297 MOSM/KG (ref 275–295)
OSMOLALITY SERPL CALC.SUM OF ELEC: 298 MOSM/KG (ref 275–295)
OSMOLALITY SERPL CALC.SUM OF ELEC: 299 MOSM/KG (ref 275–295)
OSMOLALITY SERPL CALC.SUM OF ELEC: 300 MOSM/KG (ref 275–295)
OSMOLALITY SERPL CALC.SUM OF ELEC: 301 MOSM/KG (ref 275–295)
OSMOLALITY SERPL CALC.SUM OF ELEC: 302 MOSM/KG (ref 275–295)
OSMOLALITY SERPL CALC.SUM OF ELEC: 303 MOSM/KG (ref 275–295)
OSMOLALITY SERPL CALC.SUM OF ELEC: 305 MOSM/KG (ref 275–295)
OSMOLALITY SERPL CALC.SUM OF ELEC: 308 MOSM/KG (ref 275–295)
P/F RATIO: 320.2 MMHG
PATIENT FASTING Y/N/NP: NO
PATIENT FASTING Y/N/NP: YES
PCO2 BLDA: 30 MM HG (ref 35–45)
PH BLDA: 7.43 [PH] (ref 7.35–7.45)
PH UR STRIP.AUTO: 5 [PH] (ref 5–8)
PH UR STRIP.AUTO: 6 [PH] (ref 5–8)
PLATELET # BLD AUTO: 228 10(3)UL (ref 150–450)
PLATELET # BLD AUTO: 240 10(3)UL (ref 150–450)
PLATELET # BLD AUTO: 242 10(3)UL (ref 150–450)
PLATELET # BLD AUTO: 248 10(3)UL (ref 150–450)
PLATELET # BLD AUTO: 252 10(3)UL (ref 150–450)
PLATELET # BLD AUTO: 275 10(3)UL (ref 150–450)
PLATELET # BLD AUTO: 277 10(3)UL (ref 150–450)
PLATELET # BLD AUTO: 285 10(3)UL (ref 150–450)
PLATELET # BLD AUTO: 286 10(3)UL (ref 150–450)
PLATELET # BLD AUTO: 298 10(3)UL (ref 150–450)
PO2 BLDA: 95 MM HG (ref 80–105)
POTASSIUM BLD-SCNC: 5.7 MMOL/L (ref 3.6–5.1)
POTASSIUM SERPL-SCNC: 3.1 MMOL/L (ref 3.5–5.1)
POTASSIUM SERPL-SCNC: 3.3 MMOL/L (ref 3.5–5.1)
POTASSIUM SERPL-SCNC: 3.5 MMOL/L (ref 3.5–5.1)
POTASSIUM SERPL-SCNC: 3.6 MMOL/L (ref 3.5–5.1)
POTASSIUM SERPL-SCNC: 3.7 MMOL/L (ref 3.5–5.1)
POTASSIUM SERPL-SCNC: 3.8 MMOL/L (ref 3.5–5.1)
POTASSIUM SERPL-SCNC: 3.9 MMOL/L (ref 3.5–5.1)
POTASSIUM SERPL-SCNC: 4 MMOL/L (ref 3.5–5.1)
POTASSIUM SERPL-SCNC: 4 MMOL/L (ref 3.5–5.1)
POTASSIUM SERPL-SCNC: 4.1 MMOL/L (ref 3.5–5.1)
POTASSIUM SERPL-SCNC: 4.2 MMOL/L (ref 3.5–5.1)
POTASSIUM SERPL-SCNC: 4.3 MMOL/L (ref 3.5–5.1)
POTASSIUM SERPL-SCNC: 4.3 MMOL/L (ref 3.5–5.1)
POTASSIUM SERPL-SCNC: 4.9 MMOL/L (ref 3.5–5.1)
POTASSIUM SERPL-SCNC: 5.3 MMOL/L (ref 3.5–5.1)
POTASSIUM SERPL-SCNC: 5.4 MMOL/L (ref 3.5–5.1)
POTASSIUM SERPL-SCNC: 5.7 MMOL/L (ref 3.5–5.1)
PROCALCITONIN SERPL-MCNC: <0.05 NG/ML (ref ?–0.16)
PROT UR STRIP.AUTO-MCNC: 100 MG/DL
PROT UR STRIP.AUTO-MCNC: 30 MG/DL
PROT UR STRIP.AUTO-MCNC: NEGATIVE MG/DL
PROT UR STRIP.AUTO-MCNC: NEGATIVE MG/DL
PSA SERPL DL<=0.01 NG/ML-MCNC: 29.9 SECONDS (ref 12.2–14.5)
Q-T INTERVAL: 344 MS
Q-T INTERVAL: 362 MS
QRS DURATION: 100 MS
QRS DURATION: 98 MS
QTC CALCULATION (BEZET): 440 MS
QTC CALCULATION (BEZET): 465 MS
R AXIS: -47 DEGREES
R AXIS: -48 DEGREES
RBC # BLD AUTO: 3.5 X10(6)UL
RBC # BLD AUTO: 3.56 X10(6)UL
RBC # BLD AUTO: 3.57 X10(6)UL
RBC # BLD AUTO: 3.6 X10(6)UL
RBC # BLD AUTO: 3.69 X10(6)UL
RBC # BLD AUTO: 3.85 X10(6)UL
RBC # BLD AUTO: 4 X10(6)UL
RBC # BLD AUTO: 4.01 X10(6)UL
RBC # BLD AUTO: 4.11 X10(6)UL
RBC # BLD AUTO: 4.17 X10(6)UL
RBC #/AREA URNS AUTO: >10 /HPF
RBC #/AREA URNS AUTO: >10 /HPF
RBC UR QL AUTO: NEGATIVE
RH BLOOD TYPE: POSITIVE
SAO2 % BLDA FROM PO2: 98 % (ref 92–100)
SAO2 % BLDA: 96 % (ref 92–100)
SARS-COV-2 RNA RESP QL NAA+PROBE: NOT DETECTED
SODIUM BLD-SCNC: 134 MMOL/L (ref 136–144)
SODIUM SERPL-SCNC: 132 MMOL/L (ref 136–145)
SODIUM SERPL-SCNC: 133 MMOL/L (ref 136–145)
SODIUM SERPL-SCNC: 133 MMOL/L (ref 136–145)
SODIUM SERPL-SCNC: 135 MMOL/L (ref 136–145)
SODIUM SERPL-SCNC: 135 MMOL/L (ref 136–145)
SODIUM SERPL-SCNC: 136 MMOL/L (ref 136–145)
SODIUM SERPL-SCNC: 137 MMOL/L (ref 136–145)
SODIUM SERPL-SCNC: 138 MMOL/L (ref 136–145)
SODIUM SERPL-SCNC: 139 MMOL/L (ref 136–145)
SODIUM SERPL-SCNC: 139 MMOL/L (ref 136–145)
SODIUM SERPL-SCNC: 140 MMOL/L (ref 136–145)
SODIUM SERPL-SCNC: 140 MMOL/L (ref 136–145)
SODIUM SERPL-SCNC: 142 MMOL/L (ref 136–145)
SODIUM SERPL-SCNC: 34 MMOL/L
SODIUM SERPL-SCNC: 37 MMOL/L
SP GR UR STRIP.AUTO: 1.01 (ref 1–1.03)
T AXIS: 51 DEGREES
T AXIS: 80 DEGREES
TRIGL SERPL-MCNC: 117 MG/DL (ref 30–149)
TROPONIN I SERPL-MCNC: <0.045 NG/ML (ref ?–0.04)
URATE SERPL-MCNC: 14.9 MG/DL
URATE SERPL-MCNC: 2.2 MG/DL
UROBILINOGEN UR STRIP.AUTO-MCNC: <2 MG/DL
VENT RATE: 14 /MIN
VENTRICULAR RATE: 110 BPM
VENTRICULAR RATE: 89 BPM
VIT D+METAB SERPL-MCNC: 89.3 NG/ML (ref 30–100)
VLDLC SERPL CALC-MCNC: 16 MG/DL (ref 0–30)
WBC # BLD AUTO: 10 X10(3) UL (ref 4–11)
WBC # BLD AUTO: 11.1 X10(3) UL (ref 4–11)
WBC # BLD AUTO: 11.5 X10(3) UL (ref 4–11)
WBC # BLD AUTO: 7.8 X10(3) UL (ref 4–11)
WBC # BLD AUTO: 9.2 X10(3) UL (ref 4–11)
WBC # BLD AUTO: 9.2 X10(3) UL (ref 4–11)
WBC # BLD AUTO: 9.3 X10(3) UL (ref 4–11)
WBC # BLD AUTO: 9.6 X10(3) UL (ref 4–11)
WBC # BLD AUTO: 9.6 X10(3) UL (ref 4–11)
WBC # BLD AUTO: 9.7 X10(3) UL (ref 4–11)
WBC #/AREA URNS AUTO: >50 /HPF
WBC CLUMPS UR QL AUTO: PRESENT /HPF

## 2021-01-01 PROCEDURE — 99232 SBSQ HOSP IP/OBS MODERATE 35: CPT | Performed by: INTERNAL MEDICINE

## 2021-01-01 PROCEDURE — 99232 SBSQ HOSP IP/OBS MODERATE 35: CPT | Performed by: HOSPITALIST

## 2021-01-01 PROCEDURE — 93306 TTE W/DOPPLER COMPLETE: CPT | Performed by: NURSE PRACTITIONER

## 2021-01-01 PROCEDURE — 86900 BLOOD TYPING SEROLOGIC ABO: CPT

## 2021-01-01 PROCEDURE — 99309 SBSQ NF CARE MODERATE MDM 30: CPT | Performed by: NURSE PRACTITIONER

## 2021-01-01 PROCEDURE — 86580 TB INTRADERMAL TEST: CPT | Performed by: INTERNAL MEDICINE

## 2021-01-01 PROCEDURE — 99233 SBSQ HOSP IP/OBS HIGH 50: CPT | Performed by: INTERNAL MEDICINE

## 2021-01-01 PROCEDURE — 87077 CULTURE AEROBIC IDENTIFY: CPT

## 2021-01-01 PROCEDURE — 43239 EGD BIOPSY SINGLE/MULTIPLE: CPT | Performed by: INTERNAL MEDICINE

## 2021-01-01 PROCEDURE — 87086 URINE CULTURE/COLONY COUNT: CPT

## 2021-01-01 PROCEDURE — 99232 SBSQ HOSP IP/OBS MODERATE 35: CPT | Performed by: OTHER

## 2021-01-01 PROCEDURE — 73630 X-RAY EXAM OF FOOT: CPT | Performed by: INTERNAL MEDICINE

## 2021-01-01 PROCEDURE — 02UG3JZ SUPPLEMENT MITRAL VALVE WITH SYNTHETIC SUBSTITUTE, PERCUTANEOUS APPROACH: ICD-10-PCS | Performed by: INTERNAL MEDICINE

## 2021-01-01 PROCEDURE — 71045 X-RAY EXAM CHEST 1 VIEW: CPT | Performed by: INTERNAL MEDICINE

## 2021-01-01 PROCEDURE — 93306 TTE W/DOPPLER COMPLETE: CPT | Performed by: INTERNAL MEDICINE

## 2021-01-01 PROCEDURE — 85027 COMPLETE CBC AUTOMATED: CPT

## 2021-01-01 PROCEDURE — 93355 ECHO TRANSESOPHAGEAL (TEE): CPT | Performed by: INTERNAL MEDICINE

## 2021-01-01 PROCEDURE — 99214 OFFICE O/P EST MOD 30 MIN: CPT | Performed by: INTERNAL MEDICINE

## 2021-01-01 PROCEDURE — 86901 BLOOD TYPING SEROLOGIC RH(D): CPT

## 2021-01-01 PROCEDURE — 0DB38ZX EXCISION OF LOWER ESOPHAGUS, VIA NATURAL OR ARTIFICIAL OPENING ENDOSCOPIC, DIAGNOSTIC: ICD-10-PCS | Performed by: INTERNAL MEDICINE

## 2021-01-01 PROCEDURE — 80048 BASIC METABOLIC PNL TOTAL CA: CPT | Performed by: NURSE PRACTITIONER

## 2021-01-01 PROCEDURE — 86850 RBC ANTIBODY SCREEN: CPT

## 2021-01-01 PROCEDURE — 99233 SBSQ HOSP IP/OBS HIGH 50: CPT | Performed by: HOSPITALIST

## 2021-01-01 PROCEDURE — 93010 ELECTROCARDIOGRAM REPORT: CPT | Performed by: INTERNAL MEDICINE

## 2021-01-01 PROCEDURE — 83735 ASSAY OF MAGNESIUM: CPT

## 2021-01-01 PROCEDURE — 76942 ECHO GUIDE FOR BIOPSY: CPT | Performed by: ANESTHESIOLOGY

## 2021-01-01 PROCEDURE — 80053 COMPREHEN METABOLIC PANEL: CPT

## 2021-01-01 PROCEDURE — 71045 X-RAY EXAM CHEST 1 VIEW: CPT | Performed by: EMERGENCY MEDICINE

## 2021-01-01 PROCEDURE — 99239 HOSP IP/OBS DSCHRG MGMT >30: CPT | Performed by: HOSPITALIST

## 2021-01-01 PROCEDURE — 84550 ASSAY OF BLOOD/URIC ACID: CPT

## 2021-01-01 PROCEDURE — 71250 CT THORAX DX C-: CPT | Performed by: INTERNAL MEDICINE

## 2021-01-01 PROCEDURE — 85025 COMPLETE CBC W/AUTO DIFF WBC: CPT

## 2021-01-01 PROCEDURE — 99212 OFFICE O/P EST SF 10 MIN: CPT

## 2021-01-01 PROCEDURE — 82103 ALPHA-1-ANTITRYPSIN TOTAL: CPT

## 2021-01-01 PROCEDURE — 84460 ALANINE AMINO (ALT) (SGPT): CPT

## 2021-01-01 PROCEDURE — 82306 VITAMIN D 25 HYDROXY: CPT

## 2021-01-01 PROCEDURE — 99306 1ST NF CARE HIGH MDM 50: CPT | Performed by: FAMILY MEDICINE

## 2021-01-01 PROCEDURE — 36415 COLL VENOUS BLD VENIPUNCTURE: CPT | Performed by: NURSE PRACTITIONER

## 2021-01-01 PROCEDURE — 1124F ACP DISCUSS-NO DSCNMKR DOCD: CPT | Performed by: NURSE PRACTITIONER

## 2021-01-01 PROCEDURE — 81001 URINALYSIS AUTO W/SCOPE: CPT

## 2021-01-01 PROCEDURE — 82104 ALPHA-1-ANTITRYPSIN PHENO: CPT

## 2021-01-01 PROCEDURE — 71045 X-RAY EXAM CHEST 1 VIEW: CPT | Performed by: NURSE PRACTITIONER

## 2021-01-01 PROCEDURE — 99223 1ST HOSP IP/OBS HIGH 75: CPT | Performed by: INTERNAL MEDICINE

## 2021-01-01 PROCEDURE — 0DB78ZX EXCISION OF STOMACH, PYLORUS, VIA NATURAL OR ARTIFICIAL OPENING ENDOSCOPIC, DIAGNOSTIC: ICD-10-PCS | Performed by: INTERNAL MEDICINE

## 2021-01-01 PROCEDURE — 90792 PSYCH DIAG EVAL W/MED SRVCS: CPT | Performed by: OTHER

## 2021-01-01 PROCEDURE — 84450 TRANSFERASE (AST) (SGOT): CPT

## 2021-01-01 PROCEDURE — 99214 OFFICE O/P EST MOD 30 MIN: CPT | Performed by: NURSE PRACTITIONER

## 2021-01-01 PROCEDURE — B519ZZZ FLUOROSCOPY OF INFERIOR VENA CAVA: ICD-10-PCS | Performed by: INTERNAL MEDICINE

## 2021-01-01 PROCEDURE — 99310 SBSQ NF CARE HIGH MDM 45: CPT | Performed by: NURSE PRACTITIONER

## 2021-01-01 PROCEDURE — 36415 COLL VENOUS BLD VENIPUNCTURE: CPT

## 2021-01-01 PROCEDURE — 83880 ASSAY OF NATRIURETIC PEPTIDE: CPT

## 2021-01-01 PROCEDURE — 1111F DSCHRG MED/CURRENT MED MERGE: CPT | Performed by: FAMILY MEDICINE

## 2021-01-01 PROCEDURE — 93005 ELECTROCARDIOGRAM TRACING: CPT

## 2021-01-01 PROCEDURE — 0012A SARSCOV2 VAC 100MCG/0.5ML IM: CPT

## 2021-01-01 PROCEDURE — 93971 EXTREMITY STUDY: CPT | Performed by: EMERGENCY MEDICINE

## 2021-01-01 PROCEDURE — 80048 BASIC METABOLIC PNL TOTAL CA: CPT

## 2021-01-01 PROCEDURE — B245ZZ4 ULTRASONOGRAPHY OF LEFT HEART, TRANSESOPHAGEAL: ICD-10-PCS | Performed by: INTERNAL MEDICINE

## 2021-01-01 PROCEDURE — 76770 US EXAM ABDO BACK WALL COMP: CPT | Performed by: INTERNAL MEDICINE

## 2021-01-01 PROCEDURE — 0011A SARSCOV2 VAC 100MCG/0.5ML IM: CPT

## 2021-01-01 PROCEDURE — 93308 TTE F-UP OR LMTD: CPT | Performed by: NURSE PRACTITIONER

## 2021-01-01 PROCEDURE — 80061 LIPID PANEL: CPT

## 2021-01-01 PROCEDURE — 0DB98ZX EXCISION OF DUODENUM, VIA NATURAL OR ARTIFICIAL OPENING ENDOSCOPIC, DIAGNOSTIC: ICD-10-PCS | Performed by: INTERNAL MEDICINE

## 2021-01-01 RX ORDER — HYDROCODONE BITARTRATE AND ACETAMINOPHEN 5; 325 MG/1; MG/1
1 TABLET ORAL AS NEEDED
Status: DISCONTINUED | OUTPATIENT
Start: 2021-01-01 | End: 2021-01-01

## 2021-01-01 RX ORDER — SODIUM CHLORIDE 9 MG/ML
INJECTION, SOLUTION INTRAVENOUS CONTINUOUS
Status: DISCONTINUED | OUTPATIENT
Start: 2021-01-01 | End: 2021-01-01

## 2021-01-01 RX ORDER — SODIUM CHLORIDE 30 MG/ML INHALATION SOLUTION 30 MG/ML
3 SOLUTION INHALANT
Status: DISCONTINUED | OUTPATIENT
Start: 2021-01-01 | End: 2021-01-01

## 2021-01-01 RX ORDER — ALPRAZOLAM 0.25 MG/1
0.25 TABLET ORAL 3 TIMES DAILY
Qty: 40 TABLET | Refills: 1 | Status: SHIPPED | OUTPATIENT
Start: 2021-01-01

## 2021-01-01 RX ORDER — METOPROLOL TARTRATE 50 MG/1
50 TABLET, FILM COATED ORAL 2 TIMES DAILY
Qty: 180 TABLET | Refills: 3 | Status: ON HOLD | OUTPATIENT
Start: 2021-01-01 | End: 2021-01-01

## 2021-01-01 RX ORDER — FUROSEMIDE 10 MG/ML
40 INJECTION INTRAMUSCULAR; INTRAVENOUS
Status: DISCONTINUED | OUTPATIENT
Start: 2021-01-01 | End: 2021-01-01

## 2021-01-01 RX ORDER — ACETAMINOPHEN 325 MG/1
650 TABLET ORAL EVERY 6 HOURS PRN
Status: DISCONTINUED | OUTPATIENT
Start: 2021-01-01 | End: 2021-01-01

## 2021-01-01 RX ORDER — HYDROCODONE BITARTRATE AND ACETAMINOPHEN 5; 325 MG/1; MG/1
1 TABLET ORAL AS NEEDED
Status: CANCELLED | OUTPATIENT
Start: 2021-01-01

## 2021-01-01 RX ORDER — HYDROMORPHONE HYDROCHLORIDE 1 MG/ML
0.2 INJECTION, SOLUTION INTRAMUSCULAR; INTRAVENOUS; SUBCUTANEOUS EVERY 5 MIN PRN
Status: CANCELLED | OUTPATIENT
Start: 2021-01-01

## 2021-01-01 RX ORDER — FUROSEMIDE 40 MG/1
40 TABLET ORAL DAILY
Status: DISCONTINUED | OUTPATIENT
Start: 2021-01-01 | End: 2021-01-01

## 2021-01-01 RX ORDER — BUDESONIDE 0.25 MG/2ML
0.5 INHALANT ORAL
Status: DISCONTINUED | OUTPATIENT
Start: 2021-01-01 | End: 2021-01-01

## 2021-01-01 RX ORDER — FLUTICASONE PROPIONATE AND SALMETEROL 50; 250 UG/1; UG/1
1 POWDER RESPIRATORY (INHALATION) 2 TIMES DAILY
Qty: 1 EACH | Refills: 1 | Status: SHIPPED | OUTPATIENT
Start: 2021-01-01

## 2021-01-01 RX ORDER — FUROSEMIDE 40 MG/1
40 TABLET ORAL EVERY OTHER DAY
Qty: 45 TABLET | Refills: 1 | Status: SHIPPED | OUTPATIENT
Start: 2021-01-01 | End: 2021-01-01

## 2021-01-01 RX ORDER — HYDROCODONE BITARTRATE AND ACETAMINOPHEN 5; 325 MG/1; MG/1
2 TABLET ORAL AS NEEDED
Status: CANCELLED | OUTPATIENT
Start: 2021-01-01

## 2021-01-01 RX ORDER — ONDANSETRON 2 MG/ML
INJECTION INTRAMUSCULAR; INTRAVENOUS AS NEEDED
Status: DISCONTINUED | OUTPATIENT
Start: 2021-01-01 | End: 2021-01-01 | Stop reason: SURG

## 2021-01-01 RX ORDER — IPRATROPIUM BROMIDE AND ALBUTEROL SULFATE 2.5; .5 MG/3ML; MG/3ML
3 SOLUTION RESPIRATORY (INHALATION) EVERY 6 HOURS PRN
Status: DISCONTINUED | OUTPATIENT
Start: 2021-01-01 | End: 2021-01-01

## 2021-01-01 RX ORDER — MORPHINE SULFATE 4 MG/ML
4 INJECTION, SOLUTION INTRAMUSCULAR; INTRAVENOUS EVERY 10 MIN PRN
Status: CANCELLED | OUTPATIENT
Start: 2021-01-01

## 2021-01-01 RX ORDER — BISACODYL 10 MG
10 SUPPOSITORY, RECTAL RECTAL
Status: DISCONTINUED | OUTPATIENT
Start: 2021-01-01 | End: 2021-01-01

## 2021-01-01 RX ORDER — TORSEMIDE 20 MG/1
20 TABLET ORAL DAILY
Qty: 30 TABLET | Refills: 3 | Status: SHIPPED | OUTPATIENT
Start: 2021-01-01

## 2021-01-01 RX ORDER — HYDROMORPHONE HYDROCHLORIDE 1 MG/ML
0.4 INJECTION, SOLUTION INTRAMUSCULAR; INTRAVENOUS; SUBCUTANEOUS EVERY 5 MIN PRN
Status: CANCELLED | OUTPATIENT
Start: 2021-01-01

## 2021-01-01 RX ORDER — DILTIAZEM HYDROCHLORIDE 120 MG/1
120 CAPSULE, EXTENDED RELEASE ORAL 2 TIMES DAILY
Status: DISCONTINUED | OUTPATIENT
Start: 2021-01-01 | End: 2021-01-01

## 2021-01-01 RX ORDER — PROCHLORPERAZINE EDISYLATE 5 MG/ML
5 INJECTION INTRAMUSCULAR; INTRAVENOUS ONCE AS NEEDED
Status: CANCELLED | OUTPATIENT
Start: 2021-01-01 | End: 2021-01-01

## 2021-01-01 RX ORDER — HEPARIN SODIUM 1000 [USP'U]/ML
INJECTION, SOLUTION INTRAVENOUS; SUBCUTANEOUS AS NEEDED
Status: DISCONTINUED | OUTPATIENT
Start: 2021-01-01 | End: 2021-01-01 | Stop reason: SURG

## 2021-01-01 RX ORDER — FUROSEMIDE 10 MG/ML
40 INJECTION INTRAMUSCULAR; INTRAVENOUS ONCE
Status: COMPLETED | OUTPATIENT
Start: 2021-01-01 | End: 2021-01-01

## 2021-01-01 RX ORDER — MELATONIN
3 NIGHTLY PRN
Status: DISCONTINUED | OUTPATIENT
Start: 2021-01-01 | End: 2021-01-01

## 2021-01-01 RX ORDER — NALOXONE HYDROCHLORIDE 0.4 MG/ML
80 INJECTION, SOLUTION INTRAMUSCULAR; INTRAVENOUS; SUBCUTANEOUS AS NEEDED
Status: CANCELLED | OUTPATIENT
Start: 2021-01-01 | End: 2021-01-01

## 2021-01-01 RX ORDER — METOCLOPRAMIDE HYDROCHLORIDE 5 MG/ML
10 INJECTION INTRAMUSCULAR; INTRAVENOUS AS NEEDED
Status: DISCONTINUED | OUTPATIENT
Start: 2021-01-01 | End: 2021-01-01

## 2021-01-01 RX ORDER — SPIRONOLACTONE 25 MG/1
25 TABLET ORAL DAILY PRN
Status: DISCONTINUED | OUTPATIENT
Start: 2021-01-01 | End: 2021-01-01

## 2021-01-01 RX ORDER — DILTIAZEM HYDROCHLORIDE 5 MG/ML
5 INJECTION INTRAVENOUS ONCE
Status: DISCONTINUED | OUTPATIENT
Start: 2021-01-01 | End: 2021-01-01

## 2021-01-01 RX ORDER — HYDROCODONE BITARTRATE AND ACETAMINOPHEN 5; 325 MG/1; MG/1
2 TABLET ORAL AS NEEDED
Status: DISCONTINUED | OUTPATIENT
Start: 2021-01-01 | End: 2021-01-01

## 2021-01-01 RX ORDER — IPRATROPIUM BROMIDE AND ALBUTEROL SULFATE 2.5; .5 MG/3ML; MG/3ML
SOLUTION RESPIRATORY (INHALATION)
Status: COMPLETED
Start: 2021-01-01 | End: 2021-01-01

## 2021-01-01 RX ORDER — FLUTICASONE PROPIONATE AND SALMETEROL 250; 50 UG/1; UG/1
1 POWDER RESPIRATORY (INHALATION) 2 TIMES DAILY
Status: ON HOLD | COMMUNITY
End: 2021-01-01

## 2021-01-01 RX ORDER — ACETAMINOPHEN 500 MG
1000 TABLET ORAL ONCE AS NEEDED
Status: ACTIVE | OUTPATIENT
Start: 2021-01-01 | End: 2021-01-01

## 2021-01-01 RX ORDER — METHYLPREDNISOLONE 4 MG/1
TABLET ORAL
Qty: 1 EACH | Refills: 0 | Status: SHIPPED | OUTPATIENT
Start: 2021-01-01 | End: 2021-01-01

## 2021-01-01 RX ORDER — FUROSEMIDE 10 MG/ML
80 INJECTION INTRAMUSCULAR; INTRAVENOUS
Status: DISCONTINUED | OUTPATIENT
Start: 2021-01-01 | End: 2021-01-01

## 2021-01-01 RX ORDER — METOPROLOL TARTRATE 50 MG/1
50 TABLET, FILM COATED ORAL
Status: DISCONTINUED | OUTPATIENT
Start: 2021-01-01 | End: 2021-01-01

## 2021-01-01 RX ORDER — PANTOPRAZOLE SODIUM 40 MG/1
40 TABLET, DELAYED RELEASE ORAL
Status: DISCONTINUED | OUTPATIENT
Start: 2021-01-01 | End: 2021-01-01

## 2021-01-01 RX ORDER — SODIUM CHLORIDE, SODIUM LACTATE, POTASSIUM CHLORIDE, CALCIUM CHLORIDE 600; 310; 30; 20 MG/100ML; MG/100ML; MG/100ML; MG/100ML
INJECTION, SOLUTION INTRAVENOUS CONTINUOUS
Status: CANCELLED | OUTPATIENT
Start: 2021-01-01

## 2021-01-01 RX ORDER — ONDANSETRON 2 MG/ML
4 INJECTION INTRAMUSCULAR; INTRAVENOUS ONCE AS NEEDED
Status: CANCELLED | OUTPATIENT
Start: 2021-01-01 | End: 2021-01-01

## 2021-01-01 RX ORDER — FUROSEMIDE 40 MG/1
40 TABLET ORAL DAILY
Qty: 90 TABLET | Refills: 3 | Status: ON HOLD | OUTPATIENT
Start: 2021-01-01 | End: 2021-01-01

## 2021-01-01 RX ORDER — LIDOCAINE HYDROCHLORIDE 10 MG/ML
INJECTION, SOLUTION EPIDURAL; INFILTRATION; INTRACAUDAL; PERINEURAL AS NEEDED
Status: DISCONTINUED | OUTPATIENT
Start: 2021-01-01 | End: 2021-01-01 | Stop reason: SURG

## 2021-01-01 RX ORDER — IPRATROPIUM BROMIDE AND ALBUTEROL SULFATE 2.5; .5 MG/3ML; MG/3ML
3 SOLUTION RESPIRATORY (INHALATION)
Status: DISCONTINUED | OUTPATIENT
Start: 2021-01-01 | End: 2021-01-01

## 2021-01-01 RX ORDER — FUROSEMIDE 10 MG/ML
INJECTION INTRAMUSCULAR; INTRAVENOUS
Status: COMPLETED
Start: 2021-01-01 | End: 2021-01-01

## 2021-01-01 RX ORDER — MORPHINE SULFATE 10 MG/ML
6 INJECTION, SOLUTION INTRAMUSCULAR; INTRAVENOUS EVERY 10 MIN PRN
Status: CANCELLED | OUTPATIENT
Start: 2021-01-01

## 2021-01-01 RX ORDER — FUROSEMIDE 10 MG/ML
80 INJECTION INTRAMUSCULAR; INTRAVENOUS ONCE
Status: COMPLETED | OUTPATIENT
Start: 2021-01-01 | End: 2021-01-01

## 2021-01-01 RX ORDER — LEVOFLOXACIN 750 MG/1
750 TABLET ORAL ONCE
Status: COMPLETED | OUTPATIENT
Start: 2021-01-01 | End: 2021-01-01

## 2021-01-01 RX ORDER — POTASSIUM CHLORIDE 20 MEQ/1
40 TABLET, EXTENDED RELEASE ORAL EVERY 4 HOURS
Status: DISPENSED | OUTPATIENT
Start: 2021-01-01 | End: 2021-01-01

## 2021-01-01 RX ORDER — ALPRAZOLAM 0.5 MG/1
0.25 TABLET ORAL EVERY MORNING
Status: DISCONTINUED | OUTPATIENT
Start: 2021-01-01 | End: 2021-01-01

## 2021-01-01 RX ORDER — ROCURONIUM BROMIDE 10 MG/ML
INJECTION, SOLUTION INTRAVENOUS AS NEEDED
Status: DISCONTINUED | OUTPATIENT
Start: 2021-01-01 | End: 2021-01-01 | Stop reason: SURG

## 2021-01-01 RX ORDER — LEVOFLOXACIN 750 MG/1
750 TABLET ORAL
Status: DISCONTINUED | OUTPATIENT
Start: 2021-01-01 | End: 2021-01-01

## 2021-01-01 RX ORDER — CLOTRIMAZOLE AND BETAMETHASONE DIPROPIONATE 10; .64 MG/G; MG/G
1 CREAM TOPICAL 2 TIMES DAILY PRN
Qty: 60 G | Refills: 3 | Status: SHIPPED | OUTPATIENT
Start: 2021-01-01 | End: 2021-01-01

## 2021-01-01 RX ORDER — DILTIAZEM HYDROCHLORIDE 5 MG/ML
INJECTION INTRAVENOUS AS NEEDED
Status: DISCONTINUED | OUTPATIENT
Start: 2021-01-01 | End: 2021-01-01 | Stop reason: SURG

## 2021-01-01 RX ORDER — METOPROLOL TARTRATE 50 MG/1
50 TABLET, FILM COATED ORAL 2 TIMES DAILY
Status: DISCONTINUED | OUTPATIENT
Start: 2021-01-01 | End: 2021-01-01 | Stop reason: SDUPTHER

## 2021-01-01 RX ORDER — EPLERENONE 25 MG/1
25 TABLET, FILM COATED ORAL DAILY
Qty: 30 TABLET | Refills: 3 | Status: SHIPPED | OUTPATIENT
Start: 2021-01-01

## 2021-01-01 RX ORDER — IPRATROPIUM BROMIDE AND ALBUTEROL SULFATE 2.5; .5 MG/3ML; MG/3ML
3 SOLUTION RESPIRATORY (INHALATION)
Qty: 30 EACH | Refills: 0 | Status: SHIPPED | OUTPATIENT
Start: 2021-01-01

## 2021-01-01 RX ORDER — CIPROFLOXACIN 2 MG/ML
400 INJECTION, SOLUTION INTRAVENOUS DAILY
Status: DISCONTINUED | OUTPATIENT
Start: 2021-01-01 | End: 2021-01-01

## 2021-01-01 RX ORDER — FUROSEMIDE 20 MG/1
20 TABLET ORAL EVERY OTHER DAY
Qty: 45 TABLET | Refills: 1 | Status: SHIPPED | OUTPATIENT
Start: 2021-01-01 | End: 2021-01-01

## 2021-01-01 RX ORDER — ALPRAZOLAM 0.5 MG/1
0.25 TABLET ORAL 3 TIMES DAILY
Status: DISCONTINUED | OUTPATIENT
Start: 2021-01-01 | End: 2021-01-01

## 2021-01-01 RX ORDER — METOLAZONE 2.5 MG/1
2.5 TABLET ORAL DAILY PRN
Qty: 90 TABLET | Refills: 3 | Status: ON HOLD | OUTPATIENT
Start: 2021-01-01 | End: 2021-01-01

## 2021-01-01 RX ORDER — FUROSEMIDE 10 MG/ML
20 INJECTION INTRAMUSCULAR; INTRAVENOUS ONCE
Status: COMPLETED | OUTPATIENT
Start: 2021-01-01 | End: 2021-01-01

## 2021-01-01 RX ORDER — MORPHINE SULFATE 4 MG/ML
2 INJECTION, SOLUTION INTRAMUSCULAR; INTRAVENOUS EVERY 10 MIN PRN
Status: CANCELLED | OUTPATIENT
Start: 2021-01-01

## 2021-01-01 RX ORDER — POTASSIUM CHLORIDE 20 MEQ/1
40 TABLET, EXTENDED RELEASE ORAL EVERY 4 HOURS
Status: COMPLETED | OUTPATIENT
Start: 2021-01-01 | End: 2021-01-01

## 2021-01-01 RX ORDER — METOPROLOL TARTRATE 50 MG/1
50 TABLET, FILM COATED ORAL 2 TIMES DAILY
Status: DISCONTINUED | OUTPATIENT
Start: 2021-01-01 | End: 2021-01-01

## 2021-01-01 RX ORDER — PRAVASTATIN SODIUM 20 MG
20 TABLET ORAL NIGHTLY
Status: DISCONTINUED | OUTPATIENT
Start: 2021-01-01 | End: 2021-01-01

## 2021-01-01 RX ORDER — ALLOPURINOL 100 MG/1
100 TABLET ORAL DAILY
Status: ON HOLD | COMMUNITY
Start: 2021-01-01 | End: 2021-01-01

## 2021-01-01 RX ORDER — EPLERENONE 25 MG/1
25 TABLET, FILM COATED ORAL DAILY
Status: DISCONTINUED | OUTPATIENT
Start: 2021-01-01 | End: 2021-01-01

## 2021-01-01 RX ORDER — TORSEMIDE 20 MG/1
20 TABLET ORAL DAILY
Status: DISCONTINUED | OUTPATIENT
Start: 2021-01-01 | End: 2021-01-01

## 2021-01-01 RX ORDER — ONDANSETRON 2 MG/ML
4 INJECTION INTRAMUSCULAR; INTRAVENOUS EVERY 6 HOURS PRN
Status: DISCONTINUED | OUTPATIENT
Start: 2021-01-01 | End: 2021-01-01

## 2021-01-01 RX ORDER — POTASSIUM CHLORIDE 20 MEQ/1
40 TABLET, EXTENDED RELEASE ORAL ONCE
Status: COMPLETED | OUTPATIENT
Start: 2021-01-01 | End: 2021-01-01

## 2021-01-01 RX ORDER — HEPARIN SODIUM 5000 [USP'U]/ML
INJECTION, SOLUTION INTRAVENOUS; SUBCUTANEOUS
Status: COMPLETED
Start: 2021-01-01 | End: 2021-01-01

## 2021-01-01 RX ORDER — POLYETHYLENE GLYCOL 3350 17 G/17G
17 POWDER, FOR SOLUTION ORAL DAILY PRN
Status: DISCONTINUED | OUTPATIENT
Start: 2021-01-01 | End: 2021-01-01

## 2021-01-01 RX ORDER — LIDOCAINE HYDROCHLORIDE 20 MG/ML
SOLUTION ORAL; TOPICAL
Qty: 45 TABLET | Refills: 0 | Status: SHIPPED | OUTPATIENT
Start: 2021-01-01

## 2021-01-01 RX ORDER — NALOXONE HYDROCHLORIDE 0.4 MG/ML
80 INJECTION, SOLUTION INTRAMUSCULAR; INTRAVENOUS; SUBCUTANEOUS AS NEEDED
Status: DISCONTINUED | OUTPATIENT
Start: 2021-01-01 | End: 2021-01-01

## 2021-01-01 RX ORDER — ONDANSETRON 2 MG/ML
4 INJECTION INTRAMUSCULAR; INTRAVENOUS AS NEEDED
Status: ACTIVE | OUTPATIENT
Start: 2021-01-01 | End: 2021-01-01

## 2021-01-01 RX ORDER — PROTAMINE SULFATE 10 MG/ML
INJECTION, SOLUTION INTRAVENOUS AS NEEDED
Status: DISCONTINUED | OUTPATIENT
Start: 2021-01-01 | End: 2021-01-01 | Stop reason: SURG

## 2021-01-01 RX ORDER — BUMETANIDE 0.25 MG/ML
2 INJECTION, SOLUTION INTRAMUSCULAR; INTRAVENOUS ONCE
Status: COMPLETED | OUTPATIENT
Start: 2021-01-01 | End: 2021-01-01

## 2021-01-01 RX ORDER — KETOCONAZOLE 20 MG/G
1 CREAM TOPICAL DAILY
Qty: 60 G | Refills: 1 | Status: SHIPPED | OUTPATIENT
Start: 2021-01-01 | End: 2021-01-01 | Stop reason: CLARIF

## 2021-01-01 RX ORDER — FUROSEMIDE 20 MG/1
TABLET ORAL
Qty: 180 TABLET | Refills: 1 | OUTPATIENT
Start: 2021-01-01

## 2021-01-01 RX ORDER — DIGOXIN 0.25 MG/ML
125 INJECTION INTRAMUSCULAR; INTRAVENOUS ONCE
Status: COMPLETED | OUTPATIENT
Start: 2021-01-01 | End: 2021-01-01

## 2021-01-01 RX ORDER — PANTOPRAZOLE SODIUM 40 MG/1
40 TABLET, DELAYED RELEASE ORAL
Refills: 5 | Status: DISCONTINUED | OUTPATIENT
Start: 2021-01-01 | End: 2021-01-01 | Stop reason: SDUPTHER

## 2021-01-01 RX ORDER — DILTIAZEM HYDROCHLORIDE 5 MG/ML
10 INJECTION INTRAVENOUS EVERY 2 HOUR PRN
Status: DISCONTINUED | OUTPATIENT
Start: 2021-01-01 | End: 2021-01-01

## 2021-01-01 RX ORDER — LEVOFLOXACIN 500 MG/1
500 TABLET, FILM COATED ORAL
Status: DISCONTINUED | OUTPATIENT
Start: 2021-01-01 | End: 2021-01-01

## 2021-01-01 RX ORDER — HALOPERIDOL 5 MG/ML
0.25 INJECTION INTRAMUSCULAR ONCE AS NEEDED
Status: CANCELLED | OUTPATIENT
Start: 2021-01-01 | End: 2021-01-01

## 2021-01-01 RX ORDER — ALBUTEROL SULFATE 2.5 MG/3ML
SOLUTION RESPIRATORY (INHALATION)
Status: DISPENSED
Start: 2021-01-01 | End: 2021-01-01

## 2021-01-01 RX ORDER — DILTIAZEM HYDROCHLORIDE 120 MG/1
120 CAPSULE, EXTENDED RELEASE ORAL 2 TIMES DAILY
Qty: 60 CAPSULE | Refills: 11 | Status: SHIPPED | OUTPATIENT
Start: 2021-01-01 | End: 2022-09-03

## 2021-01-01 RX ORDER — ALBUTEROL SULFATE 2.5 MG/3ML
2.5 SOLUTION RESPIRATORY (INHALATION) AS NEEDED
Status: ACTIVE | OUTPATIENT
Start: 2021-01-01 | End: 2021-01-01

## 2021-01-01 RX ORDER — FUROSEMIDE 10 MG/ML
INJECTION INTRAMUSCULAR; INTRAVENOUS AS NEEDED
Status: DISCONTINUED | OUTPATIENT
Start: 2021-01-01 | End: 2021-01-01 | Stop reason: SURG

## 2021-01-01 RX ORDER — TRAMADOL HYDROCHLORIDE 50 MG/1
50 TABLET ORAL EVERY 8 HOURS PRN
Qty: 30 TABLET | Refills: 0 | Status: ON HOLD | OUTPATIENT
Start: 2021-01-01 | End: 2021-01-01

## 2021-01-01 RX ORDER — FUROSEMIDE 40 MG/1
40 TABLET ORAL
Status: DISCONTINUED | OUTPATIENT
Start: 2021-01-01 | End: 2021-01-01

## 2021-01-01 RX ORDER — FLUTICASONE PROPIONATE AND SALMETEROL 50; 250 UG/1; UG/1
1 POWDER RESPIRATORY (INHALATION) 2 TIMES DAILY
COMMUNITY
Start: 2021-01-01 | End: 2021-01-01

## 2021-01-01 RX ORDER — FUROSEMIDE 40 MG/1
40 TABLET ORAL 2 TIMES DAILY
Status: ON HOLD | COMMUNITY
Start: 2021-01-01 | End: 2021-01-01

## 2021-01-01 RX ORDER — DILTIAZEM HYDROCHLORIDE 5 MG/ML
INJECTION INTRAVENOUS
Status: COMPLETED
Start: 2021-01-01 | End: 2021-01-01

## 2021-01-01 RX ORDER — DILTIAZEM HYDROCHLORIDE 5 MG/ML
10 INJECTION INTRAVENOUS ONCE
Status: DISCONTINUED | OUTPATIENT
Start: 2021-01-01 | End: 2021-01-01

## 2021-01-01 RX ORDER — SODIUM CHLORIDE, SODIUM LACTATE, POTASSIUM CHLORIDE, CALCIUM CHLORIDE 600; 310; 30; 20 MG/100ML; MG/100ML; MG/100ML; MG/100ML
INJECTION, SOLUTION INTRAVENOUS CONTINUOUS
Status: DISCONTINUED | OUTPATIENT
Start: 2021-01-01 | End: 2021-01-01

## 2021-01-01 RX ORDER — MELATONIN
Qty: 30 TABLET | Refills: 0 | Status: SHIPPED | OUTPATIENT
Start: 2021-01-01

## 2021-01-01 RX ORDER — HYDROMORPHONE HYDROCHLORIDE 1 MG/ML
0.6 INJECTION, SOLUTION INTRAMUSCULAR; INTRAVENOUS; SUBCUTANEOUS EVERY 5 MIN PRN
Status: CANCELLED | OUTPATIENT
Start: 2021-01-01

## 2021-01-01 RX ORDER — SPIRONOLACTONE 25 MG/1
25 TABLET ORAL DAILY PRN
Qty: 90 TABLET | Refills: 3 | Status: ON HOLD | OUTPATIENT
Start: 2021-01-01 | End: 2021-01-01

## 2021-01-01 RX ORDER — CLINDAMYCIN PHOSPHATE 900 MG/50ML
INJECTION INTRAVENOUS AS NEEDED
Status: DISCONTINUED | OUTPATIENT
Start: 2021-01-01 | End: 2021-01-01 | Stop reason: SURG

## 2021-01-01 RX ORDER — PANTOPRAZOLE SODIUM 20 MG/1
20 TABLET, DELAYED RELEASE ORAL
Status: DISCONTINUED | OUTPATIENT
Start: 2021-01-01 | End: 2021-01-01

## 2021-01-01 RX ORDER — POTASSIUM CHLORIDE 20 MEQ/1
20 TABLET, EXTENDED RELEASE ORAL EVERY 4 HOURS
Status: COMPLETED | OUTPATIENT
Start: 2021-01-01 | End: 2021-01-01

## 2021-01-01 RX ADMIN — PRAVASTATIN SODIUM 20 MG: 20 MG TABLET ORAL at 20:22:00

## 2021-01-01 RX ADMIN — METOPROLOL TARTRATE 50 MG: 50 TABLET, FILM COATED ORAL at 06:13:00

## 2021-01-01 RX ADMIN — IPRATROPIUM BROMIDE AND ALBUTEROL SULFATE 3 ML: 2.5; .5 SOLUTION RESPIRATORY (INHALATION) at 09:36:00

## 2021-01-01 RX ADMIN — PANTOPRAZOLE SODIUM 40 MG: 40 TABLET, DELAYED RELEASE ORAL at 06:25:00

## 2021-01-01 RX ADMIN — PROTAMINE SULFATE 5 MG: 10 INJECTION, SOLUTION INTRAVENOUS at 19:07:00

## 2021-01-01 RX ADMIN — HEPARIN SODIUM 5000 UNITS: 1000 INJECTION, SOLUTION INTRAVENOUS; SUBCUTANEOUS at 17:53:00

## 2021-01-01 RX ADMIN — CIPROFLOXACIN 400 MG: 2 INJECTION, SOLUTION INTRAVENOUS at 11:48:00

## 2021-01-01 RX ADMIN — ROCURONIUM BROMIDE 60 MG: 10 INJECTION, SOLUTION INTRAVENOUS at 16:53:00

## 2021-01-01 RX ADMIN — IPRATROPIUM BROMIDE AND ALBUTEROL SULFATE 3 ML: 2.5; .5 SOLUTION RESPIRATORY (INHALATION) at 08:25:00

## 2021-01-01 RX ADMIN — FUROSEMIDE 40 MG: 10 INJECTION INTRAMUSCULAR; INTRAVENOUS at 17:06:00

## 2021-01-01 RX ADMIN — BISACODYL 10 MG: 10 MG SUPPOSITORY, RECTAL RECTAL at 10:17:00

## 2021-01-01 RX ADMIN — DILTIAZEM HYDROCHLORIDE 10 MG: 5 INJECTION INTRAVENOUS at 17:34:00

## 2021-01-01 RX ADMIN — DILTIAZEM HYDROCHLORIDE 5 MG: 5 INJECTION INTRAVENOUS at 18:56:00

## 2021-01-01 RX ADMIN — FUROSEMIDE 40 MG: 10 INJECTION INTRAMUSCULAR; INTRAVENOUS at 15:03:00

## 2021-01-01 RX ADMIN — ONDANSETRON 4 MG: 2 INJECTION INTRAMUSCULAR; INTRAVENOUS at 19:07:00

## 2021-01-01 RX ADMIN — DILTIAZEM HYDROCHLORIDE 25 MG: 5 INJECTION INTRAVENOUS at 09:07:00

## 2021-01-01 RX ADMIN — IPRATROPIUM BROMIDE AND ALBUTEROL SULFATE 3 ML: 2.5; .5 SOLUTION RESPIRATORY (INHALATION) at 07:59:00

## 2021-01-01 RX ADMIN — PANTOPRAZOLE SODIUM 40 MG: 40 TABLET, DELAYED RELEASE ORAL at 17:40:00

## 2021-01-01 RX ADMIN — PANTOPRAZOLE SODIUM 40 MG: 40 TABLET, DELAYED RELEASE ORAL at 11:13:00

## 2021-01-01 RX ADMIN — METOPROLOL TARTRATE 50 MG: 50 TABLET, FILM COATED ORAL at 18:02:00

## 2021-01-01 RX ADMIN — FUROSEMIDE 40 MG: 10 INJECTION INTRAMUSCULAR; INTRAVENOUS at 08:44:00

## 2021-01-01 RX ADMIN — FUROSEMIDE 40 MG: 10 INJECTION INTRAMUSCULAR; INTRAVENOUS at 09:18:00

## 2021-01-01 RX ADMIN — IPRATROPIUM BROMIDE AND ALBUTEROL SULFATE 3 ML: 2.5; .5 SOLUTION RESPIRATORY (INHALATION) at 20:16:00

## 2021-01-01 RX ADMIN — METOPROLOL TARTRATE 50 MG: 50 TABLET, FILM COATED ORAL at 17:40:00

## 2021-01-01 RX ADMIN — IPRATROPIUM BROMIDE AND ALBUTEROL SULFATE 3 ML: 2.5; .5 SOLUTION RESPIRATORY (INHALATION) at 09:29:00

## 2021-01-01 RX ADMIN — METOPROLOL TARTRATE 50 MG: 50 TABLET, FILM COATED ORAL at 05:51:00

## 2021-01-01 RX ADMIN — METOPROLOL TARTRATE 50 MG: 50 TABLET, FILM COATED ORAL at 17:06:00

## 2021-01-01 RX ADMIN — PANTOPRAZOLE SODIUM 40 MG: 40 TABLET, DELAYED RELEASE ORAL at 17:06:00

## 2021-01-01 RX ADMIN — PANTOPRAZOLE SODIUM 40 MG: 40 TABLET, DELAYED RELEASE ORAL at 07:00:00

## 2021-01-01 RX ADMIN — METOPROLOL TARTRATE 50 MG: 50 TABLET, FILM COATED ORAL at 06:25:00

## 2021-01-01 RX ADMIN — FUROSEMIDE 40 MG: 10 INJECTION INTRAMUSCULAR; INTRAVENOUS at 17:58:00

## 2021-01-01 RX ADMIN — POTASSIUM CHLORIDE 40 MEQ: 20 TABLET, EXTENDED RELEASE ORAL at 08:44:00

## 2021-01-01 RX ADMIN — IPRATROPIUM BROMIDE AND ALBUTEROL SULFATE 3 ML: 2.5; .5 SOLUTION RESPIRATORY (INHALATION) at 22:19:00

## 2021-01-01 RX ADMIN — PANTOPRAZOLE SODIUM 40 MG: 40 TABLET, DELAYED RELEASE ORAL at 17:58:00

## 2021-01-01 RX ADMIN — METOPROLOL TARTRATE 50 MG: 50 TABLET, FILM COATED ORAL at 05:56:00

## 2021-01-01 RX ADMIN — SODIUM CHLORIDE: 9 INJECTION, SOLUTION INTRAVENOUS at 16:52:00

## 2021-01-01 RX ADMIN — IPRATROPIUM BROMIDE AND ALBUTEROL SULFATE 3 ML: 2.5; .5 SOLUTION RESPIRATORY (INHALATION) at 08:16:00

## 2021-01-01 RX ADMIN — PANTOPRAZOLE SODIUM 40 MG: 40 TABLET, DELAYED RELEASE ORAL at 05:56:00

## 2021-01-01 RX ADMIN — PRAVASTATIN SODIUM 20 MG: 20 MG TABLET ORAL at 21:15:00

## 2021-01-01 RX ADMIN — HEPARIN SODIUM 5000 UNITS: 1000 INJECTION, SOLUTION INTRAVENOUS; SUBCUTANEOUS at 17:54:00

## 2021-01-01 RX ADMIN — CIPROFLOXACIN 400 MG: 2 INJECTION, SOLUTION INTRAVENOUS at 12:04:00

## 2021-01-01 RX ADMIN — IPRATROPIUM BROMIDE AND ALBUTEROL SULFATE 3 ML: 2.5; .5 SOLUTION RESPIRATORY (INHALATION) at 20:07:00

## 2021-01-01 RX ADMIN — PRAVASTATIN SODIUM 20 MG: 20 MG TABLET ORAL at 21:27:00

## 2021-01-01 RX ADMIN — FUROSEMIDE 40 MG: 10 INJECTION INTRAMUSCULAR; INTRAVENOUS at 09:02:00

## 2021-01-01 RX ADMIN — SODIUM CHLORIDE: 9 INJECTION, SOLUTION INTRAVENOUS at 00:00:00

## 2021-01-01 RX ADMIN — IPRATROPIUM BROMIDE AND ALBUTEROL SULFATE 3 ML: 2.5; .5 SOLUTION RESPIRATORY (INHALATION) at 19:16:00

## 2021-01-01 RX ADMIN — LIDOCAINE HYDROCHLORIDE 50 MG: 10 INJECTION, SOLUTION EPIDURAL; INFILTRATION; INTRACAUDAL; PERINEURAL at 16:52:00

## 2021-01-01 RX ADMIN — FUROSEMIDE 40 MG: 10 INJECTION INTRAMUSCULAR; INTRAVENOUS at 09:03:00

## 2021-01-01 RX ADMIN — METOPROLOL TARTRATE 50 MG: 50 TABLET, FILM COATED ORAL at 17:58:00

## 2021-01-01 RX ADMIN — SODIUM CHLORIDE, SODIUM LACTATE, POTASSIUM CHLORIDE, CALCIUM CHLORIDE: 600; 310; 30; 20 INJECTION, SOLUTION INTRAVENOUS at 16:51:00

## 2021-01-01 RX ADMIN — FUROSEMIDE 40 MG: 40 TABLET ORAL at 08:47:00

## 2021-01-01 RX ADMIN — HEPARIN SODIUM 5000 UNITS: 1000 INJECTION, SOLUTION INTRAVENOUS; SUBCUTANEOUS at 18:10:00

## 2021-01-01 RX ADMIN — FUROSEMIDE 40 MG: 10 INJECTION INTRAMUSCULAR; INTRAVENOUS at 17:40:00

## 2021-01-01 RX ADMIN — FUROSEMIDE 40 MG: 10 INJECTION INTRAMUSCULAR; INTRAVENOUS at 10:26:00

## 2021-01-01 RX ADMIN — METOPROLOL TARTRATE 50 MG: 50 TABLET, FILM COATED ORAL at 18:29:00

## 2021-01-01 RX ADMIN — PANTOPRAZOLE SODIUM 40 MG: 40 TABLET, DELAYED RELEASE ORAL at 06:07:00

## 2021-01-01 RX ADMIN — PRAVASTATIN SODIUM 20 MG: 20 MG TABLET ORAL at 20:00:00

## 2021-01-01 RX ADMIN — SODIUM CHLORIDE: 9 INJECTION, SOLUTION INTRAVENOUS at 19:43:00

## 2021-01-01 RX ADMIN — CLINDAMYCIN PHOSPHATE 900 MG: 900 INJECTION INTRAVENOUS at 17:46:00

## 2021-01-01 RX ADMIN — DILTIAZEM HYDROCHLORIDE 10 MG: 5 INJECTION INTRAVENOUS at 01:18:00

## 2021-01-01 RX ADMIN — CIPROFLOXACIN 400 MG: 2 INJECTION, SOLUTION INTRAVENOUS at 13:49:00

## 2021-01-01 RX ADMIN — FUROSEMIDE 40 MG: 10 INJECTION INTRAMUSCULAR; INTRAVENOUS at 18:14:00

## 2021-01-01 RX ADMIN — PRAVASTATIN SODIUM 20 MG: 20 MG TABLET ORAL at 21:26:00

## 2021-01-01 RX ADMIN — METOPROLOL TARTRATE 50 MG: 50 TABLET, FILM COATED ORAL at 06:07:00

## 2021-01-01 RX ADMIN — PANTOPRAZOLE SODIUM 40 MG: 40 TABLET, DELAYED RELEASE ORAL at 18:29:00

## 2021-01-01 RX ADMIN — PRAVASTATIN SODIUM 20 MG: 20 MG TABLET ORAL at 21:30:00

## 2021-01-01 RX ADMIN — CIPROFLOXACIN 400 MG: 2 INJECTION, SOLUTION INTRAVENOUS at 17:22:00

## 2021-01-01 RX ADMIN — PROTAMINE SULFATE 35 MG: 10 INJECTION, SOLUTION INTRAVENOUS at 19:12:00

## 2021-01-01 RX ADMIN — PANTOPRAZOLE SODIUM 40 MG: 40 TABLET, DELAYED RELEASE ORAL at 05:05:00

## 2021-01-01 RX ADMIN — IPRATROPIUM BROMIDE AND ALBUTEROL SULFATE 3 ML: 2.5; .5 SOLUTION RESPIRATORY (INHALATION) at 14:24:00

## 2021-01-01 RX ADMIN — HEPARIN SODIUM 5000 UNITS: 1000 INJECTION, SOLUTION INTRAVENOUS; SUBCUTANEOUS at 18:25:00

## 2021-01-01 RX ADMIN — PANTOPRAZOLE SODIUM 40 MG: 40 TABLET, DELAYED RELEASE ORAL at 17:12:00

## 2021-01-01 RX ADMIN — POTASSIUM CHLORIDE 40 MEQ: 20 TABLET, EXTENDED RELEASE ORAL at 09:02:00

## 2021-01-01 RX ADMIN — METOPROLOL TARTRATE 50 MG: 50 TABLET, FILM COATED ORAL at 05:16:00

## 2021-01-01 RX ADMIN — FUROSEMIDE 40 MG: 10 INJECTION INTRAMUSCULAR; INTRAVENOUS at 23:16:00

## 2021-01-01 RX ADMIN — METOPROLOL TARTRATE 50 MG: 50 TABLET, FILM COATED ORAL at 05:05:00

## 2021-01-01 RX ADMIN — BUMETANIDE 2 MG: 0.25 INJECTION, SOLUTION INTRAMUSCULAR; INTRAVENOUS at 12:00:00

## 2021-01-01 RX ADMIN — PANTOPRAZOLE SODIUM 40 MG: 40 TABLET, DELAYED RELEASE ORAL at 05:16:00

## 2021-01-01 RX ADMIN — IPRATROPIUM BROMIDE AND ALBUTEROL SULFATE 3 ML: 2.5; .5 SOLUTION RESPIRATORY (INHALATION) at 13:26:00

## 2021-01-12 NOTE — ANESTHESIA POSTPROCEDURE EVALUATION
Patient: Rigoberto Jolly    Procedure Summary     Date: 01/12/21 Room / Location: [address] [place] [address] ENDOSCOPY 04 / [address] [place] [address] ENDOSCOPY    Anesthesia Start: 1156 Anesthesia Stop:     Procedure: ESOPHAGOGASTRODUODENOSCOPY (EGD) (N/A ) Diagnosis:       Gastroesophageal reflux d

## 2021-01-12 NOTE — ANESTHESIA PREPROCEDURE EVALUATION
Anesthesia PreOp Note    HPI:     Jihan Ware is a 81year old female who presents for preoperative consultation requested by: Ramana Denson MD    Date of Surgery: 1/12/2021    Procedure(s):  ESOPHAGOGASTRODUODENOSCOPY (EGD)  Indication: Yonathan Goldberg Asthma    • CAD (coronary artery disease)    • COPD (chronic obstructive pulmonary disease) ([facility])    • Coronary atherosclerosis    • Depression    • Emphysema lung ([facility])    • Esophageal reflux    • Extrinsic asthma, unspecified    • Fall 2014    SUTURES ON tablet twice daily, 1 hour before breakfast and 1 hour before dinner., Disp: 180 capsule, Rfl: 5, 1/12/2021    •  traMADol HCl 50 MG Oral Tab, Take 1 tablet (50 mg total) by mouth every 8 (eight) hours as needed for Pain., Disp: 30 tablet, Rfl: 0, 1/11/202 COMMENTS)    Comment:Asthma attack    Family History   Problem Relation Age of Onset   • Cancer Other         APPENDIX, NEPHEW, Hilda Sidle, NEPHEW   • Hypertension Mother    • Heart Disease Mother [de-identified]        CAD, CAUSE OF DEATH   • Cancer Paternal Grandmother Topics      Concerns:        Not on file    Social History Narrative      Not on file      Available pre-op labs reviewed.      Lab Results   Component Value Date     11/18/2020    K 4.3 11/18/2020     11/18/2020    CO2 29.0 11/18/2020    BUN 44

## 2021-01-12 NOTE — OPERATIVE REPORT
[facility] Endoscopy Report      Date of Procedure:  01/12/21        Preoperative Diagnosis:  1. Dyspepsia and nausea  2. Known large hiatal hernia  3. Possible Carranza's esophagus      Postoperative Diagnosis:  1.   Carranza's esophagus appropriately with insufflated air. The mucosa of the stomach including cardia, fundus, gastric body and antrum was normal.  Biopsies from the antrum were obtained.   The duodenal bulb and post bulbar regions were normal although the junction between the f

## 2021-01-12 NOTE — H&P
History & Physical Examination    Patient Name: Marlee Brewer  MRN: [id number]  CSN: [id number]  YOB: 1935    Diagnosis: Dyspepsia and known large hiatal hernia        •  mirtazapine 15 MG Oral Tab, Take by mouth., Disp: , Rfl: , 1/11/2021 medicare part b - J44.9, Disp: 60 ampule, Rfl: 5, 1/12/2021    •  Melatonin 5 MG Oral Tab, Take 2 tablets by mouth nightly.  , Disp: , Rfl: , 1/11/2021    •  Vitamin C (VITAMIN C) 500 MG Oral Tab, Take 500 mg by mouth daily. , Disp: , Rfl: , 1/11/2021    • Laterality Date   • FOOT SURGERY Left 2012   • HIP REPLACEMENT SURGERY Right 2000   • KNEE SURGERY Left 1950   • OTHER Left 08/31/2017    distal femoral replacement   • TONSILLECTOMY  1940S   • TOTAL HIP REPLACEMENT     • TOTAL KNEE REPLACEMENT       Famil

## 2021-02-05 NOTE — TELEPHONE ENCOUNTER
Called pharmacy and confirmed has current rx with 2 refills.
Current Outpatient Medications:   •  furosemide 40 MG Oral Tab, Take 1 tablet (40 mg total) by mouth daily. , Disp: 90 tablet, Rfl: 3  •
Walk in

## 2021-03-16 NOTE — TELEPHONE ENCOUNTER
Pt. wants to know if its is ok for her to take Celebrex medication? Pt. States that Dr Richard Parmar at [facility] is prescribing the Celebrex, but instructed the pt. To ask Dr Monika Barrientos, to make sure it is ok for the pt.  To take after getting a cor

## 2021-03-18 NOTE — TELEPHONE ENCOUNTER
Per MB, not ideal for patient to take celebrex and increased risk of bleeding. Notified patient that it is not a good idea and to s/w ortho MD  for alternate medication.    While on the phone she asked for lasix 20 mg tab as hard to cut her 40 mg in half fo 11/18/2020    BUN 44 (H) 11/18/2020    CREATSERUM 1.43 (H) 11/18/2020    BUNCREA 30.8 (H) 11/18/2020    GFRNAA 34 (L) 11/18/2020    GFRAA 39 (L) 11/18/2020    CA 9.5 11/18/2020    ALKPHOS 76 07/02/2015    AST 18 08/21/2020    ALT 29 08/21/2020    BILT 0.5

## 2021-05-11 NOTE — TELEPHONE ENCOUNTER
Noted. Clotrimazole–betamethasone cream sent. Please disregard triamcinolone and ketoconazole. Use CeraVe cream-this is over-the-counter.

## 2021-05-11 NOTE — TELEPHONE ENCOUNTER
Yessenia Tejada was called and informed of medication that was sent and provider's recommendations  She will call patient and inform her of both

## 2021-05-11 NOTE — TELEPHONE ENCOUNTER
Buddy Hendricks, with [facility], called to inform provider she saw patient today    She noticed that patient has dermatitis on her upper and lower back    Per Buddy Hendricks, patient's back has a red rash without blister formation.      She states patient scratches bot

## 2021-06-22 NOTE — PROGRESS NOTES
History of Present Illness   Patient ID: Triny Laura is a 81year old female. Chief Complaint: Hospital F/U      HPI   Pleasant 18-year-old female presents with her niece for hospital follow-up.   I requested medical records from [address] [place] [address] normal.      Left Ear: External ear normal.   Eyes:      Extraocular Movements: Extraocular movements intact. Conjunctiva/sclera: Conjunctivae normal.   Cardiovascular:      Rate and Rhythm: Normal rate. Heart sounds: Normal heart sounds.    Pulmo information is available in the after visit summary.   For now we will treat the acute gout flare with Medrol Dosepak and have her follow-up with podiatry or rheumatology depending on the x-ray results, further recommendations to follow after review of outs Alternate with 40 mg dose 45 tablet 1   • FEROSUL 325 (65 Fe) MG Oral Tab TAKE 1 TABLET(325 MG) BY MOUTH EVERY OTHER DAY 45 tablet 0   • XARELTO 15 MG Oral Tab Take 1 tablet (15 mg total) by mouth daily.  90 tablet 3   • spironolactone 25 MG Oral Tab Take 1 b - J44.9 60 ampule 5          Keyona Thompson MD  Internal Medicine       Call office with any questions or seek emergency care if necessary. Patient understands and agrees to follow directions and advice.       ----------------------------------------- PA

## 2021-06-22 NOTE — PATIENT INSTRUCTIONS
Please ask [place] [address] physician to send me the primary care a copy of the [address] discharge summary from whichever facility you were at prior to Felicita Pineda. Items I need  1.  Discharge medication list from [facility];

## 2021-06-28 NOTE — TELEPHONE ENCOUNTER
Jaquelin Wong, states that the patient is there in their office. Jaquelin Wong, would like to know if a list of medications can be faxed to them at fax: [phone].

## 2021-06-29 NOTE — TELEPHONE ENCOUNTER
Please advise-     Echo result today 6/29:  Study Conclusions   1. Left ventricle: The cavity size was normal. Wall thickness was      increased in a pattern of mild LVH. Systolic function was mildly      reduced.  The estimated ejection fraction was 45-50% treatment options for her mitral regurgitation including options of surgery or mitral valve clip evaluation. Patient is high risk for surgery but may be a candidate for mitral valve clip needed.   To further assess if she is a candidate we will arrange for

## 2021-06-29 NOTE — TELEPHONE ENCOUNTER
Not seen since November and not sure how she is feeling. May be she should be seen in follow-up. Patient has significant MR and may be a good candidate to be evaluated in the valve clinic.

## 2021-06-29 NOTE — TELEPHONE ENCOUNTER
S/w Mira and reviewed echo result of valves, and to discuss and further review at OV in 2 weeks. [employer] states understanding.

## 2021-07-09 PROBLEM — I36.1 NONRHEUMATIC TRICUSPID VALVE REGURGITATION: Status: ACTIVE | Noted: 2021-01-01

## 2021-07-09 PROBLEM — I51.89: Status: ACTIVE | Noted: 2021-01-01

## 2021-07-09 NOTE — PROGRESS NOTES
History of Present Illness   Patient ID: Mounika Chu is a 81year old female. Chief Complaint: Edema (legs) and Tuberculosis      HPI   Pleasant 27-year-old female presents with her son to discuss  3. She has been having significant leg swelling.   C atrial pressure      (grade 4 diastolic dysfunction). 2. Ventricular septum: Thickness was mildly increased. 3. Aortic valve: Mild regurgitation. 4. Mitral valve: Severe regurgitation. 5. Left atrium: The atrium was severely dilated.    6. Right atr tablet; Refill: 3  - spironolactone 25 MG Oral Tab; Take 1 tablet (25 mg total) by mouth daily as needed (for weight greater than 130lbs. ). Dispense: 90 tablet; Refill: 3  - metoprolol tartrate 50 MG Oral Tab;  Take 1 tablet (50 mg total) by mouth 2 (two) (for weight greater than 130lbs. ). Dispense: 90 tablet; Refill: 3  Plan  Medications reconciled with a list provided by patient and son from Dr. Irineo Rahman office, we will continue with her current medications at her current doses.   Further recommendation needed (for weight greater than 130lbs. ). 90 tablet 3   • metoprolol tartrate 50 MG Oral Tab Take 1 tablet (50 mg total) by mouth 2 (two) times daily.  180 tablet 3   • metolazone 2.5 MG Oral Tab Take 1 tablet (2.5 mg total) by mouth daily as needed (for we part b - J44.9 60 ampule 5          Yenifer Arellano MD  Internal Medicine       Call office with any questions or seek emergency care if necessary. Patient understands and agrees to follow directions and advice.       ---------------------------------------

## 2021-07-12 NOTE — TELEPHONE ENCOUNTER
Contacted patient and her son, the patient needs to return for placement one to three weeks after the reading of the first test. The patient is scheduled for her second PPD placement on Mon 7/19/21 at 1300

## 2021-07-13 NOTE — PATIENT INSTRUCTIONS
Take extra furosemide 40 mg on Monday Wednesday Friday afternoon    Recheck BMP in 1 week and call with response to extra diuretics regarding shortness of breath    Irma Kirkpatrick to call for mitral valve clip evaluation at [facility]

## 2021-07-13 NOTE — PROGRESS NOTES
[facility] CLINIC  PROGRESS NOTE    Essence Luu is a 81year old female. Patient presents with:   Follow - Up: 6mo  Test Results: ECHO results 06/29/21  Dyspnea: Daily  Edema: feet, legs    HPI:   This is a pleasant 81year old female with asthma A. fib c Potassium Chloride ER (KLOR-CON M10) 10 MEQ Oral Tab CR Take 1 tablet (10 mEq total) by mouth daily. 90 tablet 3   • Omeprazole 40 MG Oral Capsule Delayed Release Take 1 tablet twice daily, 1 hour before breakfast and 1 hour before dinner.  180 capsule 5 pressure    • High cholesterol    • Hypercholesterolemia    • Osteoarthritis    • Other and unspecified hyperlipidemia     HIGH CHOLESTEROL   • Shortness of breath    • Unspecified essential hypertension    • Unspecified sleep apnea       Social History: and oriented x3      Assessment   ASSESSMENT AND PLAN:     Problem List Items Addressed This Visit     Cardiomyopathy ([address]    Relevant Medications    ADVAIR DISKUS 250-50 MCG/DOSE Inhalation Aerosol Powder, Breath Activated    Other Relevant Orders    BAS

## 2021-07-15 NOTE — TELEPHONE ENCOUNTER
Mitraclip process/procedure explained. Consult scheduled for 7/29 at 1 pm. Info packet mailed to pt.

## 2021-07-16 NOTE — TELEPHONE ENCOUNTER
Please send symbicort or dulera (can use these inhalers as needed or scheduled) or whatever the  cheapest generic formulary moderate dose inhaled corticosteroid/long acting beta agonist inhaler her insurance will cover. Thanks.

## 2021-07-16 NOTE — TELEPHONE ENCOUNTER
Pt is calling because even with insurance it will cost her 100 dollars for this med and she wants to know if there is a generic or something else she can not afford that.  Please call  Medication Quantity Refills Start End   ADVAIR DISKUS 250-50 MCG/DOSE In

## 2021-07-16 NOTE — TELEPHONE ENCOUNTER
Patient called to inform of lower tier alternatives provided by insurance plan.      -Advair HFA  -Breo Ellipta  -Symbico  -Tarsha Shanks

## 2021-07-16 NOTE — TELEPHONE ENCOUNTER
Dr. Rebecca Lundborg, patient is requesting [facility]. Please send medication. *Strengths of medication was not provided on message below.

## 2021-07-19 NOTE — PROGRESS NOTES
Pt here for 2nd ppd skin test, name and  verified  Given in left forearm pt tolerated injection well.  Pt sched for tb read 21 at 1pm

## 2021-07-21 NOTE — TELEPHONE ENCOUNTER
Pt states Dr Job Roth changed her medication to a higher dose then Dr Gonzalez Smith had her on and she wants to know if that is ok. . she don't know why Dr changed it to the 50mg twice a day.  Please call  metoprolol tartrate 50 MG Oral Tab 180 tablet 3 7/9/2021 10/

## 2021-07-21 NOTE — PROGRESS NOTES
Pt name and  verified  Tb read negative, gave patient completed forms  And faxed to [employer] [facility] at [phone] per pt's request

## 2021-07-21 NOTE — TELEPHONE ENCOUNTER
[facility] for mague, 3rd time faxing failed. If he wants to mail them the forms himself or drop them off he can do so, but on our end I cannot do much else. ..   As jared stated \"this is the only fax number and I am receiving faxes right now so it is working\"

## 2021-07-21 NOTE — TELEPHONE ENCOUNTER
Spoke with son mague (ok annette)  For information on assisted living. He stated the facility is [facility] in [place]. Called them at [phone] I spoke with jared to verify fax number we have is #[phone] so confirmed.   Explained I fa

## 2021-07-29 NOTE — PROGRESS NOTES
Dr. Nam Ba referral.  Procedure and process explained to patient and son. All questions answered. Verbalized understanding.

## 2021-07-30 NOTE — CONSULTS
[facility]    PATIENT'S NAME: Lola Avery   ATTENDING PHYSICIAN: Kuldeep Bergman M.D.   [facility] Chaitanya: Seda Esteves M.D.    PATIENT ACCOUNT#:   [de-identified]    LOCATION:  [facility]  MEDICAL RECORD #:   [id number]       DATE OF BIRTH:  [birth year] very pleasant. LUNGS:  Clear. HEART:  She has an underlying irregular rhythm with a soft tricuspid and mitral insufficiency murmur. ABDOMEN:  Soft. EXTREMITIES:  She has bilateral lower extremity edema.   She has had multiple operations on her l

## 2021-08-02 NOTE — TELEPHONE ENCOUNTER
Scheduled for mitraclip procedure on 8/12. Instructed to schedule PAT's (blood work, ekg, chest x-ray) this week at [place]. Will call later in week to review pre-procedure instructions. Pt. Verbalized understanding.

## 2021-08-03 NOTE — CONSULTS
Cardiothoracic Surgery  Mitraclip Consult       Referring: Dr Howard Cox  PCP: Dr Gavin Thomas  Reason for consult: severe symptomatic mitral regurgitation  81year old with symptomatic mitral regurgitation with increased SOB, CUADRA, fatigue  Had recent admission for been ordered  Pt was seen and examined by me today and after reviewing the chart and talking with the patient   the assessment after discussion with Dr Shahrzad Suresh is that the patient is at   High risk for open MVr/MVR and would be best served by mitraclip  Th

## 2021-08-06 NOTE — PROGRESS NOTES
Pre-Procedural Instructions for  Transcatheter Mitral Valve Repair (MitraClip)  I called and spoke to the patient/family and provided the following instructions:   Your MitraClip Procedure is scheduled for: Thursday 8/12    • Nothing to eat or drink after m

## 2021-08-06 NOTE — TELEPHONE ENCOUNTER
Pre-procedure instructions reviewed with son for mitraclip on [place] on 8/12. Will call on Wed. With TOA. Instructed to bring CPAP tubing and mask.  V/U

## 2021-08-11 NOTE — TELEPHONE ENCOUNTER
11 am arrival time on 8/12/21 for mitraclip procedure. Check in at [facility]. Please bring your insurance card and photo id. Nothing to eat or drink after midnight. Hold AM meds. Call with any questions. Number provided.

## 2021-08-12 PROBLEM — Z98.890 S/P MITRAL VALVE CLIP IMPLANTATION: Status: ACTIVE | Noted: 2021-01-01

## 2021-08-12 PROBLEM — Z95.818 S/P MITRAL VALVE CLIP IMPLANTATION: Status: ACTIVE | Noted: 2021-01-01

## 2021-08-12 PROBLEM — I50.43 ACUTE ON CHRONIC COMBINED SYSTOLIC AND DIASTOLIC CONGESTIVE HEART FAILURE (HCC): Status: ACTIVE | Noted: 2021-01-01

## 2021-08-12 NOTE — ANESTHESIA PREPROCEDURE EVALUATION
PRE-OP EVALUATION    Patient Name: Lorena Quinn    Admit Diagnosis: Mitral valve insufficiency, unspecified etiology [I34.0]    Pre-op Diagnosis: * No pre-op diagnosis entered *        Anesthesia Procedure: CATH MITRAL CLIP    * No surgeons found in lo MG Oral Capsule Delayed Release, Take 1 tablet twice daily, 1 hour before breakfast and 1 hour before dinner., Disp: 180 capsule, Rfl: 5, 8/11/2021 at PM  Albuterol Sulfate  (90 Base) MCG/ACT Inhalation Aero Soln, Inhale 2 puffs into the lungs every Perfumes      Anesthesia Evaluation    Patient summary reviewed.     Anesthetic Complications  (-) history of anesthetic complications         GI/Hepatic/Renal      (+) GERD and well controlled                          Cardiovascular            MET: <=4 Cardiovascular    Cardiovascular exam normal.  Rhythm: regular  Rate: normal     Dental    No notable dental history.          Pulmonary    Pulmonary exam normal.        (+) decreased breath sounds         Other findings            ASA: 4   Plan: general  N

## 2021-08-12 NOTE — ANESTHESIA PROCEDURE NOTES
Airway  Date/Time: 8/12/2021 4:55 PM  Urgency: elective    Airway not difficult    General Information and Staff    Patient location during procedure: OR  Anesthesiologist: Coral Luo MD  Performed: anesthesiologist     Indications and Patient Co

## 2021-08-12 NOTE — ANESTHESIA PROCEDURE NOTES
Arterial Line  Performed by: Henry Peña MD  Authorized by: Henry Peña MD     General Information and Staff    Procedure Start:   Anesthesiologist: Henry Peña MD  Patient Location:  OR  Indication: continuous blood pressure m

## 2021-08-12 NOTE — H&P
[facility]     PATIENT'S NAME: Easton Post   ATTENDING PHYSICIAN: Spike Fernandez M.D.   CONSULTING PHYSICIAN: Home Mejia M.D.    PATIENT ACCOUNT#:   [de-identified]    LOCATION:  [facility]  MEDICAL RECORD #:   [id number]       DATE OF BIRTH:  1 She is very pleasant. LUNGS:  Clear. HEART:  She has an underlying irregular rhythm with a soft tricuspid and mitral insufficiency murmur. ABDOMEN:  Soft. EXTREMITIES:  She has bilateral lower extremity edema.   She has had multiple operations o AM   Consult on 7/29/2021     Consult on 7/29/2021        Detailed Report    Addendum:    Has had progressive CUADRA and general difficulty breathing since our visit. Will proceed with EDUARDO and hopefully an attempt at MV repair if technically feasible.    Note

## 2021-08-12 NOTE — PROGRESS NOTES
Pt admitted for mitraclip via wheelchair accompanied by pt's son, Dorys Ornelas. Pt unable to stand on own, BLE edema present. Pt dyspneic with exertion, labored breathing at rest. Dr. Irais Ghosh and Melissa PINEDA notified.  Melissa PINEDA and Dr. Irais Ghosh at bedside

## 2021-08-12 NOTE — ANESTHESIA PROCEDURE NOTES
Peripheral IV  Inserted by: Brian Luo MD    Placement  Needle size: 18 G  Laterality: right  Location: forearm  Site prep: alcohol  Technique: anatomical landmarks  Attempts: 1

## 2021-08-13 PROBLEM — I34.0 MITRAL VALVE INSUFFICIENCY: Status: ACTIVE | Noted: 2020-09-15

## 2021-08-13 NOTE — PLAN OF CARE
Received patient at 07:30 awake and alert following commands. Lungs diminished attempted room air. Patient stated feels short of breath. Neb treatment given and scheduled. Right groin open to air soft nontender. Bilateral legs +3-+4 swelling.   Lasix g

## 2021-08-13 NOTE — PROGRESS NOTES
Prelim    Single XTR wide mitraclip via right CFV    Torrential MR reduced to moderate    Mean gradient 5-6    Improvement in hemodynamics - mean LA pressure lower, V-wave higher and SBP higher.     Perclose right CFV    Updated patient's son Vasile Solano

## 2021-08-13 NOTE — PLAN OF CARE
Received patient from cath lab at [address] [place] [address] 190. Right groin clean and dry no hematoma. Palpable pulses. Red, warm, edema bilateral lower extremities. CPAP overnight. Afib 's, SBP 's. Willard removed. Up to chair in AM x1 assist with walker.  Plan discusse

## 2021-08-13 NOTE — PROGRESS NOTES
Subjective:   OOB in chair on exam, breathing improved this am.  She currently denies CP, dizziness, palpitations, or SOB.       Objective:   /69   Pulse 101   Temp 97.8 °F (36.6 °C) (Temporal)   Resp 21   Wt 143 lb 4.8 oz (65 kg)   SpO2 97%   B reduced. The estimated ejection fraction was 45-50%.  Wall motion      was normal; there were no regional wall motion abnormalities.      Doppler parameters are consistent with an irreversible      restrictive pattern, indicative of decreased left ventricul greater than 130lbs. )., Disp: 90 tablet, Rfl: 3  FEROSUL 325 (65 Fe) MG Oral Tab, TAKE 1 TABLET(325 MG) BY MOUTH EVERY OTHER DAY, Disp: 45 tablet, Rfl: 0  Pravastatin Sodium 20 MG Oral Tab, TAKE 1 TABLET(20 MG) BY MOUTH EVERY NIGHT, Disp: 90 tablet, Rfl: 3 Vitamins-Minerals (CENTRUM SILVER) Oral Tab, Take  by mouth. Take one tablet by mouth daily, Disp: , Rfl:   Vitamin D3 (VITAMIN D3) 2000 UNITS Oral Cap, Take  by mouth. take 1 Capsule by Oral route  every day, Disp: , Rfl:           Assessment:  1.  POD # 1

## 2021-08-13 NOTE — CDS QUERY
Jessika Cota  Dear Doctor,  Clinical information (provided below) includes a diagnosis that requires additional specificity regarding status.  For accurate ICD-10-CM code assignment,    PLEASE (X) THE DESCRIPTION THAT APPLIES TO

## 2021-08-13 NOTE — PROGRESS NOTES
[facility] CARDIOLOGY  EDUARDO intraoperative structural procedure note    Colleen Cadet Location:      MRN [id number]   Admission Date 8/12/2021 Operation Date 8/12/2021   Attending Physician Tyler Power MD Operating Physician Foy Jeans, MD     Pre-Operativ

## 2021-08-13 NOTE — CM/SW NOTE
08/13/21 1200   CM/SW Referral Data   Referral Source    Reason for Referral Discharge planning   Informant Patient; Son   Pertinent Medical Hx   Does patient have an established PCP?  Yes   Patient Info   Patient's Current Mental Status at [place] Iqra Ornelas will need report from hospital RN at time of discharge. Await PT recs to finalize plan. / to remain available for support and/or discharge planning.      Maren Chua RN, [address] [facility] [address]

## 2021-08-13 NOTE — ANESTHESIA POSTPROCEDURE EVALUATION
[facility] Patient Status:  Inpatient   Age/Gender 81year old female MRN [id number]   [facility] 6NE-A Attending Tyler Power MD   Hosp Day # 0 PCP Cierra Raman MD       Anesthesia Post-op Note        Procedure Sum

## 2021-08-13 NOTE — PROCEDURES
[facility]    PATIENT'S NAME: Korin Shirley   ATTENDING PHYSICIAN: Acacia Wyatt M.D. OPERATING PHYSICIAN: Chana Garcia M.D.    PATIENT ACCOUNT#:   [de-identified]    LOCATION:  [address] [place] [address]  MEDICAL RECORD #:   [id number]       DATE OF BIRTH: ventricular systolic function appears lower normal.  There is no pericardial effusion. The aortic valve is trileaflet, with diffuse degeneration and shaggy irregularity of all 3 leaflets noted.   The tricuspid valve leaflets appear grossly normal.  There i exchanged over a Safari wire for a MitraClip delivery catheter. The XTW clip was advanced to the tip of the catheter.   With EDUARDO imaging guidance, the catheter was then positioned at an appropriate depth into the left atrium and the device advanced to th catheter was then withdrawn back into the right atrium. Postprocedure, there was no evidence for pericardial effusion or other early mechanical complication.   Left to right shunting through the septal puncture site was demonstrated by both Doppler as well

## 2021-08-13 NOTE — PROCEDURES
[facility]    PATIENT'S NAME: Misael Hawk   ATTENDING PHYSICIAN: Angeles Hernandez M.D. OPERATING PHYSICIAN: Arleen Alonso M.D.    PATIENT ACCOUNT#:   [de-identified]    LOCATION:  [address] [place] [address]  MEDICAL RECORD #:   [id number]       DATE OF SERGIO the SL1 sheath went across the interatrial septum. Further analysis revealed we were in a fairly good spot; therefore, we decided to keep this. It should be noted that 5000 units of heparin was given as soon as we punctured the common femoral vein.   Shanae Husain

## 2021-08-13 NOTE — PROCEDURES
[facility]    PATIENT'S NAME: Gwen Cordova   ATTENDING PHYSICIAN: Chari Mackey M.D. OPERATING PHYSICIAN: Chris Bonilla M.D.    PATIENT ACCOUNT#:   [de-identified]    LOCATION:  [address] [place] [address]  MEDICAL RECORD #:   [id number]       DATE OF BIRTH the end of the procedure, hemostasis was achieved by deploying a Perclose device in the femoral vein. I updated the patient's son, Jacob Mccullough, on his mother's condition immediately upon completing the procedure.     Dictated By Lee Bhat M.D.  d: 08/12

## 2021-08-14 NOTE — PROGRESS NOTES
PT consult received and appreciated, chart reviewed. Attempted to evaluate pt this AM, per tele monitoring pt's HR up to 142 sitting in chair with RN.  Discussed with RN, pt currently not appropriate due to tachycardia at rest. Will re-attempt evaluation la

## 2021-08-14 NOTE — PROGRESS NOTES
Subjective:   OOB in chair - tachypnea, orthopnea, slight chest pressure - s/p neb Tx. Afib rates 110-130. [place] dizziness, palpitations.  Vol ol - wt/I/O innaccurate    Objective:   /67 (BP Location: Left arm)   Pulse 120   Temp 98.2 °F (36.8 Laboratory/Data:  Diagnostics:   Echo:  8/13/21:  1. Left ventricle: The cavity size was normal. Wall thickness was mildly      increased. Systolic function was normal. The estimated ejection fraction      was 60-65%.  No regional wall motion abnormalities 130lbs. )., Disp: 90 tablet, Rfl: 3  metoprolol tartrate 50 MG Oral Tab, Take 1 tablet (50 mg total) by mouth 2 (two) times daily. , Disp: 180 tablet, Rfl: 3  metolazone 2.5 MG Oral Tab, Take 1 tablet (2.5 mg total) by mouth daily as needed (for weight great (CENTRUM SILVER) Oral Tab, Take  by mouth. Take one tablet by mouth daily, Disp: , Rfl:   Vitamin D3 (VITAMIN D3) 2000 UNITS Oral Cap, Take  by mouth. take 1 Capsule by Oral route  every day, Disp: , Rfl:           Assessment:  1.  POD # 2 s/p Mitraclip XTR

## 2021-08-14 NOTE — DIETARY NOTE
[facility]   CLINICAL NUTRITION    Marvin Lester     Admitting diagnosis:  Mitral valve insufficiency, unspecified etiology [I34.0]    Ht: 4'11\"   Wt: 65.3 kg (143 lb 15.4 oz). BMI: Body mass index is 29.08 kg/m².   IBW: Patient must be at least

## 2021-08-14 NOTE — CONSULTS
[facility] PULMONARY/CRITICAL CARE CONSULTATION    HPI: Pt is a 80 y/o WF with severe COPD, CHF, A Fib, PATRICIA that presented for a mitral clip. Post op has noticed some increased SOB. No cough, sputum, f/c/s. No CP. Patient has LE edema.   Given hx we were asked OTHER DAY   Melatonin 5 MG Oral Tab 8/11/2021 at PM  Yes Yes   Sig: Take 2 tablets by mouth nightly. Mometasone Furo-Formoterol Fum (DULERA) 100-5 MCG/ACT Inhalation Aerosol   No No   Sig: Inhale 2 puffs into the lungs 2 (two) times daily.    Multiple V (50 mg total) by mouth 2 (two) times daily. spironolactone 25 MG Oral Tab 8/11/2021 at Unknown time  No Yes   Sig: Take 1 tablet (25 mg total) by mouth daily as needed (for weight greater than 130lbs. ).   traMADol HCl 50 MG Oral Tab   No No   Sig: Take 1 of education: Not on file      Highest education level: Not on file    Occupational History      Not on file    Tobacco Use      Smoking status: Former Smoker        Packs/day: 1.50        Years: 10.00        Pack years: 15        Types: Cigarettes prostate   • Heart Disease Father         PREMATURE CAD   • Alcohol and Other Disorders Associated Father         ALCOHOLISM       ROS: 10 pt ROS negative except what is mentioned in HPI    OBJECTIVE:   08/14/21  0826 08/14/21  0925 08/14/21  1103 08/14/21 INR     No results for input(s): TROP, CK in the last 168 hours. Imaging -- reviewed and visualized  CXR -- increased pulm vasc congestion with small effusion, large hiatal hernia and elevated L hemidiaphragm. Assessment and Plan:    1.  Dyspnea/acute h

## 2021-08-14 NOTE — CONSULTS
[facility]  Report of Consultation    Jihan Ware Patient Status:  Inpatient    1935 MRN [id number]   [facility] 8NE-A Attending Markie Reardon MD   Hosp Day # 2 PCP Panchito Maya MD       Assessment / Plan:    1) JOSE- most c • Cancer Paternal Grandmother    • Cancer Brother         prostate   • Heart Disease Father         PREMATURE CAD   • Alcohol and Other Disorders Associated Father         ALCOHOLISM     Denies family history of kidney disease.     reports that she quit s HYDROcodone-acetaminophen (NORCO) 5-325 MG per tab 2 tablet, 2 tablet, Oral, PRN  •  metoprolol tartrate (LOPRESSOR) tab 50 mg, 50 mg, Oral, 2x Daily(Beta Blocker)  No current outpatient medications on file.       Review of Systems:  Please see HPI for pert

## 2021-08-14 NOTE — H&P
[facility]  History and Physical     Yang Jackson Patient Status:  Inpatient    1935 MRN [id number]   [facility] 8NE-A Attending Doyle Evans MD   Hosp Day # 2 PCP Nata Menezes MD     Chief Complaint: Shortness of audelia alcohol and does not use drugs.     Family History:   Family History   Problem Relation Age of Onset   • Cancer Other         APPENDIX, NEPHEW, Loralie Maker, NEPHEW   • Hypertension Mother    • Heart Disease Mother [de-identified]        CAD, CAUSE OF DEATH   • Cancer Pater 0  Pravastatin Sodium 20 MG Oral Tab, TAKE 1 TABLET(20 MG) BY MOUTH EVERY NIGHT, Disp: 90 tablet, Rfl: 3  Potassium Chloride ER (KLOR-CON M10) 10 MEQ Oral Tab CR, Take 1 tablet (10 mEq total) by mouth daily. , Disp: 90 tablet, Rfl: 3  Omeprazole 40 MG Oral 60   Temp 97.8 °F (36.6 °C) (Oral)   Resp 18   Wt 143 lb 15.4 oz (65.3 kg)   SpO2 90%   BMI 29.08 kg/m²   General: No acute distress. Alert and oriented x 3. HEENT: Normocephalic atraumatic. Moist mucous membranes. EOM-I. PERRLA. Anicteric.   Neck: No lymp regurgitation seen on EDUARDO  2. Chronic atrial fibrillation with an episode of rapid ventricular response on beta-blocker and Xarelto and known normal ejection fraction  3. Shortness of breath early in the morning compliant with volume overload  4.  Acute kid

## 2021-08-14 NOTE — PLAN OF CARE
Pt denies c/o pain, malaise, or cardiac symptoms. A&Ox4. Lungs diminished bilaterally with equal expansion, on 2L O2 NC. Pt afib on monitor. Abdomen soft and non-tender with active bowel sounds in all four quadrants. Continent of B&B.  Pt updated with plan electrolytes and administer replacement therapy as ordered  Outcome: Progressing

## 2021-08-14 NOTE — PLAN OF CARE
Assumed care of pt at [facility]. A/ox4, 2L NC during the day & CPAP at night. Atrial fib on tele. No reports of pain. Breath sounds clear/diminished upon auscultation; pt reports productive, strong cough. +3 edema present in RLE, +2 edema present in LLE.  Bilate Evaluate effectiveness of antiarrhythmic and heart rate control medications as ordered  - Initiate emergency measures for life threatening arrhythmias  - Monitor electrolytes and administer replacement therapy as ordered  Outcome: Progressing

## 2021-08-15 NOTE — PLAN OF CARE
Received patient at 1. Patient A/O x 4. Tele Rhythm Afib/Flutter. O2 Saturation 100%. 2L NC during day, CPAP on at night. Breath sounds clear/diminished. No C/O chest pain. SOB with exertion. Patient has productive and congested sounding cough.  Wh control medications as ordered  - Initiate emergency measures for life threatening arrhythmias  - Monitor electrolytes and administer replacement therapy as ordered  Outcome: Progressing

## 2021-08-15 NOTE — PROGRESS NOTES
[facility] PULMONARY/[facility]IT[facility]ARE    S: no new events overnight.     Meds:  • furosemide  40 mg Intravenous BID (Diuretic)   • Fluticasone Furoate-Vilanterol  1 puff Inhalation Daily   • pravastatin  20 mg Oral Nightly   • rivaroxaban  15 mg Oral Daily with break last 72 hours.   Lab Results   Component Value Date    COVID19 Not Detected 01/09/2021     Recent Labs     08/14/21  0934   ABGPHT 7.43   DEPMIA1X 30*   TRBDU9I 95   ABGHCO3 19.7*   FIO2 30     Recent Labs     08/14/21  0934   LACTIART 3.7*       No results

## 2021-08-15 NOTE — PLAN OF CARE
Assumed care of patient ~0730. States breathing is a little better today. Denies chest pain, palpitations, light-headedness/dizziness. Aflutter, HR 90s, up to 110-120 w/ activity. Sats >90% on 2L NC.  Still gets dyspneic with exertion but recovers w/ rest. Provide Smoking Cessation handout, if applicable  - Encourage broncho-pulmonary hygiene including cough, deep breathe, Incentive Spirometry  - Assess the need for suctioning and perform as needed  - Assess and instruct to report SOB or any respiratory diff

## 2021-08-15 NOTE — PROGRESS NOTES
Subjective: In bed on exam, still with CUADRA - but feels breathing improved from yesterday. She denies CP, palpitations, dizziness.   Does c/o itching arms/back - no rash noted    Objective:   BP 97/65 (BP Location: Left arm)   Pulse 93   Temp 97.2 °F Diagnostics:   Echo:  8/14/21:  1. Left ventricle: The cavity size was normal. Wall thickness was at the      upper limits of normal. Systolic function was normal. The estimated      ejection fraction was 60-65%.  No diagnostic evidence for regional wall tablet (40 mg total) by mouth daily. , Disp: 90 tablet, Rfl: 3  spironolactone 25 MG Oral Tab, Take 1 tablet (25 mg total) by mouth daily as needed (for weight greater than 130lbs. )., Disp: 90 tablet, Rfl: 3  metoprolol tartrate 50 MG Oral Tab, Take 1 table Bill medicare part b - J44.9, Disp: 60 ampule, Rfl: 5  Vitamin C (VITAMIN C) 500 MG Oral Tab, Take 500 mg by mouth daily. , Disp: , Rfl:   Multiple Vitamins-Minerals (CENTRUM SILVER) Oral Tab, Take  by mouth.  Take one tablet by mouth daily, Disp: , Rfl:   V

## 2021-08-15 NOTE — OCCUPATIONAL THERAPY NOTE
OCCUPATIONAL THERAPY EVALUATION - INPATIENT     Room Number: 2791/9910-L  Evaluation Date: 8/15/2021  Type of Evaluation: Initial  Presenting Problem:  (mitraclip valve repair, BLE edema)    Physician Order: IP Consult to Occupational Therapy  Reason for T TONSILLECTOMY  1940S   • TOTAL HIP REPLACEMENT     • TOTAL KNEE REPLACEMENT         OCCUPATIONAL PROFILE    HOME SITUATION  Type of Home: House (with ramp entrance)  Home Layout: Two level; Able to live on main level  Lives With: Alone (family nearby, visit Shoulder flexion 40 degrees  Right Elbow flexion 90 degrees  Right Elbow extension able to achieve 180 but painful    Upper extremity strength is within functional limits except RUE not tested d/t pt reports of pain and inability to lift items with RUE understanding. Pt performed UE strength/ROM assessment; significant impairments noted in R shoulder and elbow ROM. Pt reports using LUE for lifting or reaching tasks.  Pt reporting increased comfort with second person for transfers and functional mobility; impact the patient’s ability to participate in dressing, toileting, bathing/showering, instrumental activities of daily living, rest and sleep, community re-entry, leisure and social participation.      The patient is functioning below her previous function training; Compensatory technique education  Rehab Potential : Fair  Frequency (Obs): 3-5x/week  Number of Visits to Meet Established Goals: 5    ADL Goals   Patient will perform lower body dressing:  with supervision  Patient will perform toileting: with ga

## 2021-08-15 NOTE — PHYSICAL THERAPY NOTE
PHYSICAL THERAPY EVALUATION - INPATIENT     Room Number: 0261/8237-Q  Evaluation Date: 8/15/2021  Type of Evaluation: Initial  Physician Order: PT Eval and Treat    Presenting Problem: MV clip implantation  Reason for Therapy: Mobility Dysfunction an Alone  Drives: No  Patient Owned Equipment: Rolling walker  Patient Regularly Uses: Reading glasses    Prior Level of [place]: ambulates with RW within house, has multiple family members that live close by and assist with iADLS, will be moving into AL AM-PAC Score:  Raw Score: 18   Approx Degree of Impairment: 46.58%   Standardized Score (AM-PAC Scale): 43.63   CMS Modifier (G-Code): CK    FUNCTIONAL ABILITY STATUS  Gait Assessment   Gait Assistance: Contact guard assist  Distance (ft): 20, 25  As to mod indep levels as pt is motivated and participative. The patient is below baseline and would benefit from skilled inpatient PT to address the above deficits to assist patient in returning to prior to level of function.   DISCHARGE RECOMMENDATIONS  PT

## 2021-08-15 NOTE — PROGRESS NOTES
[facility]  Nephrology Progress Note    Samantha Perez Attending:  Ruslan Wright MD       Assessment and Plan:    1) JOSE- most c/w renal hypoperfusion (hypotension / 3rd spacing); no other clear insult. Meds benign. UA suggestive of UTI.  No evidenc 08/14/2021    MG 2.2 08/14/2021       Imaging: All imaging studies reviewed.     Meds:   dilTIAZem HCl (cardIZEM) injection 10 mg, 10 mg, Intravenous, Once  patiromer (VELTASSA) 8.4 g packet 8.4 g, 8.4 g, Oral, Daily  ipratropium-albuterol (DUONEB) nebuliz

## 2021-08-16 NOTE — PLAN OF CARE
Received patient at [facility]. Patient A/O x 4. Tele Rhythm AFlutter. O2 Saturation 100%. 2L NC during day, CPAP at night. Breath sounds. this evening sounded course. Increased work of breathing noted, pt c/o SOB but not trouble breathing.  Patient has baseline  Description: INTERVENTIONS:  - Continuous cardiac monitoring, monitor vital signs, obtain 12 lead EKG if indicated  - Evaluate effectiveness of antiarrhythmic and heart rate control medications as ordered  - Initiate emergency measures for life t

## 2021-08-16 NOTE — CM/SW NOTE
Attempted to meet with patient to discuss discharge planning as ELAINE is recommended--pt declined, stating 'has it all arranged.'  Believes her new assisted living facility in [place] has everything arranged; will confirm with son tomorrow

## 2021-08-16 NOTE — PROGRESS NOTES
[facility] Pharmacy Note:  Renal Adjustment for ciprofloxacin (CIPRO)    Antonio Guerrero is a 81year old patient who has been prescribed ciprofloxacin (CIPRO) 400 mg every 12 hrs.   Pt <5 ft tall; estimated crcl=16.8 ml/min The dose has been adjusted to ciproflo

## 2021-08-16 NOTE — CARDIAC REHAB
Attempted to see patient for cardiac rehab education  Patient refusing at this time  Will revisit if time allows.

## 2021-08-16 NOTE — PLAN OF CARE
Patient laying in bed, denies CP, SOB and dizziness. Patient AOx4, forgetful at times; Afib on cardiac monitor; lungs clear bilaterally, RA with oxygen saturations >95%, no cough noted; abdomen soft with active bowel sounds; BLE edema noted.  Call light wit

## 2021-08-16 NOTE — CONSULTS
[facility]    Urology Consult Note    Al Núñez Patient Status:  Inpatient    1935 MRN [id number]   [facility] 8NE-A Attending Tiffany Fan MD   Hosp Day # 4 PCP Man Capellan MD     Date of Admission:  2021  Date of History   Problem Relation Age of Onset   • Cancer Other         APPENDIX, NEPHEW, Bean Anicetoy, NEPHEW   • Hypertension Mother    • Heart Disease Mother [de-identified]        CAD, CAUSE OF DEATH   • Cancer Paternal Grandmother    • Cancer Brother         prostate   • Hea sclera non-icteric  ORAL CAVITY: mucosa moist  NECK/THYROID: no obvious masses or goiter  LUNGS: non-labored breathing  ABDOMEN: soft, nontender, nondistended  EXTREMITIES: warm, well-perfused. No clubbing, cyanosis or edema.   SKIN: no obvious rashes    Re in rehab once acute medical issues resolve. Pt is agreeable with plan    Thank you very much for this consult. Please call if there are any questions or concerns.      Ligia Nieves MD, FACS  Urologist  [facility]    Date: 8/16/2021  Time: 5:02

## 2021-08-16 NOTE — PROGRESS NOTES
Progress Note  Tsering Tolliver Patient Status:  Inpatient    1935 MRN [id number]   [facility] 8NE-A Attending Nuzhat Hairston MD   Hosp Day # 4 PCP Nimo Kaur MD     Subjective:  Denies chest pain or SOB.  Complains of constipation furosemide  40 mg Intravenous BID (Diuretic)   • Fluticasone Furoate-Vilanterol  1 puff Inhalation Daily   • pravastatin  20 mg Oral Nightly   • rivaroxaban  15 mg Oral Daily with breakfast   • pantoprazole  40 mg Oral BID AC   • metoprolol tartrate  50 mg

## 2021-08-16 NOTE — PROGRESS NOTES
[facility]     Nephrology Progress Note    Horace Hadadd Patient Status:  Inpatient    1935 MRN [id number]   [facility] 8NE-A Attending Sarika Arias MD   Hosp Day # 4 PCP Hallie Jolly MD       SUBJECTIVE:  C/o urinary retent 1.98* 2.21* 1.98* 1.76*   CA 8.7 8.8 9.2 9.0 8.9   MG  --   --  2.2 2.1  --    * 129* 197* 123* 115*       No results for input(s): ALT, AST, ALB, AMYLASE, LIPASE, LDH in the last 168 hours.     Invalid input(s): ALPHOS, TBIL, DBIL, TPROT    No resul

## 2021-08-16 NOTE — PROGRESS NOTES
[facility]  Progress Note     Young Running Patient Status:  Inpatient    1935 MRN [id number]   [facility] 8NE-A Attending Guera Severino MD   Hosp Day # 4 PCP Aure Byrd MD     Chief Complaint: s/p mitraclip    S: Stephy Pro-Calcitonin  No results for input(s): PCT in the last 168 hours. Cardiac  Recent Labs   Lab 08/16/21  0505   PBNP 8,884*       Creatinine Kinase  No results for input(s): CK in the last 168 hours.     Inflammatory Markers  No results for input(s

## 2021-08-16 NOTE — PROGRESS NOTES
[address] [facility] Patient Status:  Inpatient    1935 MRN [id number]   [facility] 8NE-A Attending Bo Rodriguez MD   Hosp Day # 4 PCP Miguelito Perea MD     SUBJECTIVE: Pt denies SOB.   She complains of constipation and non-distended, positive BS.                         Extremity: No clubbing or cyanosis. no edema                          Skin: No rashes or lesions.     Lab Results   Component Value Date    WBC 9.2 08/16/2021    RBC 3.50 08/16/2021    HGB 10.3 08/16/2021

## 2021-08-17 NOTE — PROGRESS NOTES
[address] [facility] Patient Status:  Inpatient    1935 MRN [id number]   [facility] Attending Bucky Devine MD   Hosp Day # 5 PCP Favio Mullins MD     SUBJECTIVE:  Pt complains of increased SOB today.       OBJECTI base                          Chest wall: No tenderness or deformity.                         GHBUE: DJGXMEZ rate and rhythm, normal S1S2                          Abdomen: soft, non-tender, non-distended, positive BS.                           CZONXAEZE: N

## 2021-08-17 NOTE — PROGRESS NOTES
[facility]     Nephrology Progress Note    Colton Pimentel Patient Status:  Inpatient    1935 MRN [id number]   [facility] 8NE-A Attending Evan Gunn MD   Hosp Day # 5 PCP Yovani Houser MD       SUBJECTIVE:  Had BM yest, state CREATSERUM 1.98* 2.21* 1.98* 1.76* 1.47*   CA 8.8 9.2 9.0 8.9 9.2   MG  --  2.2 2.1  --   --    * 197* 123* 115* 124*       No results for input(s): ALT, AST, ALB, AMYLASE, LIPASE, LDH in the last 168 hours.     Invalid input(s): ALPHOS, TBIL, DBIL bedside.         Clemente Michelle MD  8/17/2021  10:34 AM

## 2021-08-17 NOTE — PROGRESS NOTES
[facility]  Progress Note     Antonio Guerrero Patient Status:  Inpatient    1935 MRN [id number]   [facility] 8NE-A Attending Bucky Devine MD   Hosp Day # 5 PCP Favio Mullins MD     Chief Complaint: s/p mitralclip    S: Patie 01/09/2021    COVID19 Not Detected 09/29/2020       Pro-Calcitonin  No results for input(s): PCT in the last 168 hours. Cardiac  Recent Labs   Lab 08/16/21  0505   PBNP 8,884*       Creatinine Kinase  No results for input(s): CK in the last 168 hours.

## 2021-08-17 NOTE — PHYSICAL THERAPY NOTE
PHYSICAL THERAPY TREATMENT NOTE - INPATIENT    Room Number: 7717/5952-P     Session: 1  Number of Visits to Meet Established Goals: 3    Presenting Problem: MV clip implantation    History related to current admission: 82 yo female with hx of TKR, THR, AF better.     OBJECTIVE  Precautions: Bed/chair alarm    WEIGHT BEARING RESTRICTION  Weight Bearing Restriction: None                PAIN ASSESSMENT   Ratin          BALANCE walker. Pt stood w/ rw for 5 min while conversing w/ therapist, taking one step forward and back. Pt fatigued after 5 min, sats remained stable. Pt rated jairo as 2/10 during activity.     THERAPEUTIC EXERCISES  Lower Extremity Alternating marching  Ankle p

## 2021-08-17 NOTE — PROGRESS NOTES
Progress Note  Manpreet Bhat Patient Status:  Inpatient    1935 MRN [id number]   [facility] 8NE-A Attending Naveed Art MD   Hosp Day # 5 PCP Jannet Barajas MD     Subjective:  Up in chair. Comfortable on RA, O2 sats 100%.  State Negative. Physical Exam:    Gen: alert, oriented x 3, NAD  Heent: pupils equal, reactive.  Mucous membranes moist.   Neck: no jvd  Cardiac: irregularly irregular, normal W9,T1, soft systolic murmur  Lungs: CTA  Abd: soft, NT/ND +bs  Ext: 1+ ble edema nebs but improved by this afternoon. No chest pain. Feels lower extremity swelling continues to improve. CV exam, irregular, irregular, L5W2, soft systolic murmur at apex, 1+ b/l LE edema.   Limited in uptitration of BB due to HR, will continue to [facility]

## 2021-08-17 NOTE — PLAN OF CARE
Patient sitting in chair, denies CP, SOB and dizziness. Patient AOx4; Afib on cardiac monitor; BLE edema noted; lungs diminished bilaterally, RA with oxygen saturations >95%, non productive cough noted. Call light with in reach. Chair alarm on.      Updated

## 2021-08-17 NOTE — PLAN OF CARE
Assumed care at 1. Pt is A&Ox4. Pt is on RA, w/ sats 95%, wears CPAP at night. Pt is in afib on tele, VSS. Pt has harrison in place, continent of bowel. Pt denies pain at this time. Pt ambulates up x2 w/ walker. Plan of care reviewed w/ patient.  All needs ADULT  Goal: Achieves optimal ventilation and oxygenation  Description: INTERVENTIONS:  - Assess for changes in respiratory status  - Assess for changes in mentation and behavior  - Position to facilitate oxygenation and minimize respiratory effort  - Oxyg

## 2021-08-18 NOTE — PLAN OF CARE
Assumed care at [phone]. Patient alert and oriented x4. Adequate saturation on RA with diminished lung sounds. CPAP at night. Afib on cardiac monitor. Denies any pain. No N/V/D. Bed alarm is on. Bed at the lowest position. Call light within reach.        Prob oxygenation  Description: INTERVENTIONS:  - Assess for changes in respiratory status  - Assess for changes in mentation and behavior  - Position to facilitate oxygenation and minimize respiratory effort  - Oxygen supplementation based on oxygen saturation

## 2021-08-18 NOTE — PLAN OF CARE
Assumed care at [address] [facility]. Pt is A&Ox4. Pt is on RA, w/ sats 95%, wears CPAP at night. Pt is in afib on tele, VSS. Pt has hrarison in place, continent of bowel. Pt denies pain at this time. Pt ambulates up x2 w/ walker. Plan of care reviewed w/ patient.  All needs ADULT  Goal: Achieves optimal ventilation and oxygenation  Description: INTERVENTIONS:  - Assess for changes in respiratory status  - Assess for changes in mentation and behavior  - Position to facilitate oxygenation and minimize respiratory effort  - Oxyg

## 2021-08-18 NOTE — PROGRESS NOTES
Pulmonary Progress Note     Assessment / Plan:  1. Dyspnea/acute hypoxia - secondary to pulm edema asso with CHF and renal dys fxn in the setting of severe COPD.  Now complains of increased SOB despite oxygenating well and with largely normal exam. Minimal

## 2021-08-18 NOTE — PLAN OF CARE
Patient sitting in chair, denies CP, SOB and dizziness. Patient AOx4; afib on cardiac monitor. Patient up with x1 assist. Nova catheter secure and in place, draining. Chair alarm on, call light with in reach. Plan for ELAINE placement.      Problem: CARDIOVAS

## 2021-08-18 NOTE — CM/SW NOTE
Spoke with son Arsenio Parmar via cell () regarding current ELAINE recommendations for his mother. Patient briefly discussed ELAINE need and wishes for rfrrals be sent by hospital.  Yassine referrals sent; DON requested.   Will email son with accepting facili

## 2021-08-18 NOTE — CM/SW NOTE
Accepting [facility] facilities emailed to bar @ [email or web address]; paper copy given to patient to review

## 2021-08-18 NOTE — PROGRESS NOTES
Subjective: In bed on exam, angry - wants to leave. She AAO x 3, feels her breathing is \"not good today. \" SpO2 100% on RA, dyspneic. She denies CP, dizziness, or palpitations.       Objective:   /65 (BP Location: Left arm)   Pulse 96   Temp function was normal. The estimated      ejection fraction was 60-65%. No diagnostic evidence for regional wall      motion abnormalities.  Doppler parameters are consistent with restrictive      physiology, indicative of decreased left ventricular diastolic Tab, Take 1 tablet (25 mg total) by mouth daily as needed (for weight greater than 130lbs. )., Disp: 90 tablet, Rfl: 3  metoprolol tartrate 50 MG Oral Tab, Take 1 tablet (50 mg total) by mouth 2 (two) times daily. , Disp: 180 tablet, Rfl: 3  metolazone 2.5 M Tab, Take 500 mg by mouth daily. , Disp: , Rfl:   Multiple Vitamins-Minerals (CENTRUM SILVER) Oral Tab, Take  by mouth. Take one tablet by mouth daily, Disp: , Rfl:   Vitamin D3 (VITAMIN D3) 2000 UNITS Oral Cap, Take  by mouth.  take 1 Capsule by Oral route

## 2021-08-18 NOTE — PROGRESS NOTES
[facility]     Nephrology Progress Note    Tsering Tolliver Patient Status:  Inpatient    1935 MRN [id number]   [facility] 8NE-A Attending Nuzhat Hairston MD   Hosp Day # 6 PCP Nimo Kaur MD       SUBJECTIVE:  Stable this AM 104  --    CO2 24.0  --  25.0 25.0 29.0  --  28.0  --    BUN 58*  --  69* 68* 57*  --  51*  --    CREATSERUM 2.21*  --  1.98* 1.76* 1.47*  --  1.46*  --    CA 9.2  --  9.0 8.9 9.2  --  9.2  --    MG 2.2  --  2.1  --   --   --   --   --    *  --  123 Bonita Reeves MD  8/18/2021  11:29 AM

## 2021-08-19 NOTE — PROGRESS NOTES
[facility]     Nephrology Progress Note    Essence Luu Patient Status:  Inpatient    1935 MRN [id number]   [facility] 8NE-A Attending Jed John MD   Hosp Day # 7 PCP Doris Desouza MD       SUBJECTIVE:  Stable this AM, wa 137  --    K 5.7*   < > 4.3 3.9 3.5 4.0  --  4.0     --  101 102 102  --  104  --    CO2 24.0  --  25.0 25.0 29.0  --  28.0  --    BUN 58*  --  69* 68* 57*  --  51*  --    CREATSERUM 2.21*  --  1.98* 1.76* 1.47*  --  1.46*  --    CA 9.2  --  9.0 8.9 diuretics     5)  Urinary retention- appreciate  eval          Questions/concerns were discussed with patient and/or family by bedside.         Zoila Morley MD  8/19/2021  8:26 AM

## 2021-08-19 NOTE — PROGRESS NOTES
[address] [facility] Patient Status:  Inpatient    1935 MRN [id number]   [facility] 8NE-A Attending Dylan Dutton MD   Hosp Day # 7 PCP Marianne Luis MD     SUBJECTIVE: Pt continues to have some intermittent SOB.     OBJ deformity.                         YIJES: UNGXCBIETSM                          Abdomen: soft, non-tender, non-distended, positive BS.                         Extremity: No clubbing or cyanosis.  trace edema                          Skin: No rashes or lesi

## 2021-08-19 NOTE — PLAN OF CARE
Pt is A&Ox4. RA, requesting prn neb, lungs clear/diminished saturating 98%. Afib on tele HR 90-100s, 130s ambulating. Nova draining clear yellow urine. Denies pain. BLE red, small blister R ankle d/t hitting leg on motorized scooter. Up x1 walker.  PIV flu RESPIRATORY - ADULT  Goal: Achieves optimal ventilation and oxygenation  Description: INTERVENTIONS:  - Assess for changes in respiratory status  - Assess for changes in mentation and behavior  - Position to facilitate oxygenation and minimize respiratory

## 2021-08-19 NOTE — PLAN OF CARE
Problem: Patient/Family Goals  Goal: Patient/Family Long Term Goal  Description: Patient's Long Term Goal: Patient would like to remain out of the hospital    Interventions:  - Take meds, follow up visits  - See additional Care Plan goals for specific in supplementation based on oxygen saturation or ABGs  - Provide Smoking Cessation handout, if applicable  - Encourage broncho-pulmonary hygiene including cough, deep breathe, Incentive Spirometry  - Assess the need for suctioning and perform as needed  - Ass

## 2021-08-19 NOTE — PROGRESS NOTES
Eulalio Barrow has just arrived at the [facility] for subacute rehab post MV clip. She has an order from the hospital for tramadol 50 mg PO Q8 prn for pain but no prescription was sent. E-scribed new prescription to [facility] for subacute rehab use.      Tamara STILL

## 2021-08-19 NOTE — PROGRESS NOTES
Subjective:   OOB in chair on exam, still complains of SOB same as past 3 days. She currently denies CP, dizziness, chest pressure.   She is awaiting rehab placement    Objective:   BP 96/65 (BP Location: Left arm)   Pulse 102   Temp 98.2 °F (36.8 °C The estimated      ejection fraction was 60-65%. No diagnostic evidence for regional wall      motion abnormalities.  Doppler parameters are consistent with restrictive      physiology, indicative of decreased left ventricular diastolic compliance      and/ 3  metolazone 2.5 MG Oral Tab, Take 1 tablet (2.5 mg total) by mouth daily as needed (for weight greater than 130lbs. )., Disp: 90 tablet, Rfl: 3  FEROSUL 325 (65 Fe) MG Oral Tab, TAKE 1 TABLET(325 MG) BY MOUTH EVERY OTHER DAY, Disp: 45 tablet, Rfl: 0  Prav Capsule by Oral route  every day, Disp: , Rfl:           Assessment:  · POD # 7 s/p mitraclip XTR  ?  Echo still with moderate MR, mod-severe TR, mg 3mmhg  · Chronic atrial fibrillation, rates controlled On xarelto  · Acute on chronic HFrEF, LVEF now 60-65%

## 2021-08-19 NOTE — PROGRESS NOTES
[facility]  Progress Note     Rebecca Wild Patient Status:  Inpatient    1935 MRN [id number]   [facility] 8NE-A Attending Mikaela Vides MD   Hosp Day # 7 PCP Mando Francois MD     Chief Complaint: s/p mitralclip    S: Patie SCr of 1.46 mg/dL (H)). No results for input(s): PTP, INR in the last 168 hours.          COVID-19 Lab Results    COVID-19  Lab Results   Component Value Date    COVID19 Not Detected 01/09/2021    COVID19 Not Detected 09/29/2020       Pro-Calcitonin  No

## 2021-08-20 NOTE — PROGRESS NOTES
Celina Schirmer Author: Abena Thao MD     1935 MRN [id number]   [facility]  Admission 21      Last Hospital Discharge 21 PCP Андрей Faulkner of Discharge  [facility]        CC --admitted to [facility] Surgical History:   Procedure Laterality Date   • FOOT SURGERY Left 2012   • HIP REPLACEMENT SURGERY Right 2000   • KNEE REPLACEMENT SURGERY     • KNEE SURGERY Left 1950   • OTHER Left 08/31/2017    distal femoral replacement   • TONSILLECTOMY  1940S   • T Tab Take 1 tablet (50 mg total) by mouth every 8 (eight) hours as needed for Pain. 30 tablet 0   • Mometasone Furo-Formoterol Fum (DULERA) 100-5 MCG/ACT Inhalation Aerosol Inhale 2 puffs into the lungs 2 (two) times daily.  3 each 3   • ADVAIR DISKUS 250-50 SYSTEMS:  Review of Systems:   Constitutional: No fevers, chills, fatigue or night sweats. ENT: No mouth pain, neck pain, running nose, headaches or swollen glands.   Skin: No rashes, pruritus or skin changes,  Respiratory: Denies cough, wheezing or shortn insufficiency, unspecified etiology    S/P mitral valve clip implantation    Chronic obstructive pulmonary disease, unspecified COPD type ([facility]    Cardiomyopathy, unspecified type ([facility]    [facility]    Obstructive sleep apnea (ad

## 2021-08-20 NOTE — DISCHARGE SUMMARY
[facility]  DISCHARGE SUMMARY     Jess Meza Patient Status:  Inpatient    1935 MRN [id number]   [facility] 8NE-A Attending No att. providers found   Hosp Day # 7 PCP Mellody Kehr, MD     Date of Admission: 2021  Joaquín mg total) by mouth 2 (two) times daily.    Refills: 0        CONTINUE taking these medications      Instructions Prescription details   Advair Diskus 250-50 MCG/DOSE Aepb  Generic drug: fluticasone-salmeterol      Inhale 1 puff into the lungs 2 (two) times known as: ALDACTONE      Take 1 tablet (25 mg total) by mouth daily as needed (for weight greater than 130lbs. ). Quantity: 90 tablet  Refills: 3     Vitamin C 500 MG Tabs  Commonly known as: VITAMIN C      Take 500 mg by mouth daily.    Refills: 0     Vit entire visit, only to be removed if asked to do so by your provider. We are working to limit the number of people in our waiting rooms and ask that patients do not bring anyone with them to their appointment unless clinically required.         Location Ins Salena Zurita MD    Time spent:  > 30 minutes

## 2021-08-23 NOTE — PROGRESS NOTES
Neil Sigala  : 1935  Age 81year old  female patient is admitted to Facility: [facility] for subacute rehab.     [address] [place] [address] Drive date:  21  Discharge date to [facility]: 21  ELOS:  14 days  Anticipated discharge cholesterol    • Hypercholesterolemia    • Osteoarthritis    • Other and unspecified hyperlipidemia     HIGH CHOLESTEROL   • Shortness of breath    • Unspecified essential hypertension    • Unspecified sleep apnea      Past Surgical History:   Procedure La not on file    CODE STATUS:  Full Code    ADVANCED CARE PLANNING TEAM: None      CURRENT MEDICATIONS   Current Outpatient Medications   Medication Sig Dispense Refill   • furosemide 40 MG Oral Tab Take 1 tablet (40 mg total) by mouth 2 (two) times daily. C) 500 MG Oral Tab Take 500 mg by mouth daily. • Multiple Vitamins-Minerals (CENTRUM SILVER) Oral Tab Take  by mouth. Take one tablet by mouth daily     • Vitamin D3 (VITAMIN D3) 2000 UNITS Oral Cap Take  by mouth.  take 1 Capsule by Oral route  every d rebound tenderness. :Deferred  LYMPHATIC:no lymphedema  MUSCULOSKELETAL: RUE elbow with bursitis/arthritis edema, tenderness, no erythema, no abscess noted.  diminished strength and ROM, chronic she states  EXTREMITIES/VASCULAR:radial pulses 2+ and dorsa [5]  Length of hospital stay >or =to 5 days                                   [2]    Patient's total score: [place] 73 score=Risk level/Risk of potentially avoidable 30-day readmission:  0- daily  Follow-up oncology as directed  Chronic O2 3 L    Acute on chronic diastolic heart failure  Ejection fraction 60 to 65% previously 45%  Had volume overload as an inpatient  Diuretics per cardiology and renal  Daily weights; Call if weight gain of 2# Dayami Coker MD [facility]   9/9/2021  9:00 AM Steve Gonzalez, PT [facility] PT EM [facility] CARE SYSTEM OF [facility]   9/14/2021  2:30 PM Apn, Structural Heart [id number] [facility]   9/20/2021  9:00 AM Steve Gonzalez, PT [facility] PT EM [facility] OF [facility]   9/22/2021  9:00 AM Mynor Augustine PTA [facility] PT

## 2021-08-24 NOTE — CDS QUERY
Heart Failure  Ru Arroyo  Dear Doctor:  Clinical information (provided below) includes a diagnosis of Heart Failure.  For accurate ICD-10-CM code assignment,  PLEASE “X” THE DIAGNOSIS THAT APPLIES:     (    ) Acute on Chr

## 2021-08-26 NOTE — PROGRESS NOTES
Rebecca Wild, 2/14/1935, 81year old, female    Chief Complaint:  Patient presents with:   Follow - Up: s/p MitralClip, CHF, weakness  Lab Results  Weakness       Subjective:    HPI:  Sami Marte is an 70-year-old female with previous medical history includi erythema, no abscess noted.  diminished strength and ROM, chronic she states  EXTREMITIES/VASCULAR:radial pulses 2+ and dorsalis pedal pulses 2+  NEUROLOGIC: A&OX3, no focal deficits, follows commands  PSYCHIATRIC: calm, cooperative, mood and affect appropr chronic diastolic heart failure  Ejection fraction 60 to 65% previously 45%  Had volume overload as an inpatient  Diuretics per cardiology and renal  Daily weights; Call if weight gain of 2# overnight or 4# in 5 days  2 gram sodium diet  2 liter/day fluid 8/30/2021 10:00 AM WESTMONT SUNIL PUL[facility] TECH [place] [facility] [address]   8/30/2021 10:45 AM Moustapha Burton MD [place] [facility] [address]   9/9/2021  9:00 AM Richard Mckinley, PT [facility] PT EM [address]   9/14/2021  2:30 PM Apn, Structural Heart [facility]   9/

## 2021-08-28 PROBLEM — R06.02 SHORT OF BREATH ON EXERTION: Status: ACTIVE | Noted: 2021-01-01

## 2021-08-28 PROBLEM — I50.9 ACUTE CONGESTIVE HEART FAILURE, UNSPECIFIED HEART FAILURE TYPE (HCC): Status: ACTIVE | Noted: 2021-01-01

## 2021-08-28 PROBLEM — Z99.89 OSA ON CPAP: Status: ACTIVE | Noted: 2017-08-10

## 2021-08-28 PROBLEM — J18.9 HCAP (HEALTHCARE-ASSOCIATED PNEUMONIA): Status: ACTIVE | Noted: 2021-01-01

## 2021-08-28 PROBLEM — G47.33 OSA ON CPAP: Status: ACTIVE | Noted: 2017-08-10

## 2021-08-28 PROBLEM — I48.91 ATRIAL FIBRILLATION WITH RAPID VENTRICULAR RESPONSE (HCC): Status: ACTIVE | Noted: 2021-01-01

## 2021-08-28 NOTE — CONSULTS
[address] Cardiology  Consultation    Rebecca Wild Patient Status:  Inpatient    1935 MRN [id number]   [facility] 2NE-A Attending Glenny Patiño [facility] Day # 0 PCP Mando Francois MD     Subjective:   Well known to our group, rosemary inspiration, my review.      Labs:  Lab Results   Component Value Date    WBC 9.6 08/28/2021    HGB 10.7 08/28/2021    HCT 34.1 08/28/2021    .0 08/28/2021     Lab Results   Component Value Date     08/28/2021    K 3.1 08/28/2021     08/2

## 2021-08-28 NOTE — PROGRESS NOTES
Pt. Alert and o x 4 , on O2 at 3-2 li/min via nc ; O2 sat at 98 % at 2 li/min via nc , uses CPAP at HS. Still c/o SOB , orthopneic , with some expiratory wheezing. Database/assessment completed. Cont. Monitor per tele/labs/v/s.  Instructed to call

## 2021-08-28 NOTE — PROGRESS NOTES
[facility] Resource Referral Counselor Note    Ether Houston Patient Status:  Inpatient    1935 MRN [id number]   [facility] 2NE-A Attending Roberts Chapelgabbie [facility] Day # 0 PCP Abdirizak Friedman MD       S(subjective)

## 2021-08-28 NOTE — ED QUICK NOTES
Orders for admission, patient is aware of plan and ready to go upstairs. Any questions, please call ED Abdias   at extension [phone]    Vaccinated? yes  Type of COVID test sent:rapid covid test negative  COVID Suspicion level:negative Low/High      Titratabl

## 2021-08-28 NOTE — PROGRESS NOTES
[facility] Pharmacy Note:  Renal Adjustment for levofloxacin (LEVAQUIN)    Wilmer Reyes is a 81year old patient who has been prescribed levofloxacin (LEVAQUIN) 750 mg every 24 hrs.   The estimated creatinine clearance is 18.4 mL/min (A) (based on SCr of 1.69

## 2021-08-28 NOTE — H&P
[facility] HOSPITALIST  History and Physical     Valjean Danger Patient Status:  Emergency    1935 MRN [id number]   Location [address] Attending Jessica Miller MD   Hosp Day # 0 PCP Hallie Jolly MD     Chief Complaint: p Past Surgical History:   Procedure Laterality Date   • FOOT SURGERY Left 2012   • HIP REPLACEMENT SURGERY Right 2000   • KNEE REPLACEMENT SURGERY     • KNEE SURGERY Left 1950   • OTHER Left 08/31/2017    distal femoral replacement   • TONSILLECTOMY  1940 Pain., Disp: 30 tablet, Rfl: 0  Mometasone Furo-Formoterol Fum (DULERA) 100-5 MCG/ACT Inhalation Aerosol, Inhale 2 puffs into the lungs 2 (two) times daily. , Disp: 3 each, Rfl: 3  ADVAIR DISKUS 250-50 MCG/DOSE Inhalation Aerosol Powder, Breath Activated, I 2000 UNITS Oral Cap, Take  by mouth. take 1 Capsule by Oral route  every day, Disp: , Rfl:         Review of Systems:   A comprehensive 14 point review of systems was completed. Pertinent positives and negatives noted in the HPI.     Physical Exam:    BP COVID19 Not Detected 08/27/2021    COVID19 Not Detected 08/19/2021    COVID19 Not Detected 01/09/2021       Pro-Calcitonin  Recent Labs   Lab 08/27/21 2241   PCT <0.05       Cardiac  Recent Labs   Lab 08/27/21 2241   TROP <0.045   PBNP 4,879*       Cr

## 2021-08-28 NOTE — PLAN OF CARE
Problem: Patient/Family Goals  Goal: Patient/Family Long Term Goal  Description: Patient's Long Term Goal: To feel better , have my breathing improved    Interventions:  - Cardiology Consult     IV Diuretics     Fluid management     Antibiotics      Ania rest.  Supplemental oxygen in use at 2 liters per Nasal Cannula  Goal: Achieves optimal ventilation and oxygenation  Continuous pulse oximetry in progress  Oxygen Saturation 97%  Crackles at bases  Lasix IV given  Description: INTERVENTIONS:  - Assess for Progressing     Problem: SKIN/TISSUE INTEGRITY - ADULT  BLE pink, open wound right lower extremity, non draining, open to air  Doppler pedal pulses bilaterally    Goal: Skin integrity remains intact  Description: INTERVENTIONS  - Assess and document risk f within ordered parameters to optimize cerebral perfusion and minimize risk of hemorrhage  - Monitor temperature, glucose, and sodium.  Initiate appropriate interventions as ordered  Outcome: Progressing

## 2021-08-28 NOTE — ED PROVIDER NOTES
Patient Seen in: [facility] Emergency Department      History   Patient presents with:  Difficulty Breathing    Stated Complaint: SOB    HPI/Subjective:   HPI    Patient 43-year-old female a history of coronary disease COPD hypertension heart failur Never used    Alcohol use: No      Comment: rarely    Drug use: No             Review of Systems    Positive for stated complaint: SOB  Other systems are as noted in HPI. Constitutional and vital signs reviewed.       All other systems reviewed and negativ Value    Glucose 129 (*)     BUN 62 (*)     Creatinine 1.69 (*)     Calculated Osmolality 303 (*)     GFR, Non- 27 (*)     GFR, -American 31 (*)     A/G Ratio 0.9 (*)     All other components within normal limits   PRO BETA NATRIURET LAVENDER   RAINBOW DRAW LIGHT GREEN   RAINBOW DRAW GOLD   BLOOD CULTURE   BLOOD CULTURE     XR CHEST AP PORTABLE  (CPT=71045)   Final Result    PROCEDURE:  XR CHEST AP PORTABLE  (CPT=71045)         TECHNIQUE:  AP chest radiograph was obtained.          COMP oximeter was ordered to monitor patient for hypoxia.     Admission disposition: 8/28/2021 12:33 AM       Patient vital signs reviewed she has a low-grade fever 100 Gy Fahrenheit chest x-ray limited quality was suggestive of bilateral basilar infiltrate nataly

## 2021-08-28 NOTE — ED INITIAL ASSESSMENT (HPI)
Patient here with c/o SOB from [facility] that started hours ago. Patient reports pain in bilateral legs and they appear red. Patient has harrison catheter in place from NH. Patient was given a nebulizer by ambulance PTA.   She was found to be 85% on r

## 2021-08-29 NOTE — PLAN OF CARE
Patient to Ultrasound for Kidnies , sent by bed.  notified of lab results for Uric Acid , Random Creatinine and Na. New orders noted.

## 2021-08-29 NOTE — PROGRESS NOTES
08/29/21 1357   Clinical Encounter Type   Visited With Patient   Routine Visit Introduction   Continue Visiting No  (pt will request future visits)   Referral From Nurse   Episcopal Encounters   Episcopal Needs Prayer   Patient Spiritual Encounters   Sp

## 2021-08-29 NOTE — PROGRESS NOTES
[facility] HOSPITALIST  Progress Note     Ether Hilda Patient Status:  Inpatient    1935 MRN [id number]   [facility] 2NE-A Attending [facility] Day # 1 PCP Abdirizak Friedman MD     Chief Complaint: SOB    S: Patient < > 1.69*  --  1.47*  --  1.80*   GFRAA 36*   < > 32*   < > 31*  --  37*  --  29*   GFRNAA 31*   < > 28*   < > 27*  --  32*  --  25*   CA 10.0   < > 9.5   < > 9.4  --  9.2  --  9.6   ALB 3.5  --  3.4  --  3.5  --   --   --   --    *   < > 137   < > acute HFpEF exacerbation. Clinically volume overloaded, CXR poor image, but possible R sided infiltrate. PCT negative  1. IV lasix  2. Empiric levaquin (PCN and cephalosporin allergies)  3. Follow cultures  4. Wean O2 as tolerated  5. Echo ordered  6.  Card

## 2021-08-29 NOTE — PLAN OF CARE
Problem: Patient/Family Goals  Reports feeling better after IV Lasix  Goal: Patient/Family Long Term Goal  Description: Patient's Long Term Goal: To feel better , have my breathing improved    Interventions:  - Cardiology Consult     IV Diuretics     Flu measures for life threatening arrhythmias  - Monitor electrolytes and administer replacement therapy as ordered  Outcome: Progressing     Problem: RESPIRATORY - ADULT  Supplemental oxygen in use at 2 liters per nasal cannula  Neb treatments in progress debi ordered  - Assess for signs and symptoms of hyperglycemia and hypoglycemia  - Administer ordered medications to maintain glucose within target range  - Assess barriers to adequate nutritional intake and initiate nutrition consult as needed  - Instruct courtney with patient/family  Outcome: Progressing     Problem: NEUROLOGICAL - ADULT  Alert and oriented x4  Intermittent periods of anxiety , asked to speak with pastoral care for prayer and comfort.     Goal: Achieves stable or improved neurological status  After

## 2021-08-29 NOTE — PROGRESS NOTES
[facility]      Cardiology Progress Note    Bobby Stevens Patient Status:  Inpatient    1935 MRN [id number]   [facility] 2NE-A Attending [facility] Day # 1 PCP Abdirizak Friedman MD     Subjective:  Continues w volume was markedly increased. 4. Right ventricle: The cavity size was normal. Systolic function was      normal. Systolic pressure was moderately increased. 5. Right atrium: The atrium was moderately to markedly dilated. 6. Tricuspid valve:  There wa antibiotics, nebs per PCP  3. Continue BB, statin, xaretlo  4. Start cardizem gtt for rate control. EDWIN Swenson  8/29/2021  6:41 AM    Addendum: Creatinine 1.8. continue IV lasix. Has seen Dr. Sukhwinder Branch in the past. May need renal consult.    9:08 A

## 2021-08-29 NOTE — PHYSICAL THERAPY NOTE
Consult received, chart reviewed. Spoke with pt, pt declined PT stating that MD advised her to take it easy since it was her first day. Discussed SpO2 levels and that minimal activity would be appropriate.   Pt reports in the chair for the majority of day

## 2021-08-29 NOTE — PLAN OF CARE
Pt. Alert and o x 4 , on O2 at 2 li ; pt. Does not want to use Cpap tonight ; easily get SOB even with conversation. C/o left leg pain , but refused pain meds. Tele shows Afib HR 90 to 130's ; HR elevated after nebs tx. Cont.  Monitor per tele/labs Spirometry  - Assess the need for suctioning and perform as needed  - Assess and instruct to report SOB or any respiratory difficulty  - Respiratory Therapy support as indicated  - Manage/alleviate anxiety  - Monitor for signs/symptoms of CO2 retention  Aniyah Savage

## 2021-08-29 NOTE — CONSULTS
[facility]  Report of Consultation    Sukiitzel Cadet Patient Status:  Inpatient    1935 MRN [id number]   [facility] 2NE-A Attending [facility] Day # 1 PCP Cierra Raman MD     Reason for Consultation:  JOSE TOTAL HIP REPLACEMENT     • TOTAL KNEE REPLACEMENT       Family History   Problem Relation Age of Onset   • Cancer Other         APPENDIX, NEPHEW, Pinky Fallen, NEPHEW   • Hypertension Mother    • Heart Disease Mother [de-identified]        CAD, CAUSE OF DEATH   • Cancer P Rectal, Daily PRN  •  ipratropium-albuterol (DUONEB) nebulizer solution 3 mL, 3 mL, Nebulization, Q6H WA  •  [START ON 8/30/2021] levoFLOXacin (LEVAQUIN) tab 500 mg, 500 mg, Oral, Q48H  •  pravastatin (PRAVACHOL) tab 20 mg, 20 mg, Oral, Nightly  •  rivarox 08/27/2021 1.69 (H)         Imaging:  Reviewed    Impression:  1. JOSE/CKD- baseline Cr <1.5- suspect realted to age/HTN/cardio-renal syndrome. JOSE in setting of CHF and related decreased renal perfusion.    She does not appear overtly volume overload, b

## 2021-08-30 NOTE — PLAN OF CARE
Assumed care of patient at [phone]. Patient alert and oriented x4. Vital signs stable, Afib on telemetry with rates controlled. Wearing 3L nasal cannula with sleep. Crackles heard in bilateral lungs. Patient denies any pain.  Nova in place draining yellow uri EKG if indicated  - Evaluate effectiveness of antiarrhythmic and heart rate control medications as ordered  - Initiate emergency measures for life threatening arrhythmias  - Monitor electrolytes and administer replacement therapy as ordered  Outcome: Progr

## 2021-08-30 NOTE — OCCUPATIONAL THERAPY NOTE
OCCUPATIONAL THERAPY EVALUATION - INPATIENT     Room Number: 5743/3639-T  Evaluation Date: 8/30/2021  Type of Evaluation: Initial  Presenting Problem: shortness of breath on exertion    Physician Order: IP Consult to Occupational Therapy  Reason for [facility]) REPLACEMENT     • TOTAL KNEE REPLACEMENT         OCCUPATIONAL PROFILE    HOME SITUATION  Type of Home: House  Home Layout: One level  Lives With: Alone    Toilet and Equipment: Standard height toilet;Grab bar  Shower/Tub and Equipment: Tub-shower combo the patient currently need…  -   Putting on and taking off regular lower body clothing?: A Lot  -   Bathing (including washing, rinsing, drying)?: A Lot  -   Toileting, which includes using toilet, bedpan or urinal? : A Little  -   Putting on and taking of activities of daily living, rest and sleep, work, leisure and social participation.      The patient is functioning below her previous functional level and would benefit from skilled inpatient OT to address the above deficits, maximizing patient’s ability t grooming ADLs at sink level. Writer and OT student Roly Ibarra wore PPE goggles, surgical mask and gloves throughout tx session. Pt wore mask during mobility out of room in session.

## 2021-08-30 NOTE — PROGRESS NOTES
Received pt at 1500. A&o x4. Hard of hearing. Afib on tele. VSS. Denies chest pain and SOB. On 3L O2 nasal cannula. CPAP at night. Up x2 with walker. Last BM 8/29. Nova in place for urinary retention.

## 2021-08-30 NOTE — PHYSICAL THERAPY NOTE
PHYSICAL THERAPY EVALUATION - INPATIENT     Room Number: 1634/6550-N  Evaluation Date: 8/30/2021  Type of Evaluation: Initial  Physician Order: PT Eval and Treat    Presenting Problem: SOB  Reason for Therapy: Mobility Dysfunction and Discharge Plann Lives With: Alone     Patient Owned Equipment: Rolling walker       Prior Level of [place]: Pt reports has been mostly in w/c at [facility], only ambulated once out in hallway. Prior to recent TAVR, pt was at home alone.     SUBJECTIVE  \"I had everyt Impairment: 46.58%   Standardized Score (AM-PAC Scale): 43.63   CMS Modifier (G-Code): CK    FUNCTIONAL ABILITY STATUS  Gait Assessment   Gait Assistance: Contact guard assist  Distance (ft): 15  Assistive Device: Rolling walker  Pattern: Shuffle assist at [facility] (was set to move into) [facility]. DISCHARGE RECOMMENDATIONS  PT Discharge Recommendations: Sub-acute rehabilitation    PLAN  PT Treatment Plan: Bed mobility; Body mechanics; Endurance; Energy conservation;Patient education; Fa

## 2021-08-30 NOTE — CM/SW NOTE
08/30/21 1400   CM/SW Referral Data   Referral Source Social Work (self-referral)   Reason for Referral Discharge planning;Psychosocial assessment   Informant Patient   Readmission Assessment   Was full assessment completed by SW/LADY on prior admission? Sarah Graham MSW, [facility]   / Discharge [facility].

## 2021-08-30 NOTE — PROGRESS NOTES
[facility]      Cardiology Progress Note    Patience Marina Patient Status:  Inpatient    1935 MRN [id number]   [facility] 2NE-A Attending [facility] Day # 2 PCP Nii Earl MD     Subjective:  Continues w The left atrium was massively dilated. The left atrial      volume was markedly increased. 4. Right ventricle: The cavity size was normal. Systolic function was      normal. Systolic pressure was moderately increased.    5. Right atrium: The atrium was mo insufficiency - creatinine 1.5 stable, GFR 36  · PATRICIA  · UTI - Abx    Plan:   1. Continue IV diuretics. Follow BMP. May need renal evaluation. Strict I/O, daily weights  2. IV antibiotics, nebs per PCP  3. Continue BB, statin, xaretlo  4.  Continue PO cardi

## 2021-08-30 NOTE — PLAN OF CARE
Problem: Patient/Family Goals  Discharge planning in progress  Discharge to [facility] AND [facility] landing when clinically stable [facility])  Goal: Patient/Family Long Term Goal  Able to ambulate in hallway with PT , see  note in Epic  Furosemide 80 mg IV BID  Cardiolo indicated  - Evaluate effectiveness of antiarrhythmic and heart rate control medications as ordered  - Initiate emergency measures for life threatening arrhythmias  - Monitor electrolytes and administer replacement therapy as ordered  Outcome: Progressing balance  Outcome: Progressing     Problem: METABOLIC/FLUID AND ELECTROLYTES - ADULT  Electrolyte replacement per Nephrology  , K 3.7    Goal: Glucose maintained within prescribed range  Description: INTERVENTIONS:  - Monitor Blood Glucose as ordered  - Ass as needed  - Advance activity as appropriate  - Communicate ordered activity level and limitations with patient/family  Outcome: Progressing     Problem: NEUROLOGICAL - ADULT  Alert and oriented x3  Anxious  Goal: Achieves stable or improved neurological s

## 2021-08-30 NOTE — PROGRESS NOTES
ABRIL HOSPITALIST  Progress Note     Samantha Sour Patient Status:  Inpatient    1935 MRN [id number]   [facility] 2NE-A Attending [facility] Day # 2 PCP Kings Solano MD     Chief Complaint: SOB    S: Patient --  153* 120*   BUN 63*   < > 62*   < > 63*  --   --  67* 65*   CREATSERUM 1.65*   < > 1.69*   < > 1.47*  --   --  1.80* 1.51*   GFRAA 32*   < > 31*   < > 37*  --   --  29* 36*   GFRNAA 28*   < > 27*   < > 32*  --   --  25* 31*   CA 9.5   < > 9.4   < > 9.2 ASSESSMENT / PLAN:     1. SOB and hypoxia - multifactorial 2/2 possible PNA and acute HFpEF exacerbation due to valvular issues  S/p mitral clip and mod severe TR  .  1. IV lasix  2. Empiric levaquin (PCN and cephalosporin allergies)  3.  Follow cultu seen and examined at bedside. Patient comfortably asleep however upon waking up she gets excited and gets short of breath. No overnight events or new complaints. Physical exam:  General: Patient awake and alert. In mild respiratory distress.   Lungs: CT

## 2021-08-30 NOTE — PLAN OF CARE
Received call from [address] [place] [address] Way work, Discharge planning in progress, patient to go to assisted living at discharge.   Facility to come tomorrow for an assessment at 10.30 am.

## 2021-08-30 NOTE — CARDIAC REHAB
Cardiac Rehab saw patient for HF education. No family present, but basic HF teaching and DC instructions reviewed with patient. Has binder, and stoplight reviewed with patient.

## 2021-08-31 NOTE — PLAN OF CARE
Problem: Patient/Family Goals  Discharge planning in progress  Assessment for Assisted living facility completed at bedside, patient in bed. Son on speaker phone of patient to participate in discussion.   Plan at this time for patient to go to [facility] in [place] Progressing  Goal: Absence of cardiac arrhythmias or at baseline  stable  Description: INTERVENTIONS:  - Continuous cardiac monitoring, monitor vital signs, obtain 12 lead EKG if indicated  - Evaluate effectiveness of antiarrhythmic and heart rate control comfort measures as appropriate  - Advance diet as tolerated, if ordered  - Obtain nutritional consult as needed  - Evaluate fluid balance  Outcome: Progressing     Problem: METABOLIC/FLUID AND ELECTROLYTES - ADULT  Stable per lab draw  Goal: Glucose maint ambulation using safe patient handling equipment as needed  - Ensure adequate protection for wounds/incisions during mobilization  - Obtain PT/OT consults as needed  - Advance activity as appropriate  - Communicate ordered activity level and limitations wi

## 2021-08-31 NOTE — CM/SW NOTE
SW met with pt, Harman Palomino and William Mcduffie from [facility] assisted [facility]. Pt's son, Pee Lezama, was also on the phone. Pt and son made aware by [facility] that pt has to go back for more ELAINE before moving into the assisted living. Both pt and son are in agreement.  Hortensia Christie

## 2021-08-31 NOTE — PROGRESS NOTES
[facility]  Nephrology Progress Note    Denise Gomez Attending:  Agata Mcdowell*       Assessment and Plan:    1) JOSE / CKD- baseline Cr approx 1.5 mg/dl due to longstanding HTN / age-related nephrosclerosis / cardiorenal syndrome; does dilTIAZem (DILACOR XR) 24 hr cap 120 mg, 120 mg, Oral, BID  dilTIAZem (cardIZEM) tab 30 mg, 30 mg, Oral, 4 times per day  dilTIAZem HCl (cardIZEM) injection 10 mg, 10 mg, Intravenous, Q2H PRN  furosemide (LASIX) injection 80 mg, 80 mg, Intravenous, BID Jack Kauffman

## 2021-08-31 NOTE — DIETARY NOTE
[facility]   CLINICAL NUTRITION    Valjean Danger     Admitting diagnosis:  Atrial fibrillation with rapid ventricular response ([facility] [address] [I48.91]  HCAP (healthcare-associated pneumonia) [J18.9]  Acute congestive heart failure, unspecified heart failure

## 2021-08-31 NOTE — PLAN OF CARE
Assumed care of patient around [address]. Alert and oriented x4. Denies any chest pain or lightheadedness. Patient short of breath at rest- On 3L NC, O2 sat 97%- pt attempted to use CPAP this evening but did not tolerate well. PRN neb x1 overnight.  Afib on tele Assess for changes in respiratory status  - Assess for changes in mentation and behavior  - Position to facilitate oxygenation and minimize respiratory effort  - Oxygen supplementation based on oxygen saturation or ABGs  - Provide Smoking Cessation handout activity tolerance for standing, transferring and ambulating w/ or w/o assistive devices  - Assist with transfers and ambulation using safe patient handling equipment as needed  - Ensure adequate protection for wounds/incisions during mobilization  - Corinne Curie

## 2021-08-31 NOTE — PROGRESS NOTES
[facility]  Progress Note    Antonio Guerrero Patient Status:  Inpatient    1935 MRN [id number]   [facility] 2NE-A [facility] PCP Favio Mullins MD     Still having problems with her breathin bowel sounds. No rebound tenderness   Neurologic: No focal neurological deficits. Musculoskeletal: Full range of motion of all extremities. Trace edema   Integument: No lesions. No erythema. Psychiatric: Appropriate mood and affect.     Recent Labs     0

## 2021-08-31 NOTE — PROGRESS NOTES
[facility]    Cardiology Progress Note    Celina Schirmer Patient Status:  Inpatient    1935 MRN [id number]   [facility] 2NE-A Attending [address] [address] [address] Day # 3 PCP Reta Trejo MD     Jenaantonino Romeo volume was markedly increased. 4. Right ventricle: The cavity size was normal. Systolic function was      normal. Systolic pressure was moderately increased. 5. Right atrium: The atrium was moderately to markedly dilated. 6. Tricuspid valve:  There wa xarleto  · Acute on chronic HFrEF, LVEF 60-65%.   Good urine output with IV lasix, net -3435  · Large hiatal hernia  · HTN - sbp , no dizziness  · Hyperlipidemia - statin  · JOSE/Chronic renal insufficiency - creatinine 1.39 stable, BUN 59  · PATRICIA  · UT

## 2021-08-31 NOTE — PROGRESS NOTES
[facility] HOSPITALIST  Progress Note     Al Núñez Patient Status:  Inpatient    1935 MRN [id number]   [facility] 2NE-A Attending rene [facility] Day # 3 PCP Man Capellan MD     Chief Complaint: SOB    S: Patient > 1.80* 1.51* 1.39*   GFRAA 32*   < > 31*   < > 29* 36* 40*   GFRNAA 28*   < > 27*   < > 25* 31* 35*   CA 9.5   < > 9.4   < > 9.6 9.6 9.0   ALB 3.4  --  3.5  --   --   --   --       < > 137   < > 132* 139 137   K 3.7   < > 3.5   < > 5.4* 3.7 3.3*   C cephalosporin allergies)  3. Follow cultures NGTD   4. Wean O2 as tolerated  5. Echo reviewed  PEF , mod- severe TR, s/p mitral clip    6. Cardiology  Following   7. Nebs as needed   2. Leukocytosis  No fevers  3.  Abnormal UA - likely asymptomatic bacterur

## 2021-08-31 NOTE — PROGRESS NOTES
[facility] Note:  Renal Adjustment for levofloxacin (LEVAQUIN)    Neil Sigala is a 81year old patient who has been prescribed levofloxacin (LEVAQUIN) 500 mg every 48 hrs.   The estimated creatinine clearance is 22.3 mL/min (A) (based on SCr of 1.39

## 2021-09-01 NOTE — PLAN OF CARE
Problem: Patient/Family Goals  Discharge planning in progress  Plan for discharge to [facility] , then transition to assisted living  Goal: Patient/Family Long Term Goal  Description: Patient's Long Term Goal: To feel better , have my breathing improved    Inte emergency measures for life threatening arrhythmias  - Monitor electrolytes and administer replacement therapy as ordered  Outcome: Progressing     Problem: RESPIRATORY - ADULT  Reports intermittent dyspnea at rest and with exertion  Periods of anxiety, ex range  Description: INTERVENTIONS:  - Monitor Blood Glucose as ordered  - Assess for signs and symptoms of hyperglycemia and hypoglycemia  - Administer ordered medications to maintain glucose within target range  - Assess barriers to adequate nutritional i limitations with patient/family  Outcome: Progressing     Problem: NEUROLOGICAL - ADULT  Alert and oriented, appears anxious  Reports not sleeping well last night  Goal: Achieves stable or improved neurological status  Description: INTERVENTIONS  - Assess

## 2021-09-01 NOTE — CONSULTS
Pulmonary / Critical Care H&P/Consult       NAME: Rigoberto Jolly - ROOM: [facility] MRN: [id number] - Age: 81year old - :  1935    Date of Admission: 2021 10:24 PM  Admission Diagnosis: Atrial fibrillation with rapid ventricular response Unknown             Other reaction(s): Rash  Lisinopril                  Comment:Other reaction(s):  Other             Turn red             Other reaction(s): LISINOPRIL  Sulfa Antibiotics           Comment:Other reaction(s): Unknown             Other react of Communication with Friends and Family:       Frequency of Social Gatherings with Friends and Family:       Attends Congregation Services:       Active Member of Clubs or Organizations:       Attends Club or Organization Meetings:       Marital Status:    In Scheduled Medication:  • budesonide  0.5 mg Nebulization 2 times daily   • levoFLOXacin  750 mg Oral Q48H   • dilTIAZem  120 mg Oral BID   • furosemide  80 mg Intravenous BID (Diuretic)   • ipratropium-albuterol  3 mL Nebulization Q6H WA   • toshia or deformity   Heart:    irregular, S1 and S2 normal, no murmur, rub   or gallop   Abdomen:     Soft, non-tender, bowel sounds active all four quadrants,     no masses, no organomegaly   Extremities:   1+ BLE edema   Pulses:   2+ and symmetric all extremit

## 2021-09-01 NOTE — PROGRESS NOTES
[address] [facility] Cardiology Progress Note    Tigist Osullivan Patient Status:  Inpatient    1935 MRN [id number]   [facility] 2NE-A Attending Yahir [facility] Day # 4 PCP Annette Blevins MD     Subjective:  No acute debi Diagnostics:             Physical Exam:       Constitutional: She is oriented to person, place, and time and thin. No distress. Cardiovascular: An irregularly irregular rhythm present. Edema ( 1+ BLE eema) present. Pulmonary/Chest: She has no wheezes. benefit from consultation with psychiatry for evaluation and treatment of anxiety.     Appreciate social work and  assistance for discharge planning.  Genet Hi MD

## 2021-09-01 NOTE — PROGRESS NOTES
[facility]  Progress Note     Rea Camara Patient Status:  Inpatient    1935 MRN [id number]   [facility] 2NE-A Attending Gary Crane MD    Hosp Day # 4 PCP Shabnam Stephens MD     Chief Complaint: SOB    S: Patient  Had rou > 129*   < > 120* 104* 117*   BUN 63*   < > 62*   < > 65* 59* 56*   CREATSERUM 1.65*   < > 1.69*   < > 1.51* 1.39* 1.31*   GFRAA 32*   < > 31*   < > 36* 40* 43*   GFRNAA 28*   < > 27*   < > 31* 35* 37*   CA 9.5   < > 9.4   < > 9.6 9.0 9.3   ALB 3.4  --  3. severe TR    1. IV lasix  2. Empiric levaquin (PCN and cephalosporin allergies)  3. Follow cultures NGTD   4. Wean O2 as tolerated  5. Echo reviewed  PEF , mod- severe TR, s/p mitral clip    6. Cardiology  Following   7.  Nebs as needed   8. pulm consult

## 2021-09-01 NOTE — PROGRESS NOTES
[facility]  Progress Note    Everton Heart Patient Status:  Inpatient    1935 MRN [id number]   [facility] 2NE-A [facility] Day [facility] 4 PCP Tonie Baxter MD     Used cpap overnight; back on NC this am sound @ bases; no wheezing  Cardiovascular: S1, S2.  Regular rate and rhythm. No murmurs. Equal pulses   Abdomen: Soft, nontender, nondistended. Positive bowel sounds. No rebound tenderness   Neurologic: No focal neurological deficits.    Musculoskeletal:

## 2021-09-01 NOTE — PROGRESS NOTES
Received pt at 1500. A&o x4. Hard of hearing. Afib on tele. VSS. Denies chest pain and SOB. On 2L O2 nasal cannula. CPAP at night. Up x2 with walker. Last BM 8/29. Nova in place for urinary retention. Pt very anxious about potential discharge.

## 2021-09-01 NOTE — CM/SW NOTE
Voicemail left with Galina at [place] at [facility] to confirm that she received referral through fax. Await call back. Per discharge rounds, no discharge today.     Jared Wetzel MSW, [facility]   / Discharge Planner  [id number]

## 2021-09-01 NOTE — PLAN OF CARE
Alert and o x 4 , anxious. Still c/o SOB with mild activity ; on O2 at 2 li/min via nc. Tele shows Afib on the monitor. Denies any pain or discomfort. Cont. Monitor per tele/labs/v/s.  Instructed to call staff when needed help , placed call light indicated  - Manage/alleviate anxiety  - Monitor for signs/symptoms of CO2 retention  Outcome: Progressing     Problem: METABOLIC/FLUID AND ELECTROLYTES - ADULT  Goal: Glucose maintained within prescribed range  Description: INTERVENTIONS:  - Monitor Blood sodium.  Initiate appropriate interventions as ordered  Outcome: Progressing     Problem: Diabetes/Glucose Control  Goal: Glucose maintained within prescribed range  Description: INTERVENTIONS:  - Monitor Blood Glucose as ordered  - Assess for signs and sym applicable  - Encourage broncho-pulmonary hygiene including cough, deep breathe, Incentive Spirometry  - Assess the need for suctioning and perform as needed  - Assess and instruct to report SOB or any respiratory difficulty  - Respiratory Therapy support changes in neurological status  - Initiate measures to prevent increased intracranial pressure  - Maintain blood pressure and fluid volume within ordered parameters to optimize cerebral perfusion and minimize risk of hemorrhage  - Monitor temperature, gluc

## 2021-09-01 NOTE — PHYSICAL THERAPY NOTE
PHYSICAL THERAPY TREATMENT NOTE - INPATIENT    Room Number: 3510/3659-E     Session: 1   Number of Visits to Meet Established Goals: 3     History related to current admission: Pt was admitted from home on 8/27/2021 with SOB.  Pt has a past medical history alarm    WEIGHT BEARING RESTRICTION  Weight Bearing Restriction: None                PAIN ASSESSMENT   Rating: Unable to rate  Location: R knee  Management Techniques: Activity promotion; Body mechanics; Relaxation;Repositioning    BALANCE repeatedly stating \"I just want to walk. That is my goal!\" however not willing to take steps to complete. Pt appears mostly limited by anxiety at this time.        THERAPEUTIC EXERCISES  Lower Extremity Ankle pumps  Quad sets     Upper Extremity Elbow f

## 2021-09-01 NOTE — CM/SW NOTE
SW received call from [facility] at [place] at [facility]". SW was informed that pt is clinically accepted there for ELAINE. Pt will have to get a rapid COVID test prior to admitting there. Will inform pt of the update today.      Tameka Brush MSW, LSW  [facility]

## 2021-09-02 NOTE — PROGRESS NOTES
[facility]  Progress Note     Belkis Tiwari Patient Status:  Inpatient    1935 MRN [id number]   [facility] 2NE-A Attending Jael Banda MD    Hosp Day # 5 PCP Yenifer Arellano MD     Chief Complaint: SOB    S: Patient  C/o ganesh 1.43*   GFRAA 32*   < > 31*   < > 40* 43* 39*   GFRNAA 28*   < > 27*   < > 35* 37* 33*   CA 9.5   < > 9.4   < > 9.0 9.3 9.7   ALB 3.4  --  3.5  --   --   --   --       < > 137   < > 137 136 137   K 3.7   < > 3.5   < > 3.3* 3.9 3.5   CL 99   < > 99 2/2 possible PNA and acute HFpEF exacerbation due to valvular issues  S/p mitral clip and mod severe TR    1. Po demadex now   2. Empiric levaquin (PCN and cephalosporin allergies)  3. Follow cultures NGTD   4. Wean O2 as tolerated  5.  Echo reviewed  PEF , bothering her son .  Will reach out to him w/ update   Was accepted at  [place] [phone] her cpap for > 45 hrs last noc   Spoke to Meet the son -  He will check her COG status he thought she was DNAR  He will check     Quality:  · DVT Prophylaxis: Lyndon [place]

## 2021-09-02 NOTE — TELEPHONE ENCOUNTER
Pt is in hospital, has outpt lymphedema therapy scheduled for next week. Contacted pt- pt's plan for discharged is acute rehab facility, informed pt she cannot out outpt lymphedema therapy if she is in rehab, SNF, or getting home health.  Discussed w/ pt th

## 2021-09-02 NOTE — CONSULTS
[facility]  Report of Psychiatric Consultation    Chapo Bhatti Patient Status:  Inpatient    1935 MRN [id number]   [facility] 2NE-A [facility] PCP Keyona Thompson MD     Date of Admission: worsened 5 yrs ago. Aly Mosley MD  9/1/2021  7:09 PM    History of Present Illness:  82 yo fiercely independent woman with hx of COPD.  PATRICIA, A-fib, HTN. HLD, and MR s/p mitral clip 8/13/21 was admitted on 8/27/21 for management of acute on chronic HFp with alcohol use disorder. Social and Developmental History:  with 2 sons. She was very involved in her community ([address] [place] [address]) for many years.      Past Medical History:   Diagnosis Date   • A-fib [facility])    • Anxiety state, unspecified    • Arrhythmia Comment:Other reaction(s): Unknown             Other reaction(s): Rash  Lisinopril                  Comment:Other reaction(s):  Other             Turn red             Other reaction(s): LISINOPRIL  Sulfa Antibiotics           Comment:Other reaction(s): Unkn Objective       09/01/21  1738   BP:    Pulse: 63   Resp: 18   Temp:      Appearance: fair grooming  Behavior: normal psychomotor  Attitude: guarded intiially, cooperative  Gait: not observed    Speech: normal rate, rhythm and volume    Mood: depressed

## 2021-09-02 NOTE — PROGRESS NOTES
[facility]  Progress Note    Rea Camara Patient Status:  Inpatient    1935 MRN [id number]   [facility] 2NE-A Attending Alfa Mcclendon [facility] Day # 5 PCP Shabnam Stephens MD     Chart reviewed; no acute events      bu EOM-I. PERRL  Neck: No lymphadenopathy. + JVD    Respiratory: decreased breath sound @ bases; no wheezing  Cardiovascular: S1, S2.  Regular rate and rhythm. No murmurs. Equal pulses   Abdomen: Soft, nontender, nondistended. Positive bowel sounds.  No hoang

## 2021-09-02 NOTE — PROGRESS NOTES
Pulmonary Progress Note        NAME: Samantha Perez - ROOM: 7324/2417-X - MRN: [id number] - Age: 81year old - : 1935        Last 24hrs: No events overnight, feels nauseated this AM, somewhat amotivated because she doesn't feel well    OBJECTIVE: therapy per cards  - on levaquin for uti which would also be good coverage for pna as well. 2. Copd:   - on pulmicort and scheduled bd; would hold on systemic steroids for now  -cont metanebs  3. Nausea  -zofran  4.  Titi: cont cpap     Katharyn Leyden McElligott DM

## 2021-09-02 NOTE — PLAN OF CARE
Pt. used her own cpap mask , slept well , not in any form of distress. Denies any pain or discomfort. Easily gets anxious , then start having SOB. Tele shows Afib on the monitor. Cont. Monitor per tele/labs/v/s.  Fall/safety precautions instructed , support as indicated  - Manage/alleviate anxiety  - Monitor for signs/symptoms of CO2 retention  Outcome: Progressing     Problem: GASTROINTESTINAL - ADULT  Goal: Minimal or absence of nausea and vomiting  Description: INTERVENTIONS:  - Maintain adequate h INTERVENTIONS:  - Assess patient stability and activity tolerance for standing, transferring and ambulating w/ or w/o assistive devices  - Assist with transfers and ambulation using safe patient handling equipment as needed  - Ensure adequate protection fo

## 2021-09-02 NOTE — PROGRESS NOTES
[facility]  Progress Note     Neil Sigala Patient Status:  Inpatient    1935 MRN [id number]   [facility] 2NE-A Attending Natty Roy MD    Hosp Day # 5 PCP Tuna De Oliveira MD     Chief Complaint: SOB    S: Patient  Had rou < > 129*   < > 104* 117* 119*   BUN 63*   < > 62*   < > 59* 56* 54*   CREATSERUM 1.65*   < > 1.69*   < > 1.39* 1.31* 1.43*   GFRAA 32*   < > 31*   < > 40* 43* 39*   GFRNAA 28*   < > 27*   < > 35* 37* 33*   CA 9.5   < > 9.4   < > 9.0 9.3 9.7   ALB 3.4  -- 50 mg Oral BID   • pantoprazole  20 mg Oral BID AC       ASSESSMENT / PLAN:     1. SOB and hypoxia - multifactorial 2/2 possible PNA and acute HFpEF exacerbation due to valvular issues  S/p mitral clip and mod severe TR    1. IV lasix  2.  Empiric levaquin b/l  Soft, NT, ND, +BS  Trace edema    Cont diuretics, psych and pulm eval.    Ashley Godinez MD  9/2/2021

## 2021-09-02 NOTE — PROGRESS NOTES
PSYCH CONSULT    Date of Admission: 8/27/21  Date of Consult: 9/1/21  Reason for Consultation: Anxiety    Impression: She feels anxious and angry and depressed because of the loss of control and need to be dependent on others.  Her anxiety worsens her dyspn

## 2021-09-02 NOTE — PLAN OF CARE
Received bedside report on this Pt., at 1600. Pt., awake, A&Ox4, stated she, \"feel anxious\". Consult for Dr. Rubio Weiss, paged and notified him, received message from him stating he'll see Pt.  Pt., is in AFib on Tele monitor, sats greater than 92% on 2L Oxyge

## 2021-09-02 NOTE — PROGRESS NOTES
[facility]  Report of Psychiatric Progress Note    Heather Donahue Patient Status:  Inpatient    1935 MRN [id number]   [facility] 2NE-A Attending Alyx [address] Day # 6 PCP Ken Meng MD     Date of Admission Present Illness:  82 yo fiercely independent woman with hx of COPD. PATRICIA, A-fib, HTN. HLD, and MR s/p mitral clip 8/13/21 was admitted on 8/27/21 for management of acute on chronic HFpEF and OJSE on CKD. She first talks about herself.  She has always been Anxiety and depressive disorder. Substance Use History: Ex-cigarette smoker. Psych Family History: Father with alcohol use disorder. Social and Developmental History:  with 2 sons.  She was very involved in her community ([address] [place] [address]) for many Comment:Other reaction(s): Unknown             Other reaction(s): Rash  Cephalosporins              Comment:Other reaction(s): Unknown             Other reaction(s): Rash  Lisinopril                  Comment:Other reaction(s):  Other             Turn Positive for malaise/fatigue. Psychiatric/Behavioral: Positive for depression. Negative for suicidal ideas. The patient is nervous/anxious.       Mental Status Exam:     Objective       09/02/21  1705   BP: 92/47   Pulse: 50   Resp: 19   Temp: 98 °F (36.7 upper pole left kidney measuring up to 0.9 cm, with a solid renal mass not entirely excluded.  Dedicated renal ultrasound is suggested for further evaluation.

## 2021-09-02 NOTE — PROGRESS NOTES
Frustrated regarding timing of her meals, poor sleep and the general difficulties of being in the hospital - looking forward to discharge    Harsh upper airway cough this am - mildly dyspneic at rest    Afebrile  100/60  65 irregular    Lungs hyperinflated

## 2021-09-03 NOTE — PROGRESS NOTES
[facility] Progress Note    Abbieylyn Showers Patient Status:  Inpatient    1935 MRN [id number]   [facility] 2NE-A Attending [facility] Day # 6 PCP Bryanna Bro MD     Subjective:  No acute debi distress. Cardiovascular: An irregularly irregular rhythm present. Edema ( 1+ BLE eema) present. Pulmonary/Chest: She has no wheezes. She has rales ( LLL). Conversational dyspnea   Abdominal: Soft.    Neurological: She is alert and oriented to person,

## 2021-09-03 NOTE — PROGRESS NOTES
[facility]  Progress Note     Germain Alba Patient Status:  Inpatient    1935 MRN [id number]   [facility] 2NE-A Attending Jelena Broussard MD    Hosp Day # 6 PCP Lacey Sykes MD     Chief Complaint: SOB    S: Patient   C/o  33* 30*   CA 9.4   < > 9.3 9.7 9.6   ALB 3.5  --   --   --   --       < > 136 137 136   K 3.5   < > 3.9 3.5 3.8   CL 99   < > 104 101 101   CO2 31.0   < > 28.0 31.0 30.0   ALKPHO 105  --   --   --   --    AST 19  --   --   --   --    ALT 22  --   -- 3. Follow cultures NGTD   4. Wean O2 as tolerated  5. Echo reviewed  PEF , mod- severe TR, s/p mitral clip    6. Cardiology  Peripheral over w/e   7. Nebs as needed   8. pulm  Following    9.  took xanax today seems to be helping    2.  Leukocytosis  No f 61   Temp 97.7 °F (36.5 °C) (Oral)   Resp 20   Ht 5' 1\" (1.549 m)   Wt 125 lb (56.7 kg)   SpO2 99%   BMI 23.62 kg/m²   AOx3, NAD  S1+S2, RRR  Diminished BS b/l  Soft, NT, ND, +BS  No LE edema    Cont diuretics, on PO, abx, wean oxygen, dc planning.     Dur

## 2021-09-03 NOTE — PLAN OF CARE
Received patient at [address]. Patient A/O x 4.  AFib on tele, HR : 40-50s. Held metoprolol and cardizem this evening. VSS. O2 saturation 96% CPAP overnight, 1-2L during day, wean to RA as tolerated. No C/O chest pain or SOB.  Nova in place, draining concen Progressing  9/3/2021 0434 by Yoshi Galaviz RN  Outcome: Progressing  Goal: Absence of cardiac arrhythmias or at baseline  Description: INTERVENTIONS:  - Continuous cardiac monitoring, monitor vital signs, obtain 12 lead EKG if indicated  - Evaluate eff

## 2021-09-03 NOTE — CM/SW NOTE
BOUBACAR spoke with Kermit Iraheta at [facility]. Anticipate discharge Saturday. Kermit Iraheta confirms that they do accept admissions on weekends. Updates faxed to [phone]. Informed RN of needed rapid COVID test prior to discharge.  Pt will require BLS at discharge

## 2021-09-03 NOTE — PROGRESS NOTES
09/03/21 1723   Clinical Encounter Type   Visited With Patient   Routine Visit Introduction   Continue Visiting No   Nurse said that patient is not decisional and will be leaving tomorrow. Nurse advised  to ignore the request for now.

## 2021-09-03 NOTE — PROGRESS NOTES
Pulmonary Progress Note        NAME: Chapo Bhatti - ROOM: 4600/5108-H - MRN: [id number] - Age: 81year old - : 1935        Last 24hrs: No events overnight, pt reiterated her objection to metaneb this AM, finds CPAP treatments to be much gentler, overload and atelectasis from hiatal hernia and inactivity  - diuretic therapy per cards  - on levaquin for uti which would also cover PNA  -encourage IS and up OOB  2.  Copd:   - on pulmicort and scheduled bd; would hold on systemic steroids for now  - pt

## 2021-09-03 NOTE — PROGRESS NOTES
[facility]  Progress Note    Valjean Danger Patient Status:  Inpatient    1935 MRN [id number]   [facility] 2NE-A Attending [facility] Day # 6 PCP Hallie Jolly MD     No acute events  Continues to feel anxi 875 ml   Net -575 ml       General: No acute distress. Alert and oriented x 3. HEENT: Moist mucous membranes. EOM-I. PERRL  Neck: No lymphadenopathy.   + JVD    Respiratory: decreased breath sound @ bases; no wheezing  Cardiovascular: S1, S2.  Regular rate

## 2021-09-03 NOTE — PROGRESS NOTES
[facility]  Report of Psychiatric Progress Note    Denise Gomez Patient Status:  Inpatient    1935 MRN [id number]   [facility] 2NE-A Attending Agata Herndon [facility] Day # 7 PCP Tamika Reynoso MD     Date of Admission independent woman with hx of COPD. PATRICIA, A-fib, HTN. HLD, and MR s/p mitral clip 8/13/21 was admitted on 8/27/21 for management of acute on chronic HFpEF and JOSE on CKD. She first talks about herself.  She has always been a hard worker who was very invol coming late. She feels tired and anxious and irritable and depressed. Per RN, she tolerated the Xanax 0.25mg this AM and appeared more relaxed after taking it. No voices/visions. Past Psychiatric History: Anxiety and depressive disorder.      Substance smoking about 63 years ago. She has a 15.00 pack-year smoking history. She has never used smokeless tobacco. She reports that she does not drink alcohol and does not use drugs.     Allergies:    Amoxicillin                 Comment:Other reaction(s): Unknown bisacodyl (DULCOLAX) rectal suppository 10 mg, 10 mg, Rectal, Daily PRN  •  pravastatin (PRAVACHOL) tab 20 mg, 20 mg, Oral, Nightly  •  rivaroxaban (XARELTO) tab 15 mg, 15 mg, Oral, Daily  •  metoprolol tartrate (LOPRESSOR) tab 50 mg, 50 mg, Oral, BID  • lobe with associated mucous plugging.  Superimposed pneumonia is not excluded.  Clinical correlation recommended.       3. Medium-sized bilateral pleural effusions.       4. Large hiatal hernia containing portions of the stomach and colon without obstructi

## 2021-09-03 NOTE — PHYSICAL THERAPY NOTE
PHYSICAL THERAPY TREATMENT NOTE - INPATIENT    Room Number: 3153/0381-J     Session: 2   Number of Visits to Meet Established Goals: 3     History related to current admission: Pt was admitted from home on 8/27/2021 with SOB.  Pt has a past medical history stronger. OBJECTIVE  Precautions: Bed/chair alarm    WEIGHT BEARING RESTRICTION  Weight Bearing Restriction: None                PAIN ASSESSMENT   Rating: Unable to rate  Location: R knee  Management Techniques: Activity promotion; Body mechanics; Relaxat through each bout of activity due to SOB. Pt with O2 sats at 94% and above throughout session. Patient End of Session: Up in chair;Needs met;Call light within reach;RN aware of session/findings; All patient questions and concerns addressed    ASSESSME

## 2021-09-04 NOTE — PLAN OF CARE
Problem: Patient/Family Goals  Discharge planning for today to [facility]hab in [place]  Son at bedside  Plan of care updated with patient    Goal: Patient/Family Long Term Goal  States she is feeling better  Oxygen Saturation stable with supplemental oxygen in vital signs, obtain 12 lead EKG if indicated  - Evaluate effectiveness of antiarrhythmic and heart rate control medications as ordered  - Initiate emergency measures for life threatening arrhythmias  - Monitor electrolytes and administer replacement therap INTERVENTIONS  - Assess for signs and symptoms of bleeding or hemorrhage  - Monitor labs and vital signs for trends  - Administer supportive blood products/factors, fluids and medications as ordered and appropriate  - Administer supportive blood products/f management of diabetes  Outcome: Adequate for Discharge

## 2021-09-04 NOTE — PLAN OF CARE
Report called to Yazmin Sethi , Nurse at receiving facility, [facility], Request medication list , faxed to number given. Signed prescription for Xanax along with Results of SARS Covid-19 are included in discharge transfer papers.

## 2021-09-04 NOTE — PROGRESS NOTES
[facility]     Nephrology Progress Note    Marvin Lester Patient Status:  Inpatient    1935 MRN [id number]   [facility] 2NE-A Attending Renetta Alberts [facility] Day # 7 PCP Junie Espana MD       SUBJECTIVE:  Verena Cardoza .0 242.0       Recent Labs   Lab 08/30/21  0729 08/30/21  0729 08/31/21  0744 09/01/21  0632 09/02/21  0645 09/03/21  0644 09/04/21  0644      < > 137 136 137 136 133*   K 3.7   < > 3.3* 3.9 3.5 3.8 3.9      < > 103 104 101 101 106   C mg, Oral, Daily  pantoprazole (PROTONIX) EC tab 20 mg, 20 mg, Oral, BID AC          Impression/Plan:    1. JOSE/CKD- baseline Cr close to 1.3 mg/dl or so- suspect realted to age/HTN/cardio-renal syndrome.    JOSE in setting of CHF and related decreased renal

## 2021-09-04 NOTE — PROGRESS NOTES
[address] Cardiology Progress Note    Valjean Danger Patient Status:  Inpatient    1935 MRN [id number]   [facility] 2NE-A Attending [facility] Day # 7 PCP Hallie Jolly MD     Subjective:  No acute debi Medications:  • ALPRAZolam  0.25 mg Oral TID   • metoprolol tartrate  25 mg Oral 2x Daily(Beta Blocker)   • torsemide  20 mg Oral Daily   • eplerenone  25 mg Oral Daily   • Miconazole Nitrate   [email or web address] Stylo   • ipratropium-albuterol  3 mL Nebuli

## 2021-09-04 NOTE — PLAN OF CARE
Received patient at [address]. Patient A/O x 4, drowsy/lethargic this evening. AFib on tele. O2 saturation 94% on CPAP overnight, 2L during day. No C/O chest pain or SOB. Pt c/o hallucinating. Orientation questions asked and reoriented patient.  Expressed sh cardiac arrhythmias or at baseline  Description: INTERVENTIONS:  - Continuous cardiac monitoring, monitor vital signs, obtain 12 lead EKG if indicated  - Evaluate effectiveness of antiarrhythmic and heart rate control medications as ordered  - Initiate mihir

## 2021-09-04 NOTE — PROGRESS NOTES
Pulmonary Progress Note      NAME: Rebecca Wild - ROOM: 5850/2765-Z - MRN: [id number] - Age: 81year old - : 1935    Assessment/Plan:  1.  Dyspnea: due to COPD, fluid overload and atelectasis from hiatal hernia and inactivity  - diuretic therapy 08/31/21  0744   RBC 3.85   HGB 11.1*   HCT 35.4   MCV 91.9   MCH 28.8   MCHC 31.4   RDW 16.2   NEPRELIM 6.23   WBC 7.8   .0     Recent Labs   Lab 09/02/21  0645 09/03/21  0644 09/04/21  0644   * 121* 111*   BUN 54* 58* 54*   CREATSERUM 1.43*

## 2021-09-04 NOTE — PROGRESS NOTES
[facility]RIL HOSPITALIST  Progress Note     Heather Donahue Patient Status:  Inpatient    1935 MRN [id number]   [facility] 2NE-A Attending Sonia Terry MD    Hosp Day # 7 PCP Ken Meng MD     Chief Complaint: SOB    S: Patient resting in the last 168 hours.          COVID-19 Lab Results    COVID-19  Lab Results   Component Value Date    COVID19 Not Detected 09/04/2021    COVID19 Not Detected 08/27/2021    COVID19 Not Detected 08/19/2021       Pro-Calcitonin  No results for input(s): PCT 6. Afib  1. Xarelto, metoprolol  2. Po cardizem   7. HTN  1. Hold spironolactone  2. BB, inspra   8. HLD  1. Statin  9.  dry eyes   Saline gtts ordered   10. Hiatal hernia  1.  PPI    Plan of care: dc planning      WILL NEED RAPID COVID TEST PRIOR TO [address] [address]

## 2021-09-04 NOTE — CM/SW NOTE
BOUBACAR s/w Galina at [place] at [facility] to confirm that they have a bed for pt today, they will require a rapid covid test prior to admission. Discussed with unit RN. BOUBACAR will coordinate angelina Mojica's ph# [phone], MYNOR#[phone]    [place] at [address] [facility]

## 2021-09-04 NOTE — BH PROGRESS NOTE
Assumed care at [phone] patient  Alert oriented x 4; follows commands . deny pain deny dyspnea 02 at 2 liters / nasal cannula spo2 =93%  Nova catheter intact  Patient  Dressed up and ready for discharge to [facility]; waiting for ambulance transport

## 2021-09-14 NOTE — PROGRESS NOTES
Subjective:   Patient presents for 1month postop TMVR visit, in wheelchair - accompanied by   her son. She remains on chronic O2 per NC–4 L, has not required any increasing O2. She is currently still at rehab at the [facility].    She underwent bilateral lower extremity edema/chronic bilateral lymphedema, right lower extremity weeping wound with Mepilex  Neurologic: AAO x 3  Skin: groin incision CDI, well healed.   Generalized ecchymoses upper extremities chronic venous stasis skin changes lower e 08/31/2021    .0 08/31/2021    MPV 8.7 10/19/2018         Medications:  Current Outpatient Medications   Medication Sig Dispense Refill   • melatonin 3 MG Oral Tab Take one tab nightly prn 30 tablet 0   • ALPRAZolam 0.25 MG Oral Tab Take 1 tablet (0 Multiple Vitamins-Minerals (CENTRUM SILVER) Oral Tab Take  by mouth. Take one tablet by mouth daily     • Vitamin D3 (VITAMIN D3) 2000 UNITS Oral Cap Take  by mouth.  take 1 Capsule by Oral route  every day           Assessment:  · s/p Mitraclip placement 8

## 2021-09-16 NOTE — DISCHARGE SUMMARY
ABRIL HOSPITALIST  DISCHARGE SUMMARY     Wilmer Reyes Patient Status:  Inpatient    1935 MRN [id number]   [facility] 2NE-A Attending No att. providers found   Hosp Day # 7 PCP Lourdes Buchanan MD     Date of Admission: 2021  Joaquín diuretics and abx. Lace+ Score: 78  59-90 High Risk  29-58 Medium Risk  0-28   Low Risk         TCM Follow-Up Recommendation:  LACE > 58:  High Risk of readmission after discharge from the hospital.    Procedures during hospitalization:   • none    Inc medications      Instructions Prescription details   Advair Diskus 250-50 MCG/DOSE Aepb  Generic drug: fluticasone-salmeterol      Inhale 1 puff into the lungs 2 (two) times daily.    Quantity: 1 each  Refills: 1     Albuterol Sulfate  (90 Base) MCG/ Tabs  Commonly known as: ULTRAM              Where to Get Your Medications      These medications were sent to [address], [address]  [facility], [phone], [address], IT edema.  -----------------------------------------------------------------------------------------------  PATIENT DISCHARGE INSTRUCTIONS: See electronic chart    Capo Jay MD    Time spent:  > 30 minutes

## 2021-10-14 NOTE — TELEPHONE ENCOUNTER
Rehab progress notes from [place] [address], signed by dr. Edi James and faxed back to [phone]. Fax confirmation received.  Sent to scan

## 2021-10-28 NOTE — TELEPHONE ENCOUNTER
Order #[id number] and #[id number] signed faxed to [facility] at [phone]. confirmation received.  Sent to scan

## 2021-11-11 ENCOUNTER — TELEPHONE (OUTPATIENT)
Dept: INTERNAL MEDICINE CLINIC | Facility: CLINIC | Age: 86
End: 2021-11-11

## 2021-11-11 NOTE — TELEPHONE ENCOUNTER
cetfernanda of [facility] state they faxed orders to be signed and have not receiced anything back. [facility] will be faxing outstanding orders again, please sign and fax back to [phone].

## 2021-11-12 ENCOUNTER — MED REC SCAN ONLY (OUTPATIENT)
Dept: INTERNAL MEDICINE CLINIC | Facility: CLINIC | Age: 86
End: 2021-11-12

## 2022-03-10 NOTE — TELEPHONE ENCOUNTER
Patient's son Emerson Johnson, calling with patient on speaker. Patient is moving into assisted living, that requires two step TB testing. TB test was placed 7/9/21 and read today.  According to [facility] Tuberculosis/testing and diagnosis TB screening and testing of hea no chest pain and no edema.

## 2023-03-20 NOTE — TELEPHONE ENCOUNTER
Noted, agreed, will sign, thanks.
[facility] from [address] [place] [address] is notifying PCP of patient now at facility living ; moved in Saturday, September 25, 2021. Confirmation needed from PCP with okay to sign orders. Information on last visit and future visits needed. Please call to confirm.
Spoke with Francois Aguero, relayed dr Ndiaye Needs message  [facility] office fax number [phone] to send orders for signature
no

## 2023-08-25 NOTE — TELEPHONE ENCOUNTER
LMTCB--ok to transfer to RN Post-Op Phone Call  [facility]    Surgeon: Moise Kim M.D.  Date of Surgery: 8/22/2023  Discharge Date: 8/23/2023    Date/Time Called:   Date: 8/25/2023 Time: 2:57 PM   Attempt: First    Patient unavailable, message left to call [facility] with questions/problems? No    Pain Control:  Intensity: Mild (1 - 3)  Duration/Location/Explain: Incisional pain, general abd area.   What makes it better/worse? Ice packs, movement makes it worse.     Medications:  Narcotic Use - No  Drug type: Gabapentin  Frequency: every 8 hours    Non-prescription pain control: Tylenol every 6 hours.     Other medications currently taking: see med list.     Complete Multivitamin + Iron BID? Yes    Vitamin B12? sublingual daily    Incisions:  Drainage? clean and dry  Comment: Steri strips on.     Intake/Output:  Fluid Intake(oz/day)? 30-40 oz  Fluid type? Water and Pedialyte.     Heartburn? No  Counseling: Esomeprazole  Nausea?  A little nausea with yogurt.  Vomiting? No  Explain:     Voiding Frequency? 4 or more/day     Voiding-Color/Amount? Good.    Flatus? Yes    Bowel Movement?No     Are you using your incentive spirometer? If yes, how often? 3+/day    Any fever type symptoms? No  Explain:     Walking activity?   Frequency/Type: up and around the house, went to the mall.    In Preparation for Surgery:  On a scale of 1-5, with 5 being the highest, how well did the pre-op class prepare you for what actually happened in the hospital? 5  If you were unable to give us a 5, what could we have done to earn a 5?     Is there anything that you wish you would have known prior to surgery that you did not know? If yes, what? No.    On a scale of 1-5, with 5 being the highest, how was the service while you were in the hospital? 5  If you were unable to give us a 5, what could we have done to earn a 5?     Is/was there anyone in particular; nurse, aide, hospital staff, that did a great job and you would like us to  recognize? If yes, whom. Overnight nursing assistant was really nice.   What did they do?     Would you recommend [facility] to others? Yes  If no, can you explain why?     Thank you for your time. Please do not hesitate to call us with any questions or concerns.    Call completed by:   Katina Schaffer RN, CBN  [facility]  P [phone]  F [phone]

## 2023-11-08 NOTE — TELEPHONE ENCOUNTER
Daily Note     Today's date: 2023  Patient name: Karla Ott  : 1957  MRN: [id number]  Referring provider: Claudean Piano, DO  Dx:   Encounter Diagnosis     ICD-10-CM    1. Right hand weakness  R29.898                      Subjective: Pt reports she had some pain along ulnar wrist       Objective: See treatment diary below      Assessment: Tolerated treatment well. Patient exhibited good technique with therapeutic exercises and would benefit from continued PT  Post session Pt was able to achieve PIP extension of RF to 5 degrees and SF to 10 degrees independently without blocking MCP joint  Pt still unable to adduct SF fully. Reviewed HEP with Pt. Encouraged her to continue working on pinching exercises and digit adduction    Plan: Continue per plan of care.       Precautions: DOI 23  HEP: PROM digits, TGE's, digit abd/add, thumb opp, thumb rad/palm abd, towel bunches, putty roll/pinch, foam squeezes      Manuals 10/2 10/4 10/10 10/16 10/19 10/23 10/25 10/31 11/3 11/8   MH in flexion 10 10 10 10 10 10 10 10 10 10   PROM digits 10 10 + STM 10 10 10 10 10 10 10 10   Ulnar nerve glides 5 + assessment             30 20 20  20 20 20 20 20 20   Neuro Re-Ed             Wrist maze             Chess pieces 3 min 3 min 3 min 3 min  3 min 3 min 3 min 3 min     clothespins 4 min yellow 4 min yellow 4 min red and yellow 4 min red 4 min red 4 min red 4 min red 4 min red 4 min red 4 min red   Grooved pegs 1 board 1 board  1 board 1 board 1 board 1 board 1 board  1 board 1 board 1 board   Nuts/bolts 3 min                          15 10 10  10 10 10 10  10   Ther Ex                          pegs grasping 3 min 3 min 3 min np         Ball roll for wrist 3 min 3 min 3 min 3 min NP        Wrist AROM 2# 2x10 2# 2x10 2# 2x10 2# 2x10 2# 2x10 2# 2x10 3# 2x10 3# 2x10 3# 3x10 3# 3x10   fingerweb Red 20x  red 20x Red 20x Red x30 Red 30x Red 30x Green 20x  Green 20x  Green 30x  Green 30x   flexbar sup/pron Yellow 20x Yellow 20x Pt stts she shattered her femur one year ago, she still is not walking, does not have transportation, & can't currently make appt to see MD. She requests refills of Pulmicort (4 days left) & Ventolin (1 day left).  Explained if she is not going to f/u w/  Yellow 20x Ylw x30 Red 20x Red 30x Red 30x  Red 30x Red 30x Red 30x   flexbar towel twist Yellow 20x Yellow 20x  Yellow 20x Ylw x30 Red 20x Red 30x Red 30x Red 30x Red 30x Red 30x   digiweb       Light red 30x Light red 30x  Light red 30x Lgith green 30x    20 20 20  15 15 15 15 20 20   Ther Activity                                       Gait Training                                       Modalities

## 2024-01-24 NOTE — CM/SW NOTE
Patient accepted @ the [facility], assigned to room 132. Nurse to call report to [phone] and also update son delfino.   Shu Lloyd arranged for 1330  (PCS form completed) cost discussed with son yesterday Never

## (undated) DEVICE — Device: Brand: DEFENDO AIR/WATER/SUCTION AND BIOPSY VALVE

## (undated) DEVICE — 3 ML SYRINGE LUER-LOCK TIP: Brand: MONOJECT

## (undated) DEVICE — 6 ML SYRINGE LUER-LOCK TIP: Brand: MONOJECT

## (undated) DEVICE — CONMED SCOPE SAVER BITE BLOCK, 20X27 MM: Brand: SCOPE SAVER

## (undated) DEVICE — MEDI-VAC NON-CONDUCTIVE SUCTION TUBING 6MM X 1.8M (6FT.) L: Brand: CARDINAL HEALTH

## (undated) DEVICE — 35 ML SYRINGE REGULAR TIP: Brand: MONOJECT

## (undated) DEVICE — FORCEP RADIAL JAW 4

## (undated) DEVICE — LINE MNTR ADLT SET O2 INTMD

## (undated) DEVICE — Device: Brand: CUSTOM PROCEDURE KIT

## (undated) NOTE — IP AVS SNAPSHOT
[address]            (For Outpatient Use Only) Initial Admit Date: 8/12/2021   Inpt/Obs Admit Date: Inpt: 8/12/21 / Obs: N/A   Discharge Date:    Aundrea Copier:  [de-identified]   MRN: [de-identified]   CSN: [id number]   CEID: [id number] INSURANCE   Payor:  Plan:    Group Number:  Insurance Type:    Subscriber Name:  Subscriber :    Subscriber ID:  Pt Rel to Subscriber:    Hospital Account Financial Class: Medicare    2021

## (undated) NOTE — LETTER
July 10, 2017     Zac Cruz      Dear Andre Bending:    Below are the results from your recent visit:    Resulted Orders   LIPID PANEL   Result Value Ref Range    HDL Cholesterol 72 mg/dL    Cholesterol, Total 16 good.  Continue on same medication. Plan to repeat same labs 6 months. Order is enclosed. If you have any questions or concerns, please don't hesitate to call. Teena Dos Santos, DO

## (undated) NOTE — LETTER
[facility], [place]  Authorization for Invasive Procedures  1.  I hereby authorize Dr. Allison Chew and Lilo Rader , my physician and whomever may be designated as the doctor's assistant, to perform the following operation and/or procedure: 4. Should the need arise during my operation or immediate post-operative period; I also consent to the administration of blood and/or blood products.  Further, I understand that despite careful testing and screening of blood and blood products, I may still 9. Patients having a sterilization procedure: I understand that if the procedure is successful the results will be permanent and it will therefore be impossible for me to inseminate, conceive or bear children.  I also understand that the procedure is intend

## (undated) NOTE — MR AVS SNAPSHOT
[facility]  [address], [address]  [address] [facility]  [phone]               Thank you for choosing us for your health care visit with Dung Cochran. DO Levon.   We are glad to serve you and happy to provide you with this ga Take 0.5 tablets (5 mg total) by mouth 3 (three) times daily as needed for Muscle spasms.    Commonly known as:  cyclobenzaprine           Ferrous Sulfate 325 (65 FE) MG Tabs   Take 1 tablet (325 mg total) by mouth daily with breakfast.           furosemide Your physician has referred you to a specialist.  Your physician or the clinic staff will provide you with the phone number you should call to schedule your appointment.      If you are confident that your benefit plan will not require a referral or authori

## (undated) NOTE — LETTER
[facility], [place]  Authorization for Invasive Procedures  1.  I hereby authorize Dr. Aura Reyna , my physician and whomever may be designated as the doctor's assistant, to perform the following operation and/or procedure:  Katelyn Jeronimo 4. Should the need arise during my operation or immediate post-operative period; I also consent to the administration of blood and/or blood products.  Further, I understand that despite careful testing and screening of blood and blood products, I may still 9. Patients having a sterilization procedure: I understand that if the procedure is successful the results will be permanent and it will therefore be impossible for me to inseminate, conceive or bear children.  I also understand that the procedure is intend

## (undated) NOTE — IP AVS SNAPSHOT
Patient Demographics     Address  [address] [place] [id number] Phone  [phone] (Home) *Preferred*  [phone] [facility]) E-mail Address  [email or web address]      Emergency Contact(s)     Name Relation Home Work Rip use of these tools will help you develop the skills necessary to keep your heart failure under control.      Heart Failure Guidelines - place this worksheet on your refrigerator or somewhere you can refer to it daily to help you decide if your symptoms are your legs  · You have blurred vision or see yellowish-green halos around objects of lights    Go to the Emergency Room If:   · You have pain or tightness in your chest  · You are extremely short of breath  · You are coughing up pink-frothy mucus  · You are 6 months, and 1 year. · You will need antibiotics prior to any dental work. Please contact your dentist prior to appointment to receive prescription for this.      CONTACT NUMBERS FOR YOUR REFERENCE:  · [facility] (Dr. Ankush Mays) [phone]  · Trevor Lr MD   [    ]   [    ]   [    ]   [    ]     furosemide 40 MG Tabs  Commonly known as: LASIX      Take 1 tablet (40 mg total) by mouth daily.    Suhail Davalos MD   [    ]   [    ]   [    ]   [    ]     Melatonin 5 MG Tabs      Take 2 1 tablet (15 mg total) by mouth daily.    Evonne Zeng MD   [    ]   [    ]   [    ]   [    ]              [id number] - MAR ACTION REPORT  (last 24 hrs)    ** SITE UNKNOWN **     Order ID Medication Name Action Time Action Reason Comments    [id number] Completed Lab Status: Final result Updated: 08/17/21 1008    Specimen: Urine, clean catch      Urine Culture No Growth at 18-24 hrs.          H&P - H&P Note      H&P signed by Emerald Ramos MD at 8/14/2021  5:00 PM  Version 1 of 1    Author: Emerald Ramos MD SURGERY Left 1950   • OTHER Left 08/31/2017    distal femoral replacement   • TONSILLECTOMY  1940S   • TOTAL HIP REPLACEMENT     • TOTAL KNEE REPLACEMENT         Social History:  reports that she quit smoking about 47 years ago.  Her smoking use included ci weight greater than 130lbs. )., Disp: 90 tablet, Rfl: 3  metoprolol tartrate 50 MG Oral Tab, Take 1 tablet (50 mg total) by mouth 2 (two) times daily. , Disp: 180 tablet, Rfl: 3  metolazone 2.5 MG Oral Tab, Take 1 tablet (2.5 mg total) by mouth daily as need Vitamins-Minerals (CENTRUM SILVER) Oral Tab, Take  by mouth. Take one tablet by mouth daily, Disp: , Rfl:   Vitamin D3 (VITAMIN D3) 2000 UNITS Oral Cap, Take  by mouth.  take 1 Capsule by Oral route  every day, Disp: , Rfl:         Review of Systems:   A co Pro-Calcitonin  No results for input(s): PCT in the last 168 hours. Cardiac  Recent Labs   Lab 08/14/21  0918   PBNP 8,498*       Creatinine Kinase  No results for input(s): CK in the last 168 hours.     Inflammatory Markers  No results for input(s): CR History of Present Illness:   Patient is a 81year old female with PMH of anxiety, HTN, HL, PATRICIA, CAD, afib (on xarelto), GERD. S/p mitraclip 8/12/21. Undergoing diuresis and dealing with constipation, poor mobility currently.  Also with JOSE and nephrology f Social History    Tobacco Use      Smoking status: Former Smoker        Packs/day: 1.50        Years: 10.00        Pack years: 15        Types: Cigarettes        Start date: 8/15/1958        Quit date: 1974        Years since quittin.6      Smokel 08/16/2021     Lab Results   Component Value Date     08/16/2021    K 3.9 08/16/2021     08/16/2021    CO2 25.0 08/16/2021    BUN 68 (H) 08/16/2021     (H) 08/16/2021    GFRAA 30 (L) 08/16/2021    AST 17 08/04/2021    ALT 27 08/04/2021 on 8/16/2021  5:02 PM                     D/C Summary    No notes of this type exist for this encounter.      Imaging Results (HF patients)    Chest X-Ray Results (HF patients only)    XR CHEST AP/PA (1 VIEW) (CPT=71045)    Result Date: 8/4/2021  PROCEDURE: 1. 62m^2 Sonographer: Jermaine Rodriguez [facility] Ordering:    Noreen Field Consulting:  Noreen Field Referring:   Logan Carrasco M.D. ---------------------------------------------------------------------------- History/Indications:  S/P Mitraclip x1, POD #1.   Gregory Riddle ---------------------------------------------------------------------------- * Left ventricle: The cavity size was normal. Wall thickness was mildly increased. Systolic function was normal. The estimated ejection fraction was 60-65%.  No regional wall bhavna LV ID, ED, PLAX                         (L)     3.3   cm     3.9 - 5.3  LV ID, ES, PLAX                                 2.3   cm     ---------  LV fx shortening, PLAX                          30    %      27 - 45  LV mid-wall fx shortening, PLAX         (L peak velocity                  3.34  m/sec  ---------  Tricuspid peak RV-RA gradient                   45    mm Hg  ---------  Tricuspid maximal regurg velocity, PISA         3.23  m/sec  ---------   Systemic veins                                  Value Urinary retention      Past Medical History  Past Medical History:   Diagnosis Date   • A-fib [facility])    • Anxiety state, unspecified    • Arrhythmia    • Asthma    • CAD (coronary artery disease)    • COPD (chronic obstructive pulmonary disease) ([facility] [address]    • -   Sitting down on and standing up from a chair with arms (e.g., wheelchair, bedside commode, etc.): A Little   -   Moving from lying on back to sitting on the side of the bed?: A Little   How much help from another person does the patient currently need. Did note elevated respiratory rates w/ ex and pt required 30 seconds to one minute to recover b/w each set.  Patient will cont to benefit from skilled PT to improve strength, balance and cardiopulmonary endurance to insure highest level of function prior to Tb Intradermal Test 07/19/21     Tb Intradermal Test 07/09/21       Future Appointments        Provider Department Center    8/30/2021 10:00  [facility] - [address]    8/30/2021 10:45 AM Lupillo Ocampo MD goals for specific interventions   Patient/Family Short Term Goal     Interdisciplinary Adequate for Discharge    Description: Patient's Short Term Goal: Patient would like to be discharged    Interventions:   - Take meds, ambulate early, monitor VS  - Mon

## (undated) NOTE — MR AVS SNAPSHOT
[facility]  [address] [place] [address], [address]   Janae Show               Thank you for choosing us for your health care visit with Tamra Alvarez. DO Levon.   We are glad to serve you and happy to provide you with this summary Marcy Dutton   [facility]   [place] [address]   Phone:  [phone]   Fax:  [phone] Dung Garcia DO   [address] [facility] [address] [address]   Phone:  [phone]   Fax:  [phone]    Diagnoses:  Drew Minor If you are confident that your benefit plan will not require a referral or authorization, such as [place] Medicaid, please feel free to schedule your appointment immediately.  However, if you are unsure about the requirements for authorization, please wait furosemide 20 MG Tabs   Take 2 tablets (40 mg total) by mouth daily.    Commonly known as:  LASIX           Omeprazole 40 MG Cpdr   TAKE ONE CAPSULE BY MOUTH EVERY NIGHT AT BEDTIME           Potassium Chloride ER 10 MEQ Tbcr   1 po daily   What changed:  S Don’t eat while distracted and slow down. Avoid over sized portions. Don’t eat while when you’re bored.      EAT THESE FOODS MORE OFTEN: EAT THESE FOODS LESS OFTEN:   Make half your plate fruits and vegetables Highly refined, white starches including wh

## (undated) NOTE — Clinical Note
Dr. Rebecca Lundborg - I saw Ms. Jose Canales today w/ her son. There seemed to be some confusion about the office visit. She was under the impression that she was getting an abdominal ultrasound but I did not see any documentation/orders to indicate this.  She noted some i

## (undated) NOTE — IP AVS SNAPSHOT
Patient Demographics     Address  [address] [place] [id number] Phone  [phone] (Home) *Preferred*  [phone] [facility]) E-mail Address  [email or web address]      Emergency Contact(s)     Name Relation Home Work Rip and a section for recording your thoughts or questions. Things for You to Remember:   1. An appointment has been made for you to see your doctor or healthcare provider within 7 days of hospital discharge.  It is important that you attend this appointmen Shandra Hoyos MD.    Specialty: Internal Medicine  Why: As needed  Contact information:  [address] [place] [address]  [phone]                  Your medication list      TAKE these medications       Instructions Authorizing Provider [facility] hours while awake.    Ashley Godinez MD         melatonin 3 MG Tabs      Take one tab nightly prn   Ashley Godinez MD         metoprolol tartrate 25 MG Tabs  Commonly known as: LOPRESSOR  Next dose due: 9/4/2021      Take 1 tablet (25 mg total) by mouth 2x Da Reason Comments    [id number] ALPRAZolam Flordia Overlie) tab 0.25 mg 09/03/21 1612 Given      [id number] ALPRAZolam (XANAX) tab 0.25 mg 09/04/21 0858 Given      [id number] Miconazole Nitrate 2 % powder 09/03/21 2055 Given      [id number] Miconazole Nitrate 2 % powder Lab Results Last 24 Hours      Basic Metabolic Panel (8) [[id number]] (Abnormal)  Resulted: 09/04/21 0713, Result status: Final result   Ordering provider: Alice Lyman MD  09/03/21 2300 Resulting lab: [facility])    Sp SARS-CoV-2 by PCR Not Detected    Blood Culture [[id number]] Collected: 08/27/21 2300    Order Status: Completed Lab Status: Final result Updated: 09/02/21 0000    Specimen: Blood,peripheral      Blood Culture Result No Growth 5 Days    Blood Culture [24876 her coughing spells, felt that she couldn't catch her breath. She was taking lasix 80mg BID and was weaned down to 40mg BID about 1 month ago for volume overload. She denies any fevers/chills[AK.1], abdominal pain, n/v/d.  She has LE edema which she feels i Father         PREMATURE CAD   • Alcohol and Other Disorders Associated Father         ALCOHOLISM        Allergies:   Amoxicillin                 Comment:Other reaction(s): Unknown             Other reaction(s): Rash  Cephalosporins              Comment: Ot daily., Disp: 90 tablet, Rfl: 3  Pravastatin Sodium 20 MG Oral Tab, TAKE 1 TABLET(20 MG) BY MOUTH EVERY NIGHT, Disp: 90 tablet, Rfl: 3  Potassium Chloride ER (KLOR-CON M10) 10 MEQ Oral Tab CR, Take 1 tablet (10 mEq total) by mouth daily. , Disp: 90 tablet, focal neurological deficits. CNII-XII grossly intact. Musculoskeletal: Moves all extremities. Extremities:[AK.1] significant BLLE erythema and edema, healing hematoma to R anterior shin[AK.2]  Integument: No rashes or lesions.    Psychiatric: Appropriate asymptomatic bacteruria - denies any urinary s/s  1. Follow urine culture  2. levaquin as above  4. 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 Monitor closely with diuresis  5. COPD - not in exacerbation  1. PTA inhalers, duonebs  2.  Wean O2 as tolerated to her depressive symptoms. She doesn't think God is listening to her prayers anymore. Primary Psychiatric Diagnosis: Anxiety and depressive disorder unspecified. Component of anxiety due to general medical condition (ie COPD, CHF).      Rule Out D loss of control due to her health issues the past year. She talked about waiting 45 min at a [facility] to be assisted off the toilet and into the bed.  She complained of her dinner coming 90 min later this evening and not being able to eat it because she has to e pressure    • High cholesterol    • Hypercholesterolemia    • Osteoarthritis    • Other and unspecified hyperlipidemia     HIGH CHOLESTEROL   • Shortness of breath    • Unspecified essential hypertension    • Unspecified sleep apnea      Past Surgical Hist 0.25 MG/2ML nebulizer solution 0.5 mg, 0.5 mg, Nebulization, 2 times daily  •  ipratropium-albuterol (DUONEB) nebulizer solution 3 mL, 3 mL, Nebulization, Q6H WA  •  sodium chloride 3 % nebulizer solution 3 mL, 3 mL, Nebulization, 2 times daily  •  levoFLO Orientation:[RH.1] oriented person, place and oriented situation[RH.3]  Attention and Concentration:[RH.1] fair[RH.3]  Memory:[RH.1]  intact immediate, recent, remote[RH.3]  Language: Intact naming and repetition  Fund of Knowledge: Able to recite name of (HF patients)    Chest X-Ray Results (HF patients only)    No exam resulted this encounter.       2D Echocardiogram Results (HF patients only)    CARD ECHO 2D DOPPLER (CPT=93306)    Result Date: 8/28/2021 Systolic function was normal. The estimated ejection fraction was 65-70%. No regional wall motion abnormalities. The study is not technically    sufficient to allow evaluation of LV diastolic function. 2. Mitral valve:  An [id number] MitraClip was pr separation was normal.  Doppler:  Transvalvular velocity was within the normal range. There was no stenosis. No significant regurgitation. Tricuspid valve:   Structurally normal valve.    Leaflet separation was normal.  Doppler:  Transvalvular velocity was (H)     3.5   cm/m^2   1. 5 - 2.3  LA volume, S                            (H)     204   ml       22 - 52  LA volume/bsa, S                        (H)     133   ml/m^2   16 - 28  LA volume, ES, 1-p A4C                  (H)     279   ml reference range. ---------------------------------------------------------------------------- Prepared and electronically signed by Navya Garcia MD 08/28/2021 09:50     CARD ECHO 2D DOPPLER (CPT=93306)    Result Date: 8/13/2021 motion abnormalities. The study is not    technically sufficient to allow evaluation of LV diastolic function. 2. Mitral valve: There was moderate regurgitation. Mean gradient (D): 4-6mm    Hg.  A Mitral clip is present and well seated encompassing A2-P2. 3 valve:   Structurally normal valve. Leaflet separation was normal.  Doppler:  Transvalvular velocity was within the normal range. There was no evidence for stenosis. There was moderate-severe regurgitation. Pulmonic valve:    Structurally normal valve. 22 - 52  LA volume/bsa, S                        (H)     78    ml/m^2 16 - 28  LA volume, ES, 1-p A4C                  (H)     140   ml     22 - 52  LA volume/bsa, ES, 1-p A4C                      86    ml/m^2 ---------  LA volume, ES, 1-p A2C Physical Therapist    Filed: 9/3/2021  3:44 PM Date of Service: 9/3/2021  3:41 PM Status: Signed    : Clif Gates PT (Physical Therapist)        PHYSICAL THERAPY TREATMENT NOTE - INPATIENT    Room Number: 6202/4291-B     Session: 2   Number of V REPLACEMENT     • TOTAL KNEE REPLACEMENT         SUBJECTIVE  \"I really want to do this. \"    Patient’s self-stated goal is to get stronger.     OBJECTIVE  Precautions: Bed/chair alarm    WEIGHT BEARING RESTRICTION  Weight Bearing Restriction: None stance, audible popping in R knee causing pt increased anxiety. Pt requires increased time to express fears and allow for recovery through each bout of activity due to SOB. Pt with O2 sats at 94% and above throughout session.       Patient End of Session: 1[AE.1]   Number of Visits to Meet Established Goals: 3[AE.2]     History related to current admission: Pt was admitted from home on 8/27/2021 with SOB. Pt has a past medical history significant for COPD.  See below for detailed past medical/surgical histor RESTRICTION[AE.1]  Weight Bearing Restriction: None[AE.2]                PAIN ASSESSMENT[AE.1]   Rating: Unable to rate  Location: R knee  Management Techniques: Activity promotion; Body mechanics; Relaxation;Repositioning[AE.2]    BALANCE[AE.1] to perform further exercises with increasing agitation. Pt repeatedly stating \"I just want to walk. That is my goal!\" however not willing to take steps to complete. Pt appears mostly limited by anxiety at this time.        THERAPEUTIC EXERCISES  Lower PT on 9/1/2021  3:48 PM                     Occupational Therapy Notes (last 72 hours) (Notes from 9/1/2021  2:02 PM through 9/4/2021  2:02 PM)    No notes of this type exist for this encounter.      Video Swallow Study Notes    No notes of this type exist Electrolyte replacement  - See additional Care Plan goals for specific interventions

## (undated) NOTE — IP AVS SNAPSHOT
[address]            (For Outpatient Use Only) Initial Admit Date: 8/27/2021   Inpt/Obs Admit Date: Inpt: 8/28/21 / Obs: N/A   Discharge Date:    Lee Early:  [de-identified]   MRN: [de-identified]   CSN: [id number]   CEID: [id number] Subscriber: SELF   TERTIARY INSURANCE   Payor:  Plan:    Group Number:  Insurance Type:    Subscriber Name:  Subscriber :    Subscriber ID:  Pt Rel to Subscriber:    Hospital Account Financial Class: Medicare    2021

## (undated) NOTE — MR AVS SNAPSHOT
[facility] SPECIAL[facility] [place] [address] [place]  [phone] [phone]               Thank you for choosing us for your health care visit with Ross Enriquez MD.  We are glad to serve you and happy to provide you with this summary o furosemide 20 MG Tabs   Take 2 tablets (40 mg total) by mouth daily. Commonly known as:  LASIX           hydrocodone-acetaminophen 7.5-325 MG Tabs   Take 1 tablet by mouth every 6 (six) hours as needed for Pain.    Commonly known as:  [facility]:  CARD ECHO 2D DOPPLER (CPT=93306)    Instructions:  [facility] refers patients to have laboratory, x-ray, and various other diagnostic tests done at [facility], however your insurance company may require you